# Patient Record
Sex: FEMALE | Race: WHITE | Employment: UNEMPLOYED | URBAN - METROPOLITAN AREA
[De-identification: names, ages, dates, MRNs, and addresses within clinical notes are randomized per-mention and may not be internally consistent; named-entity substitution may affect disease eponyms.]

---

## 2017-01-10 ENCOUNTER — HOSPITAL ENCOUNTER (EMERGENCY)
Age: 32
Discharge: HOME | End: 2017-01-10
Attending: EMERGENCY MEDICINE
Payer: MEDICAID

## 2017-01-10 VITALS
HEART RATE: 85 BPM | SYSTOLIC BLOOD PRESSURE: 139 MMHG | BODY MASS INDEX: 43.32 KG/M2 | RESPIRATION RATE: 16 BRPM | TEMPERATURE: 98.1 F | OXYGEN SATURATION: 95 % | WEIGHT: 260 LBS | HEIGHT: 65 IN | DIASTOLIC BLOOD PRESSURE: 80 MMHG

## 2017-01-10 DIAGNOSIS — G43.809 OTHER MIGRAINE WITHOUT STATUS MIGRAINOSUS, NOT INTRACTABLE: Primary | ICD-10-CM

## 2017-01-10 LAB — HCG UR QL: NEGATIVE

## 2017-01-10 PROCEDURE — 81025 URINE PREGNANCY TEST: CPT

## 2017-01-10 PROCEDURE — 96361 HYDRATE IV INFUSION ADD-ON: CPT

## 2017-01-10 PROCEDURE — 74011000258 HC RX REV CODE- 258: Performed by: PHYSICIAN ASSISTANT

## 2017-01-10 PROCEDURE — 99283 EMERGENCY DEPT VISIT LOW MDM: CPT

## 2017-01-10 PROCEDURE — 74011250636 HC RX REV CODE- 250/636: Performed by: PHYSICIAN ASSISTANT

## 2017-01-10 PROCEDURE — 96375 TX/PRO/DX INJ NEW DRUG ADDON: CPT

## 2017-01-10 PROCEDURE — 96374 THER/PROPH/DIAG INJ IV PUSH: CPT

## 2017-01-10 RX ORDER — LORAZEPAM 2 MG/ML
1 INJECTION INTRAMUSCULAR
Status: COMPLETED | OUTPATIENT
Start: 2017-01-10 | End: 2017-01-10

## 2017-01-10 RX ORDER — DEXAMETHASONE SODIUM PHOSPHATE 10 MG/ML
10 INJECTION INTRAMUSCULAR; INTRAVENOUS
Status: COMPLETED | OUTPATIENT
Start: 2017-01-10 | End: 2017-01-10

## 2017-01-10 RX ORDER — DIPHENHYDRAMINE HYDROCHLORIDE 50 MG/ML
50 INJECTION, SOLUTION INTRAMUSCULAR; INTRAVENOUS
Status: COMPLETED | OUTPATIENT
Start: 2017-01-10 | End: 2017-01-10

## 2017-01-10 RX ORDER — KETOROLAC TROMETHAMINE 30 MG/ML
30 INJECTION, SOLUTION INTRAMUSCULAR; INTRAVENOUS
Status: COMPLETED | OUTPATIENT
Start: 2017-01-10 | End: 2017-01-10

## 2017-01-10 RX ADMIN — LORAZEPAM 1 MG: 2 INJECTION INTRAMUSCULAR; INTRAVENOUS at 19:43

## 2017-01-10 RX ADMIN — SODIUM CHLORIDE 1000 ML: 900 INJECTION, SOLUTION INTRAVENOUS at 19:41

## 2017-01-10 RX ADMIN — PROMETHAZINE HYDROCHLORIDE 25 MG: 25 INJECTION INTRAMUSCULAR; INTRAVENOUS at 20:14

## 2017-01-10 RX ADMIN — KETOROLAC TROMETHAMINE 30 MG: 30 INJECTION, SOLUTION INTRAMUSCULAR at 19:43

## 2017-01-10 RX ADMIN — DIPHENHYDRAMINE HYDROCHLORIDE 50 MG: 50 INJECTION, SOLUTION INTRAMUSCULAR; INTRAVENOUS at 19:43

## 2017-01-10 RX ADMIN — DEXAMETHASONE SODIUM PHOSPHATE 10 MG: 10 INJECTION, SOLUTION INTRAMUSCULAR; INTRAVENOUS at 19:43

## 2017-01-10 NOTE — ED TRIAGE NOTES
Pt reports headache and vomiting starting this morning. Reports hx of migraines. Denies hitting her head. Reports taking propanolol and tylenol with no relief of headache.

## 2017-01-11 NOTE — DISCHARGE INSTRUCTIONS
Migraine Headache: Care Instructions  Your Care Instructions  Migraines are painful, throbbing headaches that often start on one side of the head. They may cause nausea and vomiting and make you sensitive to light, sound, or smell. Without treatment, migraines can last from 4 hours to a few days. Medicines can help prevent migraines or stop them after they have started. Your doctor can help you find which ones work best for you. Follow-up care is a key part of your treatment and safety. Be sure to make and go to all appointments, and call your doctor if you are having problems. It's also a good idea to know your test results and keep a list of the medicines you take. How can you care for yourself at home? · Do not drive if you have taken a prescription pain medicine. · Rest in a quiet, dark room until your headache is gone. Close your eyes, and try to relax or go to sleep. Don't watch TV or read. · Put a cold, moist cloth or cold pack on the painful area for 10 to 20 minutes at a time. Put a thin cloth between the cold pack and your skin. · Use a warm, moist towel or a heating pad set on low to relax tight shoulder and neck muscles. · Have someone gently massage your neck and shoulders. · Take your medicines exactly as prescribed. Call your doctor if you think you are having a problem with your medicine. You will get more details on the specific medicines your doctor prescribes. · Be careful not to take pain medicine more often than the instructions allow. You could get worse or more frequent headaches when the medicine wears off. To prevent migraines  · Keep a headache diary so you can figure out what triggers your headaches. Avoiding triggers may help you prevent headaches. Record when each headache began, how long it lasted, and what the pain was like.  (Was it throbbing, aching, stabbing, or dull?) Write down any other symptoms you had with the headache, such as nausea, flashing lights or dark spots, or sensitivity to bright light or loud noise. Note if the headache occurred near your period. List anything that might have triggered the headache. Triggers may include certain foods (chocolate, cheese, wine) or odors, smoke, bright light, stress, or lack of sleep. · If your doctor has prescribed medicine for your migraines, take it as directed. You may have medicine that you take only when you get a migraine and medicine that you take all the time to help prevent migraines. ¨ If your doctor has prescribed medicine for when you get a headache, take it at the first sign of a migraine, unless your doctor has given you other instructions. ¨ If your doctor has prescribed medicine to prevent migraines, take it exactly as prescribed. Call your doctor if you think you are having a problem with your medicine. · Find healthy ways to deal with stress. Migraines are most common during or right after stressful times. Take time to relax before and after you do something that has caused a migraine in the past.  · Try to keep your muscles relaxed by keeping good posture. Check your jaw, face, neck, and shoulder muscles for tension. Try to relax them. When you sit at a desk, change positions often. And make sure to stretch for 30 seconds each hour. · Get plenty of sleep and exercise. · Eat meals on a regular schedule. Avoid foods and drinks that often trigger migraines. These include chocolate, alcohol (especially red wine and port), aspartame, monosodium glutamate (MSG), and some additives found in foods (such as hot dogs, nichols, cold cuts, aged cheeses, and pickled foods). · Limit caffeine. Don't drink too much coffee, tea, or soda. But don't quit caffeine suddenly. That can also give you migraines. · Do not smoke or allow others to smoke around you. If you need help quitting, talk to your doctor about stop-smoking programs and medicines. These can increase your chances of quitting for good.   · If you are taking birth control pills or hormone therapy, talk to your doctor about whether they are triggering your migraines. When should you call for help? Call 911 anytime you think you may need emergency care. For example, call if:  · You have signs of a stroke. These may include:  ¨ Sudden numbness, paralysis, or weakness in your face, arm, or leg, especially on only one side of your body. ¨ Sudden vision changes. ¨ Sudden trouble speaking. ¨ Sudden confusion or trouble understanding simple statements. ¨ Sudden problems with walking or balance. ¨ A sudden, severe headache that is different from past headaches. Call your doctor now or seek immediate medical care if:  · You have new or worse nausea and vomiting. · You have a new or higher fever. · Your headache gets much worse. Watch closely for changes in your health, and be sure to contact your doctor if:  · You are not getting better after 2 days (48 hours). Where can you learn more? Go to http://jose-jacek.info/. Enter V938 in the search box to learn more about \"Migraine Headache: Care Instructions. \"  Current as of: February 19, 2016  Content Version: 11.1  © 1617-5690 [employer]. Care instructions adapted under license by [employer] (which disclaims liability or warranty for this information). If you have questions about a medical condition or this instruction, always ask your healthcare professional. [address] any warranty or liability for your use of this information. We hope that we have addressed all of your medical concerns. The examination and treatment you received in the Emergency Department were for an emergent problem and were not intended as complete care. It is important that you follow up with your healthcare provider(s) for ongoing care.  If your symptoms worsen or do not improve as expected, and you are unable to reach your usual health care provider(s), you should return to the Emergency Department. Today's healthcare is undergoing tremendous change, and patient satisfaction surveys are one of the many tools to assess the quality of medical care. You may receive a survey from the [facility] regarding your experience in the Emergency Department. I hope that your experience has been completely positive, particularly the medical care that I provided. As such, please participate in the survey; anything less than excellent does not meet my expectations or intentions. [facility] and [facility] participate in nationally recognized quality of care measures. If your blood pressure is greater than 120/80, as reported below, we urge that you seek medical care to address the potential of high blood pressure, commonly known as hypertension. Hypertension can be hereditary or can be caused by certain medical conditions, pain, stress, or \"white coat syndrome. \"       Please make an appointment with your health care provider(s) for follow up of your Emergency Department visit. VITALS:   Patient Vitals for the past 8 hrs:   Temp Pulse Resp BP SpO2   01/10/17 2123 - 85 16 139/80 -   01/10/17 1845 98.1 °F (36.7 °C) 80 16 (!) 155/99 95 %          Thank you for allowing us to provide you with medical care today. We realize that you have many choices for your emergency care needs. Please choose us in the future for any continued health care needs. Charisma Golden, [address] [place] [address].   Office: [phone]            Recent Results (from the past 24 hour(s))   HCG URINE, QL. - POC    Collection Time: 01/10/17  7:25 PM   Result Value Ref Range    Pregnancy test,urine (POC) NEGATIVE  NEG         No results found.

## 2017-01-11 NOTE — ED NOTES
TAVIA Patton has reviewed discharge instructions with patient and spouse including prescription(s) if applicable. Patient has received and verbalizes understanding of all instructions and has no further questions at this time. Patient exits ED via wheelchair accompanied by spouse and RN. Patient in no acute distress.

## 2017-01-11 NOTE — ED NOTES
Upon RN entering patient room to start IV and give medications, patient is on the phone and remains on the phone during IV start and initiation of medications.

## 2017-01-11 NOTE — ED PROVIDER NOTES
HPI Comments: Catalina present with complaints of headache and vomiting that started this morning. Patient reports history of migraines. Patient has been taking propanolol and tylenol with no relief. Patient denies any fever, chills or recent head injury. Patient reports this headache is similar to previous migraines. Patient is a 33 y.o. female presenting with headaches. The history is provided by the patient. Headache    This is a new problem. The current episode started 12 to 24 hours ago. The problem occurs constantly. The problem has not changed since onset. The headache is aggravated by activity. The pain is located in the frontal region. The quality of the pain is described as sharp. The pain is severe. Associated symptoms include nausea. She has tried NSAIDs and acetaminophen for the symptoms. The treatment provided no relief. Past Medical History:   Diagnosis Date    Bilateral ovarian cysts     GERD (gastroesophageal reflux disease)     Lupus (HCC)     Migraine     Neurological disorder      migraines     Other ill-defined conditions(799.89)      lupus       Past Surgical History:   Procedure Laterality Date    Hx other surgical       hemmorhoid, hernia-groin    Hx gi       LAPAROSCOPY    Hx hernia repair       BILATERAL INGUINAL HERNIA REPAIR    Hx heent  4/4/16     sinus surgery         Family History:   Problem Relation Age of Onset    Anesth Problems Neg Hx        Social History     Social History    Marital status: SINGLE     Spouse name: N/A    Number of children: N/A    Years of education: N/A     Occupational History    Not on file. Social History Main Topics    Smoking status: Former Smoker     Years: 1.00     Quit date: 3/31/2014    Smokeless tobacco: Not on file    Alcohol use No    Drug use: No    Sexual activity: Not on file     Other Topics Concern    Not on file     Social History Narrative         ALLERGIES: Reglan [metoclopramide];  Percocet [oxycodone-acetaminophen]; Phenergan [promethazine]; Zofran [ondansetron hcl (pf)]; and Compazine [prochlorperazine edisylate]    Review of Systems   Constitutional: Negative. Eyes: Negative. Respiratory: Negative. Cardiovascular: Negative. Gastrointestinal: Positive for nausea. Endocrine: Negative. Genitourinary: Negative. Musculoskeletal: Negative. Allergic/Immunologic: Negative. Neurological: Positive for headaches. Hematological: Negative. Psychiatric/Behavioral: Negative. All other systems reviewed and are negative. Vitals:    01/10/17 1845   BP: (!) 155/99   Pulse: 80   Resp: 16   Temp: 98.1 °F (36.7 °C)   SpO2: 95%   Weight: 117.9 kg (260 lb)   Height: 5' 5\" (1.651 m)            Physical Exam   Constitutional: She is oriented to person, place, and time. She appears well-developed and well-nourished. HENT:   Head: Normocephalic and atraumatic. Right Ear: External ear normal.   Left Ear: External ear normal.   Mouth/Throat: Oropharynx is clear and moist. No oropharyngeal exudate. Eyes: Conjunctivae and EOM are normal. Pupils are equal, round, and reactive to light. Right eye exhibits no discharge. Left eye exhibits no discharge. No scleral icterus. Right eye exhibits normal extraocular motion and no nystagmus. Left eye exhibits normal extraocular motion and no nystagmus. Right pupil is round and reactive. Left pupil is round and reactive. Pupils are equal.   Neck: Normal range of motion. No tracheal deviation present. No thyromegaly present. Cardiovascular: Normal rate, regular rhythm and normal heart sounds. No murmur heard. Pulmonary/Chest: Effort normal and breath sounds normal. No respiratory distress. She has no wheezes. She has no rales. She exhibits no tenderness. Abdominal: Soft. Bowel sounds are normal. She exhibits no distension. There is no tenderness. There is no rebound and no guarding. Musculoskeletal: Normal range of motion.  She exhibits no edema or tenderness. Lymphadenopathy:     She has no cervical adenopathy. Neurological: She is alert and oriented to person, place, and time. She has normal strength. No cranial nerve deficit or sensory deficit. Coordination normal. GCS eye subscore is 4. GCS verbal subscore is 5. GCS motor subscore is 6. Skin: Skin is warm. No erythema. Psychiatric: She has a normal mood and affect. Her behavior is normal. Judgment and thought content normal.   Nursing note and vitals reviewed. MDM  Number of Diagnoses or Management Options  Other migraine without status migrainosus, not intractable:   Diagnosis management comments: Assesment/Plan- Patient with history of migraines. Symptoms improved after medications in ED. Patient encouraged to follow up with PCP and patient is educated on reasons to return to the ED.          Amount and/or Complexity of Data Reviewed  Discuss the patient with other providers: yes (Attending- Dr. Bartolo Cornell)      ED Course       Procedures

## 2017-02-16 ENCOUNTER — HOSPITAL ENCOUNTER (EMERGENCY)
Age: 32
Discharge: HOME | End: 2017-02-16
Attending: EMERGENCY MEDICINE | Admitting: EMERGENCY MEDICINE
Payer: MEDICAID

## 2017-02-16 VITALS
HEIGHT: 65 IN | WEIGHT: 260 LBS | RESPIRATION RATE: 18 BRPM | TEMPERATURE: 98.5 F | HEART RATE: 94 BPM | OXYGEN SATURATION: 97 % | DIASTOLIC BLOOD PRESSURE: 86 MMHG | SYSTOLIC BLOOD PRESSURE: 141 MMHG | BODY MASS INDEX: 43.32 KG/M2

## 2017-02-16 DIAGNOSIS — R51.9 HEADACHE, UNSPECIFIED HEADACHE TYPE: Primary | ICD-10-CM

## 2017-02-16 DIAGNOSIS — Z86.69 HISTORY OF MIGRAINE: ICD-10-CM

## 2017-02-16 LAB
APPEARANCE UR: ABNORMAL
BACTERIA URNS QL MICRO: NEGATIVE /HPF
BILIRUB UR QL: NEGATIVE
COLOR UR: ABNORMAL
EPITH CASTS URNS QL MICRO: ABNORMAL /LPF
GLUCOSE UR STRIP.AUTO-MCNC: NEGATIVE MG/DL
HCG UR QL: NEGATIVE
HGB UR QL STRIP: ABNORMAL
HYALINE CASTS URNS QL MICRO: ABNORMAL /LPF (ref 0–5)
KETONES UR QL STRIP.AUTO: NEGATIVE MG/DL
LEUKOCYTE ESTERASE UR QL STRIP.AUTO: ABNORMAL
NITRITE UR QL STRIP.AUTO: NEGATIVE
PH UR STRIP: 6 [PH] (ref 5–8)
PROT UR STRIP-MCNC: NEGATIVE MG/DL
RBC #/AREA URNS HPF: ABNORMAL /HPF (ref 0–5)
SP GR UR REFRACTOMETRY: 1.02 (ref 1–1.03)
UROBILINOGEN UR QL STRIP.AUTO: 0.2 EU/DL (ref 0.2–1)
WBC URNS QL MICRO: ABNORMAL /HPF (ref 0–4)

## 2017-02-16 PROCEDURE — 81001 URINALYSIS AUTO W/SCOPE: CPT | Performed by: PHYSICIAN ASSISTANT

## 2017-02-16 PROCEDURE — 96365 THER/PROPH/DIAG IV INF INIT: CPT

## 2017-02-16 PROCEDURE — 99284 EMERGENCY DEPT VISIT MOD MDM: CPT

## 2017-02-16 PROCEDURE — 96375 TX/PRO/DX INJ NEW DRUG ADDON: CPT

## 2017-02-16 PROCEDURE — 74011250636 HC RX REV CODE- 250/636: Performed by: PHYSICIAN ASSISTANT

## 2017-02-16 PROCEDURE — 81025 URINE PREGNANCY TEST: CPT

## 2017-02-16 PROCEDURE — 96376 TX/PRO/DX INJ SAME DRUG ADON: CPT

## 2017-02-16 PROCEDURE — 74011000258 HC RX REV CODE- 258: Performed by: PHYSICIAN ASSISTANT

## 2017-02-16 RX ORDER — KETOROLAC TROMETHAMINE 30 MG/ML
15 INJECTION, SOLUTION INTRAMUSCULAR; INTRAVENOUS
Status: COMPLETED | OUTPATIENT
Start: 2017-02-16 | End: 2017-02-16

## 2017-02-16 RX ORDER — DIPHENHYDRAMINE HYDROCHLORIDE 50 MG/ML
25 INJECTION, SOLUTION INTRAMUSCULAR; INTRAVENOUS
Status: COMPLETED | OUTPATIENT
Start: 2017-02-16 | End: 2017-02-16

## 2017-02-16 RX ORDER — PREDNISONE 10 MG/1
TABLET ORAL
Qty: 21 TAB | Refills: 0 | Status: SHIPPED | OUTPATIENT
Start: 2017-02-16 | End: 2017-03-13

## 2017-02-16 RX ORDER — PROMETHAZINE HYDROCHLORIDE 25 MG/1
25 SUPPOSITORY RECTAL
Qty: 12 SUPPOSITORY | Refills: 0 | Status: ON HOLD | OUTPATIENT
Start: 2017-02-16 | End: 2017-03-14

## 2017-02-16 RX ORDER — CETIRIZINE HCL 10 MG
10 TABLET ORAL DAILY
Qty: 20 TAB | Refills: 0 | Status: SHIPPED | OUTPATIENT
Start: 2017-02-16

## 2017-02-16 RX ORDER — MAGNESIUM SULFATE HEPTAHYDRATE 40 MG/ML
2 INJECTION, SOLUTION INTRAVENOUS
Status: COMPLETED | OUTPATIENT
Start: 2017-02-16 | End: 2017-02-16

## 2017-02-16 RX ADMIN — DIPHENHYDRAMINE HYDROCHLORIDE 25 MG: 50 INJECTION, SOLUTION INTRAMUSCULAR; INTRAVENOUS at 11:50

## 2017-02-16 RX ADMIN — KETOROLAC TROMETHAMINE 15 MG: 30 INJECTION, SOLUTION INTRAMUSCULAR at 11:50

## 2017-02-16 RX ADMIN — SODIUM CHLORIDE 1000 ML: 900 INJECTION, SOLUTION INTRAVENOUS at 11:52

## 2017-02-16 RX ADMIN — PROMETHAZINE HYDROCHLORIDE 25 MG: 25 INJECTION INTRAMUSCULAR; INTRAVENOUS at 11:52

## 2017-02-16 RX ADMIN — DIPHENHYDRAMINE HYDROCHLORIDE 25 MG: 50 INJECTION, SOLUTION INTRAMUSCULAR; INTRAVENOUS at 13:01

## 2017-02-16 RX ADMIN — MAGNESIUM SULFATE HEPTAHYDRATE 2 G: 40 INJECTION, SOLUTION INTRAVENOUS at 12:16

## 2017-02-16 NOTE — DISCHARGE INSTRUCTIONS
Headache: Care Instructions  Your Care Instructions    Headaches have many possible causes. Most headaches aren't a sign of a more serious problem, and they will get better on their own. Home treatment may help you feel better faster. The doctor has checked you carefully, but problems can develop later. If you notice any problems or new symptoms, get medical treatment right away. Follow-up care is a key part of your treatment and safety. Be sure to make and go to all appointments, and call your doctor if you are having problems. It's also a good idea to know your test results and keep a list of the medicines you take. How can you care for yourself at home? · Do not drive if you have taken a prescription pain medicine. · Rest in a quiet, dark room until your headache is gone. Close your eyes and try to relax or go to sleep. Don't watch TV or read. · Put a cold, moist cloth or cold pack on the painful area for 10 to 20 minutes at a time. Put a thin cloth between the cold pack and your skin. · Use a warm, moist towel or a heating pad set on low to relax tight shoulder and neck muscles. · Have someone gently massage your neck and shoulders. · Take pain medicines exactly as directed. ¨ If the doctor gave you a prescription medicine for pain, take it as prescribed. ¨ If you are not taking a prescription pain medicine, ask your doctor if you can take an over-the-counter medicine. · Be careful not to take pain medicine more often than the instructions allow, because you may get worse or more frequent headaches when the medicine wears off. · Do not ignore new symptoms that occur with a headache, such as a fever, weakness or numbness, vision changes, or confusion. These may be signs of a more serious problem. To prevent headaches  · Keep a headache diary so you can figure out what triggers your headaches. Avoiding triggers may help you prevent headaches.  Record when each headache began, how long it lasted, and what the pain was like (throbbing, aching, stabbing, or dull). Write down any other symptoms you had with the headache, such as nausea, flashing lights or dark spots, or sensitivity to bright light or loud noise. Note if the headache occurred near your period. List anything that might have triggered the headache, such as certain foods (chocolate, cheese, wine) or odors, smoke, bright light, stress, or lack of sleep. · Find healthy ways to deal with stress. Headaches are most common during or right after stressful times. Take time to relax before and after you do something that has caused a headache in the past.  · Try to keep your muscles relaxed by keeping good posture. Check your jaw, face, neck, and shoulder muscles for tension, and try relaxing them. When sitting at a desk, change positions often, and stretch for 30 seconds each hour. · Get plenty of sleep and exercise. · Eat regularly and well. Long periods without food can trigger a headache. · Treat yourself to a massage. Some people find that regular massages are very helpful in relieving tension. · Limit caffeine by not drinking too much coffee, tea, or soda. But don't quit caffeine suddenly, because that can also give you headaches. · Reduce eyestrain from computers by blinking frequently and looking away from the computer screen every so often. Make sure you have proper eyewear and that your monitor is set up properly, about an arm's length away. · Seek help if you have depression or anxiety. Your headaches may be linked to these conditions. Treatment can both prevent headaches and help with symptoms of anxiety or depression. When should you call for help? Call 911 anytime you think you may need emergency care. For example, call if:  · You have signs of a stroke. These may include:  ¨ Sudden numbness, paralysis, or weakness in your face, arm, or leg, especially on only one side of your body. ¨ Sudden vision changes.   ¨ Sudden trouble speaking. ¨ Sudden confusion or trouble understanding simple statements. ¨ Sudden problems with walking or balance. ¨ A sudden, severe headache that is different from past headaches. Call your doctor now or seek immediate medical care if:  · You have a new or worse headache. · Your headache gets much worse. Where can you learn more? Go to http://jose-jacek.info/. Enter M271 in the search box to learn more about \"Headache: Care Instructions. \"  Current as of: February 19, 2016  Content Version: 11.1  © 4433-8999 [employer]. Care instructions adapted under license by [employer] (which disclaims liability or warranty for this information). If you have questions about a medical condition or this instruction, always ask your healthcare professional. [address] any warranty or liability for your use of this information. Allergies: Care Instructions  Your Care Instructions  Allergies occur when your body's defense system (immune system) overreacts to certain substances. The immune system treats a harmless substance as if it were a harmful germ or virus. Many things can cause this overreaction, including pollens, medicine, food, dust, animal dander, and mold. Allergies can be mild or severe. Mild allergies can be managed with home treatment. But medicine may be needed to prevent problems. Managing your allergies is an important part of staying healthy. Your doctor may suggest that you have allergy testing to help find out what is causing your allergies. When you know what things trigger your symptoms, you can avoid them. This can prevent allergy symptoms and other health problems. For severe allergies that cause reactions that affect your whole body (anaphylactic reactions), your doctor may prescribe a shot of epinephrine to carry with you in case you have a severe reaction.  Learn how to give yourself the shot and keep it with you at all times. Make sure it is not . Follow-up care is a key part of your treatment and safety. Be sure to make and go to all appointments, and call your doctor if you are having problems. It's also a good idea to know your test results and keep a list of the medicines you take. How can you care for yourself at home? · If you have been told by your doctor that dust or dust mites are causing your allergy, decrease the dust around your bed:  [facility] sheets, pillowcases, and other bedding in hot water every week. ¨ Use dust-proof covers for pillows, duvets, and mattresses. Avoid plastic covers because they tear easily and do not \"breathe. \" Wash as instructed on the label. ¨ Do not use any blankets and pillows that you do not need. ¨ Use blankets that you can wash in your washing machine. ¨ Consider removing drapes and carpets, which attract and hold dust, from your bedroom. · If you are allergic to house dust and mites, do not use home humidifiers. Your doctor can suggest ways you can control dust and mites. · Look for signs of cockroaches. Cockroaches cause allergic reactions. Use cockroach baits to get rid of them. Then, clean your home well. Cockroaches like areas where grocery bags, newspapers, empty bottles, or cardboard boxes are stored. Do not keep these inside your home, and keep trash and food containers sealed. Seal off any spots where cockroaches might enter your home. · If you are allergic to mold, get rid of furniture, rugs, and drapes that smell musty. Check for mold in the bathroom. · If you are allergic to outdoor pollen or mold spores, use air-conditioning. Change or clean all filters every month. Keep windows closed. · If you are allergic to pollen, stay inside when pollen counts are high. Use a vacuum  with a HEPA filter or a double-thickness filter at least two times each week. · Stay inside when air pollution is bad. Avoid paint fumes, perfumes, and other strong odors.   · Avoid conditions that make your allergies worse. Stay away from smoke. Do not smoke or let anyone else smoke in your house. Do not use fireplaces or wood-burning stoves. · If you are allergic to your pets, change the air filter in your furnace every month. Use high-efficiency filters. · If you are allergic to pet dander, keep pets outside or out of your bedroom. Old carpet and cloth furniture can hold a lot of animal dander. You may need to replace them. When should you call for help? Give an epinephrine shot if:  · You think you are having a severe allergic reaction. · You have symptoms in more than one body area, such as mild nausea and an itchy mouth. After giving an epinephrine shot call 911, even if you feel better. Call 911 if:  · You have symptoms of a severe allergic reaction. These may include:  ¨ Sudden raised, red areas (hives) all over your body. ¨ Swelling of the throat, mouth, lips, or tongue. ¨ Trouble breathing. ¨ Passing out (losing consciousness). Or you may feel very lightheaded or suddenly feel weak, confused, or restless. · You have been given an epinephrine shot, even if you feel better. Call your doctor now or seek immediate medical care if:  · You have symptoms of an allergic reaction, such as:  ¨ A rash or hives (raised, red areas on the skin). ¨ Itching. ¨ Swelling. ¨ Belly pain, nausea, or vomiting. Watch closely for changes in your health, and be sure to contact your doctor if:  · You do not get better as expected. Where can you learn more? Go to http://jose-jacek.info/. Enter H133 in the search box to learn more about \"Allergies: Care Instructions. \"  Current as of: February 12, 2016  Content Version: 11.1  © 3409-7619 [employer]. Care instructions adapted under license by [employer] (which disclaims liability or warranty for this information).  If you have questions about a medical condition or this instruction, always ask your healthcare professional. [place] [address] any warranty or liability for your use of this information. We hope that we have addressed all of your medical concerns. The examination and treatment you received in the Emergency Department were for an emergent problem and were not intended as complete care. It is important that you follow up with your healthcare provider(s) for ongoing care. If your symptoms worsen or do not improve as expected, and you are unable to reach your usual health care provider(s), you should return to the Emergency Department. Today's healthcare is undergoing tremendous change, and patient satisfaction surveys are one of the many tools to assess the quality of medical care. You may receive a survey from the [employer] regarding your experience in the Emergency Department. I hope that your experience has been completely positive, particularly the medical care that I provided. As such, please participate in the survey; anything less than excellent does not meet my expectations or intentions. [facility] and [address] [place] [address] participate in nationally recognized quality of care measures. If your blood pressure is greater than 120/80, as reported below, we urge that you seek medical care to address the potential of high blood pressure, commonly known as hypertension. Hypertension can be hereditary or can be caused by certain medical conditions, pain, stress, or \"white coat syndrome. \"       Please make an appointment with your health care provider(s) for follow up of your Emergency Department visit. VITALS:   Patient Vitals for the past 8 hrs:   Temp Pulse Resp BP SpO2   02/16/17 1230 - - - 131/72 98 %   02/16/17 1215 - - - 140/86 100 %   02/16/17 1105 98.5 °F (36.9 °C) 94 18 (!) 156/108 98 %          Thank you for allowing us to provide you with medical care today.   We realize that you have many choices for your emergency care needs. Please choose us in the future for any continued health care needs. Miguel Claudio, [address] [place], [place] [address]y [address].   Office: [phone]            Recent Results (from the past 24 hour(s))   HCG URINE, QL. - POC    Collection Time: 02/16/17 11:36 AM   Result Value Ref Range    Pregnancy test,urine (POC) NEGATIVE  NEG     URINALYSIS W/MICROSCOPIC    Collection Time: 02/16/17 12:18 PM   Result Value Ref Range    Color YELLOW/STRAW      Appearance CLOUDY (A) CLEAR      Specific gravity 1.019 1.003 - 1.030      pH (UA) 6.0 5.0 - 8.0      Protein NEGATIVE  NEG mg/dL    Glucose NEGATIVE  NEG mg/dL    Ketone NEGATIVE  NEG mg/dL    Bilirubin NEGATIVE  NEG      Blood LARGE (A) NEG      Urobilinogen 0.2 0.2 - 1.0 EU/dL    Nitrites NEGATIVE  NEG      Leukocyte Esterase TRACE (A) NEG      WBC 5-10 0 - 4 /hpf    RBC 20-50 0 - 5 /hpf    Epithelial cells MODERATE (A) FEW /lpf    Bacteria NEGATIVE  NEG /hpf    Hyaline cast 0-2 0 - 5 /lpf

## 2017-02-16 NOTE — ED TRIAGE NOTES
Reports migraine x4 days. Pt currently vomiting. Denies head injury. Reports migraine feels like her typical migraine. Denies blurred vision.

## 2017-02-16 NOTE — ED PROVIDER NOTES
HPI Comments: My Nieto is a 33 y.o. female who presents ambulatory with mother and son to the ED with a c/o migraine headache similar to other headaches x 4 days. Pt notes she has headaches every day and uses motrin, percocet and sumatriptan as an abortive and propanolol and botox as a preventative. She states she has vomited x 3 pta, which is not uncommon for pt. She notes she is seen in this ER regularly for similar sx. She denies visual changes or focal weakness. She notes she took percocet and sumatriptan yesterday. She took advil today without relief of her sx. She notes she is not usually pain free. She specifically denies any fevers, chills,  chest pain, shortness of breath, rash, diarrhea, sweating or weight loss. Pt notes she thinks her allergies are a component for her migraines but she does not take medication daily. She is requesting an rx for allergy medication. Pt states she can take phenergan, but she gets a dose of benadryl with it normally.          PCP: Bud Servin MD  PMHx significant for: Past Medical History:    Bilateral ovarian cysts                                       GERD (gastroesophageal reflux disease)                        Lupus ([facility] [address]                                                   Migraine                                                      Neurological disorder                                           Comment:migraines     Other ill-defined conditions(799.89)                            Comment:lupus  PSHx significant for: Past Surgical History:    HX OTHER SURGICAL                                                Comment:hemmorhoid, hernia-groin    HX GI                                                            Comment:LAPAROSCOPY    HX HERNIA REPAIR                                                 Comment:BILATERAL INGUINAL HERNIA REPAIR    HX HEENT                                         4/4/16          Comment:sinus surgery  Social Hx: Tobacco: denies current EtOH: denies Illicit drug use: denies    There are no further complaints or symptoms at this time. The history is provided by the patient. Past Medical History:   Diagnosis Date    Bilateral ovarian cysts     GERD (gastroesophageal reflux disease)     Lupus (HCC)     Migraine     Neurological disorder      migraines     Other ill-defined conditions(799.89)      lupus       Past Surgical History:   Procedure Laterality Date    Hx other surgical       hemmorhoid, hernia-groin    Hx gi       LAPAROSCOPY    Hx hernia repair       BILATERAL INGUINAL HERNIA REPAIR    Hx heent  4/4/16     sinus surgery         Family History:   Problem Relation Age of Onset    Anesth Problems Neg Hx        Social History     Social History    Marital status: SINGLE     Spouse name: N/A    Number of children: N/A    Years of education: N/A     Occupational History    Not on file. Social History Main Topics    Smoking status: Former Smoker     Years: 1.00     Quit date: 3/31/2014    Smokeless tobacco: Not on file    Alcohol use No    Drug use: No    Sexual activity: Not on file     Other Topics Concern    Not on file     Social History Narrative         ALLERGIES: Reglan [metoclopramide]; Percocet [oxycodone-acetaminophen]; Phenergan [promethazine]; Zofran [ondansetron hcl (pf)]; and Compazine [prochlorperazine edisylate]    Review of Systems   Constitutional: Negative for chills and fever. HENT: Positive for congestion. Negative for rhinorrhea, sneezing and sore throat. Eyes: Negative for redness and visual disturbance. Respiratory: Negative for shortness of breath. Cardiovascular: Negative for chest pain and leg swelling. Gastrointestinal: Positive for nausea and vomiting. Negative for abdominal pain, constipation and diarrhea. Genitourinary: Negative for difficulty urinating and frequency. Musculoskeletal: Negative for back pain, myalgias and neck stiffness. Skin: Negative for rash. Neurological: Positive for headaches. Negative for dizziness, syncope and weakness. Hematological: Negative for adenopathy. Patient Vitals for the past 12 hrs:   Temp Pulse Resp BP SpO2   02/16/17 1300 - - - 141/86 97 %   02/16/17 1230 - - - 131/72 98 %   02/16/17 1215 - - - 140/86 100 %   02/16/17 1105 98.5 °F (36.9 °C) 94 18 (!) 156/108 98 %              Physical Exam   Constitutional: She is oriented to person, place, and time. She appears well-developed and well-nourished. No distress. Above average bmi female   HENT:   Head: Normocephalic and atraumatic. Right Ear: External ear normal.   Left Ear: External ear normal.   Mouth/Throat: Oropharynx is clear and moist. No oropharyngeal exudate. Pale boggy nares +PND   Neck: Neck supple. No nuchal rigidity or meningismus   Cardiovascular: Normal rate, regular rhythm, normal heart sounds and intact distal pulses. Exam reveals no gallop and no friction rub. No murmur heard. Pulmonary/Chest: Effort normal and breath sounds normal. No stridor. No respiratory distress. She has no wheezes. She has no rales. She exhibits no tenderness. Abdominal: Soft. Bowel sounds are normal. She exhibits no distension and no mass. There is no tenderness. There is no rebound and no guarding. Musculoskeletal: Normal range of motion. She exhibits no edema, tenderness or deformity. Neurological: She is alert and oriented to person, place, and time. No cranial nerve deficit. Coordination normal.   Skin: No rash noted. No erythema. No pallor. Psychiatric: She has a normal mood and affect. Her behavior is normal.   Nursing note and vitals reviewed.        MDM  Number of Diagnoses or Management Options  Headache, unspecified headache type:   History of migraine:      Amount and/or Complexity of Data Reviewed  Clinical lab tests: ordered and reviewed  Tests in the medicine section of CPT®: reviewed and ordered  Obtain history from someone other than the patient: yes (Mother and son)  Review and summarize past medical records: yes  Independent visualization of images, tracings, or specimens: yes    Patient Progress  Patient progress: stable    ED Course       Procedures  11:20 AM  Discussed with patient at length the need for blood pressure re-evaluation and management with primary care. Discussed the long term sequelae for elevated blood pressure including blindness, CVA, MI, and renal failure/ dialysis. Pt agrees to follow up as directed. Dilcia Cast PA-C       12:42 PM  Pt awakened from sleep. Headache improved. Will discharge home  Lanie Claudio PA-C      12:55 PM  Pt vomited moderate amount. Will give additional benadryl, finish fluids and discharge again. Lanie Claudio PA-C      LABORATORY TESTS:  Recent Results (from the past 12 hour(s))   HCG URINE, QL. - POC    Collection Time: 02/16/17 11:36 AM   Result Value Ref Range    Pregnancy test,urine (POC) NEGATIVE  NEG     URINALYSIS W/MICROSCOPIC    Collection Time: 02/16/17 12:18 PM   Result Value Ref Range    Color YELLOW/STRAW      Appearance CLOUDY (A) CLEAR      Specific gravity 1.019 1.003 - 1.030      pH (UA) 6.0 5.0 - 8.0      Protein NEGATIVE  NEG mg/dL    Glucose NEGATIVE  NEG mg/dL    Ketone NEGATIVE  NEG mg/dL    Bilirubin NEGATIVE  NEG      Blood LARGE (A) NEG      Urobilinogen 0.2 0.2 - 1.0 EU/dL    Nitrites NEGATIVE  NEG      Leukocyte Esterase TRACE (A) NEG      WBC 5-10 0 - 4 /hpf    RBC 20-50 0 - 5 /hpf    Epithelial cells MODERATE (A) FEW /lpf    Bacteria NEGATIVE  NEG /hpf    Hyaline cast 0-2 0 - 5 /lpf       IMAGING RESULTS:  No orders to display       MEDICATIONS GIVEN:  Medications   promethazine (PHENERGAN) 25 mg in 0.9% sodium chloride 50 mL IVPB (0 mg IntraVENous IV Completed 2/16/17 1207)   diphenhydrAMINE (BENADRYL) injection 25 mg (25 mg IntraVENous Given 2/16/17 1150)   ketorolac (TORADOL) injection 15 mg (15 mg IntraVENous Given 2/16/17 1150)   magnesium sulfate 2 g/50 ml IVPB (premix or compounded) (0 g IntraVENous IV Completed 2/16/17 1316)   sodium chloride 0.9 % bolus infusion 1,000 mL (0 mL IntraVENous IV Completed 2/16/17 1252)   diphenhydrAMINE (BENADRYL) injection 25 mg (25 mg IntraVENous Given 2/16/17 1301)       IMPRESSION:  1. Headache, unspecified headache type    2. History of migraine        PLAN:  1. Discharge Medication List as of 2/16/2017 12:45 PM      START taking these medications    Details   cetirizine (ZYRTEC) 10 mg tablet Take 1 Tab by mouth daily. , Print, Disp-20 Tab, R-0      predniSONE (STERAPRED DS) 10 mg dose pack Take as directed on taper package, Print, Disp-21 Tab, R-0         CONTINUE these medications which have NOT CHANGED    Details   propranolol (INDERAL) 20 mg tablet Take 20 mg by mouth three (3) times daily. , Historical Med      albuterol sulfate 90 mcg/actuation aepb Take 2 Puffs by inhalation four (4) times daily as needed for Cough (SOB, wheezing). , Print, Disp-1 Inhaler, R-0      SUMAtriptan (IMITREX) 25 mg tablet Take 25 mg by mouth once as needed for Migraine., Historical Med           2. Follow-up Information     Follow up With Details Comments Contact Info    Jesse Corrigan MD Schedule an appointment as soon as possible for a visit 2-4 days for recheck [address]  [address]  [facility]  [phone]          Return to ED if worse     12:43 PM  Pt has been reexamined. Pt has no new complaints, changes or physical findings. Care plan outlined and precautions discussed. All available results were reviewed with pt. All medications were reviewed with pt. All of pt's questions and concerns were addressed. Pt agrees to F/U as instructed and agrees to return to ED upon further deterioration. Pt is ready to go home.   Monroe PresidentONEIL

## 2017-03-14 ENCOUNTER — ANESTHESIA EVENT (OUTPATIENT)
Dept: ENDOSCOPY | Age: 32
End: 2017-03-14
Payer: MEDICAID

## 2017-03-14 ENCOUNTER — ANESTHESIA (OUTPATIENT)
Dept: ENDOSCOPY | Age: 32
End: 2017-03-14
Payer: MEDICAID

## 2017-03-14 ENCOUNTER — HOSPITAL ENCOUNTER (OUTPATIENT)
Age: 32
Setting detail: OUTPATIENT SURGERY
Discharge: HOME | End: 2017-03-14
Attending: INTERNAL MEDICINE | Admitting: INTERNAL MEDICINE
Payer: MEDICAID

## 2017-03-14 VITALS
WEIGHT: 258.13 LBS | HEART RATE: 71 BPM | RESPIRATION RATE: 15 BRPM | BODY MASS INDEX: 43.01 KG/M2 | SYSTOLIC BLOOD PRESSURE: 114 MMHG | OXYGEN SATURATION: 99 % | DIASTOLIC BLOOD PRESSURE: 76 MMHG | HEIGHT: 65 IN | TEMPERATURE: 98.7 F

## 2017-03-14 LAB — HCG UR QL: NEGATIVE

## 2017-03-14 PROCEDURE — 76040000019: Performed by: INTERNAL MEDICINE

## 2017-03-14 PROCEDURE — 74011000250 HC RX REV CODE- 250

## 2017-03-14 PROCEDURE — 81025 URINE PREGNANCY TEST: CPT

## 2017-03-14 PROCEDURE — 74011250636 HC RX REV CODE- 250/636: Performed by: INTERNAL MEDICINE

## 2017-03-14 PROCEDURE — 74011250636 HC RX REV CODE- 250/636

## 2017-03-14 PROCEDURE — 76060000031 HC ANESTHESIA FIRST 0.5 HR: Performed by: INTERNAL MEDICINE

## 2017-03-14 RX ORDER — EPINEPHRINE 0.1 MG/ML
1 INJECTION INTRACARDIAC; INTRAVENOUS
Status: DISCONTINUED | OUTPATIENT
Start: 2017-03-14 | End: 2017-03-14 | Stop reason: HOSPADM

## 2017-03-14 RX ORDER — NALOXONE HYDROCHLORIDE 0.4 MG/ML
0.4 INJECTION, SOLUTION INTRAMUSCULAR; INTRAVENOUS; SUBCUTANEOUS
Status: DISCONTINUED | OUTPATIENT
Start: 2017-03-14 | End: 2017-03-14 | Stop reason: HOSPADM

## 2017-03-14 RX ORDER — MIDAZOLAM HYDROCHLORIDE 1 MG/ML
.25-5 INJECTION, SOLUTION INTRAMUSCULAR; INTRAVENOUS
Status: DISCONTINUED | OUTPATIENT
Start: 2017-03-14 | End: 2017-03-14 | Stop reason: HOSPADM

## 2017-03-14 RX ORDER — SODIUM CHLORIDE 0.9 % (FLUSH) 0.9 %
5-10 SYRINGE (ML) INJECTION AS NEEDED
Status: DISCONTINUED | OUTPATIENT
Start: 2017-03-14 | End: 2017-03-14 | Stop reason: HOSPADM

## 2017-03-14 RX ORDER — PROPOFOL 10 MG/ML
INJECTION, EMULSION INTRAVENOUS AS NEEDED
Status: DISCONTINUED | OUTPATIENT
Start: 2017-03-14 | End: 2017-03-14 | Stop reason: HOSPADM

## 2017-03-14 RX ORDER — SODIUM CHLORIDE 9 MG/ML
75 INJECTION, SOLUTION INTRAVENOUS CONTINUOUS
Status: DISCONTINUED | OUTPATIENT
Start: 2017-03-14 | End: 2017-03-14 | Stop reason: HOSPADM

## 2017-03-14 RX ORDER — FENTANYL CITRATE 50 UG/ML
25 INJECTION, SOLUTION INTRAMUSCULAR; INTRAVENOUS
Status: DISCONTINUED | OUTPATIENT
Start: 2017-03-14 | End: 2017-03-14 | Stop reason: HOSPADM

## 2017-03-14 RX ORDER — SODIUM CHLORIDE 0.9 % (FLUSH) 0.9 %
5-10 SYRINGE (ML) INJECTION EVERY 8 HOURS
Status: DISCONTINUED | OUTPATIENT
Start: 2017-03-14 | End: 2017-03-14 | Stop reason: HOSPADM

## 2017-03-14 RX ORDER — ATROPINE SULFATE 0.1 MG/ML
0.5 INJECTION INTRAVENOUS
Status: DISCONTINUED | OUTPATIENT
Start: 2017-03-14 | End: 2017-03-14 | Stop reason: HOSPADM

## 2017-03-14 RX ORDER — LIDOCAINE HYDROCHLORIDE 20 MG/ML
INJECTION, SOLUTION EPIDURAL; INFILTRATION; INTRACAUDAL; PERINEURAL AS NEEDED
Status: DISCONTINUED | OUTPATIENT
Start: 2017-03-14 | End: 2017-03-14 | Stop reason: HOSPADM

## 2017-03-14 RX ORDER — DEXTROMETHORPHAN/PSEUDOEPHED 2.5-7.5/.8
1.2 DROPS ORAL
Status: DISCONTINUED | OUTPATIENT
Start: 2017-03-14 | End: 2017-03-14 | Stop reason: HOSPADM

## 2017-03-14 RX ORDER — LORAZEPAM 0.5 MG/1
0.5 TABLET ORAL
COMMUNITY
End: 2019-02-10

## 2017-03-14 RX ORDER — FLUMAZENIL 0.1 MG/ML
0.2 INJECTION INTRAVENOUS
Status: DISCONTINUED | OUTPATIENT
Start: 2017-03-14 | End: 2017-03-14 | Stop reason: HOSPADM

## 2017-03-14 RX ADMIN — SODIUM CHLORIDE 75 ML/HR: 900 INJECTION, SOLUTION INTRAVENOUS at 08:20

## 2017-03-14 RX ADMIN — PROPOFOL 300 MG: 10 INJECTION, EMULSION INTRAVENOUS at 08:46

## 2017-03-14 RX ADMIN — LIDOCAINE HYDROCHLORIDE 40 MG: 20 INJECTION, SOLUTION EPIDURAL; INFILTRATION; INTRACAUDAL; PERINEURAL at 08:30

## 2017-03-14 NOTE — ANESTHESIA PREPROCEDURE EVALUATION
Anesthetic History   No history of anesthetic complications            Review of Systems / Medical History  Patient summary reviewed, nursing notes reviewed and pertinent labs reviewed    Pulmonary              Pertinent negatives: No recent URI  Comments: Former Smoker   Neuro/Psych         Headaches (migraines) and psychiatric history     Cardiovascular  Within defined limits                Exercise tolerance: >4 METS  Comments: Denies hrt probs, chest pain    Takes inderal for headaches not BP   GI/Hepatic/Renal     GERD           Endo/Other  Within defined limits           Other Findings   Comments: Hx Bilateral ovarian cysts  Lupus-  On no meds  Chronic sinus infection         Physical Exam    Airway  Mallampati: II  TM Distance: 4 - 6 cm  Neck ROM: normal range of motion   Mouth opening: Normal     Cardiovascular  Regular rate and rhythm,  S1 and S2 normal,  no murmur, click, rub, or gallop  Rhythm: regular  Rate: normal         Dental  No notable dental hx       Pulmonary  Breath sounds clear to auscultation               Abdominal  GI exam deferred       Other Findings            Anesthetic Plan    ASA: 3  Anesthesia type: total IV anesthesia          Induction: Intravenous  Anesthetic plan and risks discussed with: Patient

## 2017-03-14 NOTE — ANESTHESIA POSTPROCEDURE EVALUATION
Post-Anesthesia Evaluation and Assessment    Patient: Colin Rico MRN: [id number]  SSN: [id number]    YOB: 1985  Age: 33 y.o. Sex: female       Cardiovascular Function/Vital Signs  Visit Vitals    /65    Pulse 70    Temp 37.1 °C (98.7 °F)    Resp 16    Ht 5' 5\" (1.651 m)    Wt 117.1 kg (258 lb 2 oz)    SpO2 100%    BMI 42.95 kg/m2       Patient is status post total IV anesthesia anesthesia for Procedure(s):  COLONOSCOPY. Nausea/Vomiting: None    Postoperative hydration reviewed and adequate. Pain:  Pain Scale 1: Visual (03/14/17 0849)  Pain Intensity 1: 0 (03/14/17 0849)   Managed    Neurological Status: At baseline    Mental Status and Level of Consciousness: Arousable    Pulmonary Status:   O2 Device: Room air (03/14/17 0849)   Adequate oxygenation and airway patent    Complications related to anesthesia: None    Post-anesthesia assessment completed.  No concerns    Signed By: Shawna Vaughn MD     March 14, 2017

## 2017-03-14 NOTE — PROCEDURES
NAME:  Rusty Aviles   :   1985   MRN:   [id number]     Date/Time:  3/14/2017 8:50 AM    Colonoscopy Operative Report    Procedure Type:   Colonoscopy --diagnostic     Indications:     lower abdominal pain  Pre-operative Diagnosis: see indication above  Post-operative Diagnosis:  See findings below  :  Lanette Adame MD  Referring Provider: --Lanette Adame MD    Exam:  Airway: clear, no airway problems anticipated  Heart: RRR, without gallops or rubs  Lungs: clear bilaterally without wheezes, crackles, or rhonchi  Abdomen: soft, nontender, nondistended, bowel sounds present  Mental Status: awake, alert and oriented to person, place and time    Sedation:  MAC anesthesia Propofol 300mg IV  Procedure Details:  After informed consent was obtained with all risks and benefits of procedure explained and preoperative exam completed, the patient was taken to the endoscopy suite and placed in the left lateral decubitus position. Upon sequential sedation as per above, a digital rectal exam was performed demonstrating internal hemorrhoids. The Olympus videocolonoscope  was inserted in the rectum and carefully advanced to the cecum, which was identified by the ileocecal valve and appendiceal orifice. The distal terminal ileum was evaluated. The quality of preparation was adequate. The colonoscope was slowly withdrawn with careful evaluation between folds. Retroflexion in the rectum was completed demonstrating internal hemorrhoids. Findings:     -Normal colonic and ileal mucosa without mass, polyp, or inflammation  -Small internal hemorrhoids    Specimen Removed:  None  Complications: None. EBL:  None. Impression:    -Normal colonic and ileal mucosa without mass, polyp, or inflammation  -Small internal hemorrhoids    Recommendations: --For colon cancer screening in this average-risk patient, colonoscopy may be repeated in 10 years. High fiber diet. Resume normal medication(s).          Discharge Disposition:  Home in the company of a  when able to ambulate.     Ronni Troncoso MD

## 2017-03-14 NOTE — IP AVS SNAPSHOT
[address] [facility] 
[phone] Patient: James Dill MRN: [id number] :1985 You are allergic to the following Allergen Reactions Reglan (Metoclopramide) Other (comments)  
 anxious Percocet (Oxycodone-Acetaminophen) Nausea and Vomiting Phenergan (Promethazine) Other (comments) Makes legs jump & needs benadryl with it Zofran (Ondansetron Hcl (Pf)) Other (comments) Per patient Lilian Mcneal me edgy\" Compazine (Prochlorperazine Edisylate) Other (comments) Patient felt very anxious and apprehensive after IV dose. Recent Documentation Height Weight BMI OB Status Smoking Status 1.651 m 117.1 kg 42.95 kg/m2 Having regular periods Former Smoker Emergency Contacts Name Discharge Info Relation Home Work Mobile [facility] DISCHARGE CAREGIVER [3] Mother [14] [phone] About your hospitalization You were admitted on:  2017 You last received care in the:  [facility] ENDOSCOPY You were discharged on:  2017 Unit phone number:  [phone] Why you were hospitalized Your primary diagnosis was:  Not on File Providers Seen During Your Hospitalizations Provider Role Specialty Primary office phone Girish Abdullahi MD Attending Provider Gastroenterology [phone] Your Primary Care Physician (PCP) Primary Care Physician Office Phone Office Fax [address] [place] [address] [phone] Follow-up Information None Current Discharge Medication List  
  
CONTINUE these medications which have NOT CHANGED Dose & Instructions Dispensing Information Comments Morning Noon Evening Bedtime  
 albuterol sulfate 90 mcg/actuation Aepb Your last dose was: Your next dose is: Other:  _________ Dose:  2 Puff Take 2 Puffs by inhalation four (4) times daily as needed for Cough (SOB, wheezing). Quantity:  1 Inhaler Refills:  0  
     
   
   
   
  
 ATIVAN 0.5 mg tablet Generic drug:  LORazepam  
   
Your last dose was: Your next dose is: Other:  _________ Dose:  0.5 mg Take 0.5 mg by mouth nightly. Refills:  0  
     
   
   
   
  
 cetirizine 10 mg tablet Commonly known as:  ZyrTEC Your last dose was: Your next dose is: Other:  _________ Dose:  10 mg Take 1 Tab by mouth daily. Quantity:  20 Tab Refills:  0  
     
   
   
   
  
 propranolol 20 mg tablet Commonly known as:  INDERAL Your last dose was: Your next dose is: Other:  _________ Dose:  20 mg Take 20 mg by mouth two (2) times a day. Refills:  0 SUMAtriptan 25 mg tablet Commonly known as:  IMITREX Your last dose was: Your next dose is: Other:  _________ Dose:  25 mg Take 25 mg by mouth once as needed for Migraine. Refills:  0 Discharge Instructions [employer] [phone] 
1985 COLON DISCHARGE INSTRUCTIONS Discomfort: 
Redness at IV site- apply warm compress to area; if redness or soreness persist- contact your physician There may be a slight amount of blood passed from the rectum Gaseous discomfort- walking, belching will help relieve any discomfort You may not operate a vehicle for 12 hours You may not engage in an occupation involving machinery or appliances for rest of today You may not drink alcoholic beverages for at least 12 hours Avoid making any critical decisions for at least 24 hour DIET: 
 High fiber diet.  however -  remember your colon is empty and a heavy meal will produce gas. Avoid these foods:  vegetables, fried / greasy foods, carbonated drinks for today MEDICATION: 
 
 
  
ACTIVITY: 
 You may not resume your normal daily activities until tomorrow AM; it is recommended that you spend the remainder of the day resting -  avoid any strenuous activity. CALL M.D. ANY SIGN OF: Increasing pain, nausea, vomiting Abdominal distension (swelling) New increased bleeding (oral or rectal) Fever (chills) IMPRESSION: 
-Normal colonic and ileal mucosa without mass, polyp, or inflammation 
-Small internal hemorrhoids Follow-up Instructions: 
 Call Dr. Obed Ramon if questions arise regarding your procedure Telephone # [phone] Repeat colonoscopy in 10 years Edmundo Fairbanks MD 
 
MyChart Activation Thank you for requesting access to Snapflow. Please follow the instructions below to securely access and download your online medical record. Snapflow allows you to send messages to your doctor, view your test results, renew your prescriptions, schedule appointments, and more. How Do I Sign Up? 1. In your internet browser, go to [email or web address] 
2. Click on the First Time User? Click Here link in the Sign In box. You will be redirect to the New Member Sign Up page. 3. Enter your Snapflow Access Code exactly as it appears below. You will not need to use this code after youve completed the sign-up process. If you do not sign up before the expiration date, you must request a new code. Snapflow Access Code: [email or web address] Expires: 3/31/2017  1:03 AM (This is the date your Snapflow access code will ) 4. Enter the last four digits of your Social Security Number (xxxx) and Date of Birth (mm/dd/yyyy) as indicated and click Submit. You will be taken to the next sign-up page. 5. Create a Snapflow ID. This will be your Snapflow login ID and cannot be changed, so think of one that is secure and easy to remember. 6. Create a Snapflow password. You can change your password at any time. 7. Enter your Password Reset Question and Answer. This can be used at a later time if you forget your password. 8. Enter your e-mail address. You will receive e-mail notification when new information is available in [address] 9. Click Sign Up. You can now view and download portions of your medical record. 10. Click the Download Summary menu link to download a portable copy of your medical information. Additional Information If you have questions, please visit the Frequently Asked Questions section of the [employer] website at [email or web address] Remember, [employer] is NOT to be used for urgent needs. For medical emergencies, dial 911. Discharge Orders None Introducing [facility] SERVICES! Nilda Brock introduces [employer] patient portal. Now you can access parts of your medical record, email your doctor's office, and request medication refills online. 1. In your internet browser, go to [email or web address] 2. Click on the First Time User? Click Here link in the Sign In box. You will see the New Member Sign Up page. 3. Enter your [employer] Access Code exactly as it appears below. You will not need to use this code after youve completed the sign-up process. If you do not sign up before the expiration date, you must request a new code. · [employer] Access Code: [email or web address] Expires: 3/31/2017  1:03 AM 
 
4. Enter the last four digits of your Social Security Number (xxxx) and Date of Birth (mm/dd/yyyy) as indicated and click Submit. You will be taken to the next sign-up page. 5. Create a [employer] ID. This will be your [employer] login ID and cannot be changed, so think of one that is secure and easy to remember. 6. Create a [employer] password. You can change your password at any time. 7. Enter your Password Reset Question and Answer. This can be used at a later time if you forget your password. 8. Enter your e-mail address. You will receive e-mail notification when new information is available in [address] 9. Click Sign Up. You can now view and download portions of your medical record. 10. Click the Download Summary menu link to download a portable copy of your medical information. If you have questions, please visit the Frequently Asked Questions section of the [employer] website. Remember, [employer] is NOT to be used for urgent needs. For medical emergencies, dial 911. Now available from your iPhone and Android! General Information Please provide this summary of care documentation to your next provider. Patient Signature:  ____________________________________________________________ Date:  ____________________________________________________________  
  
Ketan Grant Provider Signature:  ____________________________________________________________ Date:  ____________________________________________________________

## 2017-03-14 NOTE — DISCHARGE INSTRUCTIONS
[facility]  [id number]  1985    COLON DISCHARGE INSTRUCTIONS  Discomfort:  Redness at IV site- apply warm compress to area; if redness or soreness persist- contact your physician  There may be a slight amount of blood passed from the rectum  Gaseous discomfort- walking, belching will help relieve any discomfort  You may not operate a vehicle for 12 hours  You may not engage in an occupation involving machinery or appliances for rest of today  You may not drink alcoholic beverages for at least 12 hours  Avoid making any critical decisions for at least 24 hour  DIET:   High fiber diet. - however -  remember your colon is empty and a heavy meal will produce gas. Avoid these foods:  vegetables, fried / greasy foods, carbonated drinks for today  MEDICATION:         ACTIVITY:  You may not resume your normal daily activities until tomorrow AM; it is recommended that you spend the remainder of the day resting -  avoid any strenuous activity. CALL M.D. ANY SIGN OF:   Increasing pain, nausea, vomiting  Abdominal distension (swelling)  New increased bleeding (oral or rectal)  Fever (chills)    IMPRESSION:  -Normal colonic and ileal mucosa without mass, polyp, or inflammation  -Small internal hemorrhoids    Follow-up Instructions:   Call Dr. Maria D Jackson if questions arise regarding your procedure  Telephone # [phone]  Repeat colonoscopy in 10 years    Jayce Hillman MD    [address] [address]    Thank you for requesting access to [employer]. Please follow the instructions below to securely access and download your online medical record. [employer] allows you to send messages to your doctor, view your test results, renew your prescriptions, schedule appointments, and more. How Do I Sign Up? 1. In your internet browser, go to [email or web address]  2. Click on the First Time User? Click Here link in the Sign In box. You will be redirect to the New Member Sign Up page.   3. Enter your [employer] Access Code exactly as it appears below. You will not need to use this code after youve completed the sign-up process. If you do not sign up before the expiration date, you must request a new code. [employer] Access Code: [email or web address]  Expires: 3/31/2017  1:03 AM (This is the date your [employer] access code will )    4. Enter the last four digits of your Social Security Number (xxxx) and Date of Birth (mm/dd/yyyy) as indicated and click Submit. You will be taken to the next sign-up page. 5. Create a [employer] ID. This will be your [employer] login ID and cannot be changed, so think of one that is secure and easy to remember. 6. Create a [employer] password. You can change your password at any time. 7. Enter your Password Reset Question and Answer. This can be used at a later time if you forget your password. 8. Enter your e-mail address. You will receive e-mail notification when new information is available in [address] 9. Click Sign Up. You can now view and download portions of your medical record. 10. Click the Download Summary menu link to download a portable copy of your medical information. Additional Information    If you have questions, please visit the Frequently Asked Questions section of the [employer] website at [email or web address] Remember, [employer] is NOT to be used for urgent needs. For medical emergencies, dial 911.

## 2017-03-14 NOTE — H&P
Gastroenterology Outpatient History and Physical    Patient: Willow Webb    Physician: Arthur Mejia MD    Chief Complaint: lower abd pain  History of Present Illness: 28yo F with chronic constipation. Denies abd pain. Reports hx of colon polyp. History:  Past Medical History:   Diagnosis Date    Bilateral ovarian cysts     GERD (gastroesophageal reflux disease)     Lupus (HCC)     Migraine     Neurological disorder     migraines     Other ill-defined conditions(799.89)     lupus    Psychiatric disorder       Past Surgical History:   Procedure Laterality Date    HX GI      LAPAROSCOPY    HX HEENT  4/4/16    sinus surgery    HX HERNIA REPAIR      BILATERAL INGUINAL HERNIA REPAIR    HX OTHER SURGICAL      hemmorhoid, hernia-groin      Social History     Social History    Marital status: SINGLE     Spouse name: N/A    Number of children: N/A    Years of education: N/A     Social History Main Topics    Smoking status: Former Smoker     Years: 1.00     Quit date: 3/31/2014    Smokeless tobacco: Never Used    Alcohol use No    Drug use: No    Sexual activity: Not Asked     Other Topics Concern    None     Social History Narrative      Family History   Problem Relation Age of Onset    Anesth Problems Neg Hx       Patient Active Problem List   Diagnosis Code    Chronic sinus infection J32.9       Allergies: Allergies   Allergen Reactions    Reglan [Metoclopramide] Other (comments)     anxious    Percocet [Oxycodone-Acetaminophen] Nausea and Vomiting    Phenergan [Promethazine] Other (comments)     Makes legs jump & needs benadryl with it    Zofran [Ondansetron Hcl (Pf)] Other (comments)     Per patient \"makes me edgy\"    Compazine [Prochlorperazine Edisylate] Other (comments)     Patient felt very anxious and apprehensive after IV dose. Medications:   Prior to Admission medications    Medication Sig Start Date End Date Taking?  Authorizing Provider   cetirizine (ZYRTEC) 10 mg tablet Take 1 Tab by mouth daily. 2/16/17  Yes Margarita Claudio PA-C   promethazine (PHENERGAN) 25 mg suppository Insert 1 Suppository into rectum every six (6) hours as needed for Nausea. 2/16/17  Yes Margarita Claudio PA-C   propranolol (INDERAL) 20 mg tablet Take 20 mg by mouth two (2) times a day. Yes Severiano Arias MD   SUMAtriptan (IMITREX) 25 mg tablet Take 25 mg by mouth once as needed for Migraine. Yes Severiano Arias MD   albuterol sulfate 90 mcg/actuation aepb Take 2 Puffs by inhalation four (4) times daily as needed for Cough (SOB, wheezing). 9/3/16   Bunny Gosselin, NP     Physical Exam:   Vital Signs: Height 5' 5\" (1.651 m), weight 117.1 kg (258 lb 2 oz).   General: well developed, well nourished   HEENT: unremarkable   Heart: regular rhythm no mumur    Lungs: clear   Abdominal:  benign   Neurological: unremarkable   Extremities: no edema     Findings/Diagnosis: lower abd pain  Plan of Care/Planned Procedure: Colonoscopy with conscious/deep sedation    Signed:  Jalen Santana MD 3/14/2017

## 2017-03-14 NOTE — PERIOP NOTES
Anesthesia reports 300 mg Propofol, 40 mg Lidocaine and 700 mL NS given during procedure. Received report from anesthesia staff on vital signs and status of patient.

## 2017-03-14 NOTE — PERIOP NOTES
[facility]  1985  [id number]    Situation:  Verbal report received from: JUDITH Mendoza rn  Procedure: Procedure(s):  COLONOSCOPY    Background:    Preoperative diagnosis: LOWER ABDOMINAL PAIN  Postoperative diagnosis: internal hemorrhoids    :  Dr. Loy Cifuentes  Assistant(s): Endoscopy Technician-1: Bobbi Abebe  Endoscopy RN-1: Jenny Lin    Specimens: * No specimens in log *  H. Pylori  no    Assessment:  Intra-procedure medications   Anesthesia gave intra-procedure sedation and medications, see anesthesia flow sheet yes    Intravenous fluids: NS@ KVO     Vital signs stable     Abdominal assessment: round and soft     Recommendation:  Discharge patient per MD order.     Family or Friend   Permission to share finding with family or friend yes

## 2017-03-14 NOTE — PERIOP NOTES
Patient wheeled to waiting car for d/c home with mother. States having all belongings.   States still has headache but will take medication when home and rest.

## 2018-02-04 ENCOUNTER — APPOINTMENT (OUTPATIENT)
Dept: GENERAL RADIOLOGY | Age: 33
End: 2018-02-04
Attending: EMERGENCY MEDICINE
Payer: MEDICAID

## 2018-02-04 ENCOUNTER — HOSPITAL ENCOUNTER (OUTPATIENT)
Age: 33
Setting detail: OBSERVATION
Discharge: HOME | End: 2018-02-06
Attending: EMERGENCY MEDICINE | Admitting: INTERNAL MEDICINE
Payer: MEDICAID

## 2018-02-04 DIAGNOSIS — R11.2 INTRACTABLE VOMITING WITH NAUSEA, UNSPECIFIED VOMITING TYPE: Primary | ICD-10-CM

## 2018-02-04 PROBLEM — K52.9 GASTROENTERITIS: Status: ACTIVE | Noted: 2018-02-04

## 2018-02-04 PROBLEM — G43.909 MIGRAINE: Status: ACTIVE | Noted: 2018-02-04

## 2018-02-04 LAB
ALBUMIN SERPL-MCNC: 3.9 G/DL (ref 3.5–5)
ALBUMIN/GLOB SERPL: 1 {RATIO} (ref 1.1–2.2)
ALP SERPL-CCNC: 79 U/L (ref 45–117)
ALT SERPL-CCNC: 26 U/L (ref 12–78)
AMPHET UR QL SCN: POSITIVE
ANION GAP SERPL CALC-SCNC: 12 MMOL/L (ref 5–15)
APPEARANCE UR: ABNORMAL
AST SERPL-CCNC: 29 U/L (ref 15–37)
BACTERIA URNS QL MICRO: NEGATIVE /HPF
BARBITURATES UR QL SCN: NEGATIVE
BASOPHILS # BLD: 0 K/UL (ref 0–0.1)
BASOPHILS NFR BLD: 0 % (ref 0–1)
BENZODIAZ UR QL: NEGATIVE
BILIRUB SERPL-MCNC: 0.5 MG/DL (ref 0.2–1)
BILIRUB UR QL: NEGATIVE
BUN SERPL-MCNC: 15 MG/DL (ref 6–20)
BUN/CREAT SERPL: 16 (ref 12–20)
CALCIUM SERPL-MCNC: 9.3 MG/DL (ref 8.5–10.1)
CANNABINOIDS UR QL SCN: NEGATIVE
CHLORIDE SERPL-SCNC: 105 MMOL/L (ref 97–108)
CO2 SERPL-SCNC: 24 MMOL/L (ref 21–32)
COCAINE UR QL SCN: NEGATIVE
COLOR UR: ABNORMAL
CREAT SERPL-MCNC: 0.96 MG/DL (ref 0.55–1.02)
DIFFERENTIAL METHOD BLD: ABNORMAL
DRUG SCRN COMMENT,DRGCM: ABNORMAL
EOSINOPHIL # BLD: 0.1 K/UL (ref 0–0.4)
EOSINOPHIL NFR BLD: 0 % (ref 0–7)
EPITH CASTS URNS QL MICRO: ABNORMAL /LPF
ERYTHROCYTE [DISTWIDTH] IN BLOOD BY AUTOMATED COUNT: 13.2 % (ref 11.5–14.5)
GLOBULIN SER CALC-MCNC: 4.1 G/DL (ref 2–4)
GLUCOSE SERPL-MCNC: 122 MG/DL (ref 65–100)
GLUCOSE UR STRIP.AUTO-MCNC: NEGATIVE MG/DL
HCG UR QL: NEGATIVE
HCT VFR BLD AUTO: 43.5 % (ref 35–47)
HGB BLD-MCNC: 14.3 G/DL (ref 11.5–16)
HGB UR QL STRIP: ABNORMAL
HYALINE CASTS URNS QL MICRO: ABNORMAL /LPF (ref 0–5)
IMM GRANULOCYTES # BLD: 0 K/UL (ref 0–0.04)
IMM GRANULOCYTES NFR BLD AUTO: 0 % (ref 0–0.5)
KETONES UR QL STRIP.AUTO: NEGATIVE MG/DL
LEUKOCYTE ESTERASE UR QL STRIP.AUTO: ABNORMAL
LIPASE SERPL-CCNC: 141 U/L (ref 73–393)
LYMPHOCYTES # BLD: 1.6 K/UL (ref 0.8–3.5)
LYMPHOCYTES NFR BLD: 14 % (ref 12–49)
MCH RBC QN AUTO: 27.9 PG (ref 26–34)
MCHC RBC AUTO-ENTMCNC: 32.9 G/DL (ref 30–36.5)
MCV RBC AUTO: 84.8 FL (ref 80–99)
METHADONE UR QL: NEGATIVE
MONOCYTES # BLD: 0.7 K/UL (ref 0–1)
MONOCYTES NFR BLD: 6 % (ref 5–13)
NEUTS SEG # BLD: 9.1 K/UL (ref 1.8–8)
NEUTS SEG NFR BLD: 80 % (ref 32–75)
NITRITE UR QL STRIP.AUTO: NEGATIVE
NRBC # BLD: 0 K/UL (ref 0–0.01)
NRBC BLD-RTO: 0 PER 100 WBC
OPIATES UR QL: NEGATIVE
PCP UR QL: NEGATIVE
PH UR STRIP: 5 [PH] (ref 5–8)
PLATELET # BLD AUTO: 255 K/UL (ref 150–400)
PMV BLD AUTO: 10.5 FL (ref 8.9–12.9)
POTASSIUM SERPL-SCNC: 4.2 MMOL/L (ref 3.5–5.1)
PROT SERPL-MCNC: 8 G/DL (ref 6.4–8.2)
PROT UR STRIP-MCNC: NEGATIVE MG/DL
RBC # BLD AUTO: 5.13 M/UL (ref 3.8–5.2)
RBC #/AREA URNS HPF: ABNORMAL /HPF (ref 0–5)
SODIUM SERPL-SCNC: 141 MMOL/L (ref 136–145)
SP GR UR REFRACTOMETRY: 1.02 (ref 1–1.03)
UR CULT HOLD, URHOLD: NORMAL
UROBILINOGEN UR QL STRIP.AUTO: 0.2 EU/DL (ref 0.2–1)
WBC # BLD AUTO: 11.5 K/UL (ref 3.6–11)
WBC URNS QL MICRO: ABNORMAL /HPF (ref 0–4)

## 2018-02-04 PROCEDURE — 96361 HYDRATE IV INFUSION ADD-ON: CPT

## 2018-02-04 PROCEDURE — 36415 COLL VENOUS BLD VENIPUNCTURE: CPT | Performed by: EMERGENCY MEDICINE

## 2018-02-04 PROCEDURE — 74011250637 HC RX REV CODE- 250/637: Performed by: INTERNAL MEDICINE

## 2018-02-04 PROCEDURE — 81025 URINE PREGNANCY TEST: CPT

## 2018-02-04 PROCEDURE — 96376 TX/PRO/DX INJ SAME DRUG ADON: CPT

## 2018-02-04 PROCEDURE — 85025 COMPLETE CBC W/AUTO DIFF WBC: CPT | Performed by: EMERGENCY MEDICINE

## 2018-02-04 PROCEDURE — 83690 ASSAY OF LIPASE: CPT | Performed by: EMERGENCY MEDICINE

## 2018-02-04 PROCEDURE — 99218 HC RM OBSERVATION: CPT

## 2018-02-04 PROCEDURE — 96375 TX/PRO/DX INJ NEW DRUG ADDON: CPT

## 2018-02-04 PROCEDURE — 74011000258 HC RX REV CODE- 258: Performed by: EMERGENCY MEDICINE

## 2018-02-04 PROCEDURE — 87086 URINE CULTURE/COLONY COUNT: CPT | Performed by: EMERGENCY MEDICINE

## 2018-02-04 PROCEDURE — 74011250636 HC RX REV CODE- 250/636: Performed by: EMERGENCY MEDICINE

## 2018-02-04 PROCEDURE — 96365 THER/PROPH/DIAG IV INF INIT: CPT

## 2018-02-04 PROCEDURE — 74011000258 HC RX REV CODE- 258: Performed by: INTERNAL MEDICINE

## 2018-02-04 PROCEDURE — 80307 DRUG TEST PRSMV CHEM ANLYZR: CPT | Performed by: INTERNAL MEDICINE

## 2018-02-04 PROCEDURE — 74011250636 HC RX REV CODE- 250/636: Performed by: INTERNAL MEDICINE

## 2018-02-04 PROCEDURE — 80053 COMPREHEN METABOLIC PANEL: CPT | Performed by: EMERGENCY MEDICINE

## 2018-02-04 PROCEDURE — 74022 RADEX COMPL AQT ABD SERIES: CPT

## 2018-02-04 PROCEDURE — 81001 URINALYSIS AUTO W/SCOPE: CPT | Performed by: EMERGENCY MEDICINE

## 2018-02-04 PROCEDURE — 99283 EMERGENCY DEPT VISIT LOW MDM: CPT

## 2018-02-04 RX ORDER — SODIUM CHLORIDE 0.9 % (FLUSH) 0.9 %
5-10 SYRINGE (ML) INJECTION EVERY 8 HOURS
Status: DISCONTINUED | OUTPATIENT
Start: 2018-02-04 | End: 2018-02-06 | Stop reason: HOSPADM

## 2018-02-04 RX ORDER — BUPROPION HYDROCHLORIDE 150 MG/1
150 TABLET, EXTENDED RELEASE ORAL DAILY
COMMUNITY
End: 2020-11-23 | Stop reason: CLARIF

## 2018-02-04 RX ORDER — SODIUM CHLORIDE 9 MG/ML
75 INJECTION, SOLUTION INTRAVENOUS CONTINUOUS
Status: DISCONTINUED | OUTPATIENT
Start: 2018-02-04 | End: 2018-02-05

## 2018-02-04 RX ORDER — ENOXAPARIN SODIUM 100 MG/ML
40 INJECTION SUBCUTANEOUS DAILY
Status: DISCONTINUED | OUTPATIENT
Start: 2018-02-04 | End: 2018-02-06 | Stop reason: HOSPADM

## 2018-02-04 RX ORDER — SODIUM CHLORIDE 0.9 % (FLUSH) 0.9 %
5-10 SYRINGE (ML) INJECTION AS NEEDED
Status: DISCONTINUED | OUTPATIENT
Start: 2018-02-04 | End: 2018-02-06 | Stop reason: HOSPADM

## 2018-02-04 RX ORDER — VITAMIN E 268 MG
400 CAPSULE ORAL DAILY
COMMUNITY

## 2018-02-04 RX ORDER — DIPHENHYDRAMINE HYDROCHLORIDE 50 MG/ML
50 INJECTION, SOLUTION INTRAMUSCULAR; INTRAVENOUS
Status: COMPLETED | OUTPATIENT
Start: 2018-02-04 | End: 2018-02-04

## 2018-02-04 RX ORDER — ACETAMINOPHEN 325 MG/1
650 TABLET ORAL
Status: DISCONTINUED | OUTPATIENT
Start: 2018-02-04 | End: 2018-02-06 | Stop reason: HOSPADM

## 2018-02-04 RX ORDER — PHENTERMINE HYDROCHLORIDE 37.5 MG/1
37.5 TABLET ORAL
COMMUNITY

## 2018-02-04 RX ADMIN — SODIUM CHLORIDE 75 ML/HR: 9 INJECTION, SOLUTION INTRAVENOUS at 14:17

## 2018-02-04 RX ADMIN — Medication 10 ML: at 14:18

## 2018-02-04 RX ADMIN — PROMETHAZINE HYDROCHLORIDE 12.5 MG: 25 INJECTION INTRAMUSCULAR; INTRAVENOUS at 15:04

## 2018-02-04 RX ADMIN — ACETAMINOPHEN 650 MG: 325 TABLET ORAL at 15:02

## 2018-02-04 RX ADMIN — SODIUM CHLORIDE 1000 ML: 900 INJECTION, SOLUTION INTRAVENOUS at 07:35

## 2018-02-04 RX ADMIN — ACETAMINOPHEN 650 MG: 325 TABLET ORAL at 21:05

## 2018-02-04 RX ADMIN — PROMETHAZINE HYDROCHLORIDE 25 MG: 25 INJECTION INTRAMUSCULAR; INTRAVENOUS at 08:59

## 2018-02-04 RX ADMIN — Medication 10 ML: at 20:04

## 2018-02-04 RX ADMIN — PROMETHAZINE HYDROCHLORIDE 25 MG: 25 INJECTION INTRAMUSCULAR; INTRAVENOUS at 20:03

## 2018-02-04 RX ADMIN — SODIUM CHLORIDE 1000 ML: 900 INJECTION, SOLUTION INTRAVENOUS at 11:24

## 2018-02-04 RX ADMIN — DIPHENHYDRAMINE HYDROCHLORIDE 50 MG: 50 INJECTION, SOLUTION INTRAMUSCULAR; INTRAVENOUS at 08:58

## 2018-02-04 NOTE — ED TRIAGE NOTES
Pt c/o vomiting since the middle of the night and generalized body aches and stomach cramps. Pt has not taken anything for symptoms PTA.

## 2018-02-04 NOTE — H&P
[address]  [address] [facility], [facility] 19  [phone]    Admission History and Physical      NAME:  nEa Alba   :   1985   MRN:  [id number]     PCP:  José Luis Nichols MD     Date/Time:  2018         Subjective:     CHIEF COMPLAINT: nausea and vomiting     HISTORY OF PRESENT ILLNESS:     Ms. Renae Damon is a 27 y. o. with h/o migraine who presents with nausea and vomiting. Symptoms started at 1am. No associated diarrhea. No suspect food for possible food poisoning. Has mild abdominal pain in bandlike sensation around belly. Denies blood in stools. Her son was sick with similar symptoms 2 days ago.       Past Medical History:   Diagnosis Date    Bilateral ovarian cysts     GERD (gastroesophageal reflux disease)     Lupus (HCC)     Migraine     Neurological disorder     migraines     Other ill-defined conditions(799.89)     lupus    Psychiatric disorder         Past Surgical History:   Procedure Laterality Date    COLONOSCOPY N/A 3/14/2017    COLONOSCOPY performed by Murphy Reyez MD at [facility] ENDOSCOPY    COLONOSCOPY,DIAGNOSTIC  3/14/2017         HX GI      LAPAROSCOPY    HX HEENT  16    sinus surgery    HX HERNIA REPAIR      BILATERAL INGUINAL HERNIA REPAIR    HX OTHER SURGICAL      hemmorhoid, hernia-groin       Social History   Substance Use Topics    Smoking status: Former Smoker     Years: 1.00     Quit date: 3/31/2014    Smokeless tobacco: Never Used    Alcohol use No        Family History   Problem Relation Age of Onset    Anesth Problems Neg Hx         Allergies   Allergen Reactions    Reglan [Metoclopramide] Other (comments)     anxious    Percocet [Oxycodone-Acetaminophen] Nausea and Vomiting    Phenergan [Promethazine] Other (comments)     Makes legs jump & needs benadryl with it    Zofran [Ondansetron Hcl (Pf)] Other (comments)     Per patient \"makes me edgy\"    Compazine [Prochlorperazine Edisylate] Other (comments)     Patient felt very anxious and apprehensive after IV dose. Prior to Admission medications    Medication Sig Start Date End Date Taking? Authorizing Provider   LORazepam (ATIVAN) 0.5 mg tablet Take 0.5 mg by mouth nightly. Historical Provider   cetirizine (ZYRTEC) 10 mg tablet Take 1 Tab by mouth daily. 2/16/17   TAVIA Dean   propranolol (INDERAL) 20 mg tablet Take 20 mg by mouth two (2) times a day. Severiano Arias MD   albuterol sulfate 90 mcg/actuation aepb Take 2 Puffs by inhalation four (4) times daily as needed for Cough (SOB, wheezing). 9/3/16   Femi Garcia NP   SUMAtriptan (IMITREX) 25 mg tablet Take 25 mg by mouth once as needed for Migraine.     Severiano Arias MD         Review of Systems:    Gen:  Eyes:  ENT:  CVS:  Pulm:  GI:  nausea, emesis  :    MS:  Skin:  Psych:  Endo:    Hem:  Renal:    Neuro:            Objective:      VITALS:    Vital signs reviewed; most recent are:    Visit Vitals    /83    Pulse 93    Temp 97.5 °F (36.4 °C)    Resp 18    Ht 5' 5\" (1.651 m)    Wt 95.3 kg (210 lb)    SpO2 100%    BMI 34.95 kg/m2     SpO2 Readings from Last 6 Encounters:   02/04/18 100%   03/14/17 99%   02/16/17 97%   01/10/17 95%   12/31/16 95%   09/03/16 97%        No intake or output data in the 24 hours ending 02/04/18 1328         Exam:     Physical Exam:    Gen:  Well-developed, well-nourished, in no acute distress  HEENT:  Pink conjunctivae, PERRL, hearing intact to voice, moist mucous membranes  Neck:  Supple, without masses, thyroid non-tender  Resp:  No accessory muscle use, clear breath sounds without wheezes rales or rhonchi  Card:  No murmurs, normal S1, S2 without thrills, bruits or peripheral edema  Abd:  Soft, non-tender, non-distended, normoactive bowel sounds are present, no palpable organomegaly  Lymph:  No cervical adenopathy  Musc:  No cyanosis or clubbing  Skin:  No rashes or ulcers, skin turgor is good  Neuro:  Cranial nerves 3-12 are grossly intact,  strength is 5/5 bilaterally, dorsi / plantarflexion strength is 5/5 bilaterally, follows commands appropriately  Psych:  Alert with good insight. Oriented to person, place, and time       Labs:    Recent Labs      02/04/18   0737   WBC  11.5*   HGB  14.3   HCT  43.5   PLT  255     Recent Labs      02/04/18   0737   NA  141   K  4.2   CL  105   CO2  24   GLU  122*   BUN  15   CREA  0.96   CA  9.3   ALB  3.9   SGOT  29   ALT  26     No components found for: GLPOC  No results for input(s): PH, PCO2, PO2, HCO3, FIO2 in the last 72 hours. No results for input(s): INR in the last 72 hours. No lab exists for component: INREXT         Assessment/Plan:       Principal Problem:    Nausea & vomiting: suspect viral gastroenteritis. Send UDS. Start supportive care with IVF, anti-emetics    Active Problems:    Migraine: not contributing to current issues. Gastroenteritis: likely viral. Abdominal exam benign, no indication for imaging at this time.  Start supportive care as abive       Total time spent with patient: [address] discussed with: Patient and Family    Discussed:  Care Plan    Prophylaxis:  Lovenox    Probable Disposition:  Home w/Family           ___________________________________________________    Attending Physician: Олег Fritz MD

## 2018-02-04 NOTE — PROGRESS NOTES
[facility]: MED RECONCILIATION    Comments/Recommendations:    Patient in discomfort during interview. Medication(s) ADDED to PTA list:  1. Bupropion  mg daily ([id number] mg BID)- prescribing MD aware of frequency change  2. Phentermine 37.5 mg daily  3. Vit E 400 units daily    Medication(s) REMOVED from PTA list:  1. none    Medication(s) ADJUSTED on PTA list:  1. Lorazepam updated to \"BID prn\" as written (from [facility])  2. Propranolol changed to \"daily\" (prescribed BID)- prescribing MD aware of frequency change      Information obtained from: Rx Query/ Patient    Allergies: Reglan [metoclopramide]; Percocet [oxycodone-acetaminophen]; Phenergan [promethazine]; Zofran [ondansetron hcl (pf)]; and Compazine [prochlorperazine edisylate]    Prior to Admission Medications:     Prior to Admission Medications   Prescriptions Last Dose Informant Patient Reported? Taking? LORazepam (ATIVAN) 0.5 mg tablet   Yes Yes   Sig: Take 0.5 mg by mouth two (2) times daily as needed. SUMAtriptan (IMITREX) 25 mg tablet   Yes Yes   Sig: Take 25 mg by mouth once as needed for Migraine. albuterol sulfate 90 mcg/actuation aepb   No Yes   Sig: Take 2 Puffs by inhalation four (4) times daily as needed for Cough (SOB, wheezing). buPROPion SR (WELLBUTRIN SR) 150 mg SR tablet 2/3/2018 at Unknown time  Yes Yes   Sig: Take 150 mg by mouth daily. cetirizine (ZYRTEC) 10 mg tablet 2/3/2018 at Unknown time  No Yes   Sig: Take 1 Tab by mouth daily. phentermine (ADIPEX-P) 37.5 mg tablet 2/3/2018 at Unknown time  Yes Yes   Sig: Take 37.5 mg by mouth every morning. propranolol (INDERAL) 20 mg tablet 2/3/2018 at Unknown time  Yes Yes   Sig: Take 20 mg by mouth daily. vitamin E (AQUA GEMS) 400 unit capsule 2/3/2018 at Unknown time  Yes Yes   Sig: Take 400 Units by mouth daily.       Facility-Administered Medications: None            Josafat Anand PHARMD   Contact: [phone]

## 2018-02-04 NOTE — ED PROVIDER NOTES
HPI Comments: 67-year-old female with a history of polycystic ovarian syndrome presents with vomiting that started at 1 AM approximately 6 hours ago. Woke her up out of sleep. She also describes diffuse crampy abdominal pain. States that she was just in the hospital with her son who had a petechial rash and was determined to have a GI illness and was discharged from [facility]. She's had subjective fevers and chills. She denies any changes in her bowel movements. She denies any changes in her urination       Past Medical History:   Diagnosis Date    Bilateral ovarian cysts     GERD (gastroesophageal reflux disease)     Lupus (HCC)     Migraine     Neurological disorder     migraines     Other ill-defined conditions(799.89)     lupus    Psychiatric disorder        Past Surgical History:   Procedure Laterality Date    COLONOSCOPY N/A 3/14/2017    COLON[facility]COPY performed by Henry Michel MD at [facility] ENDOSCOPY    COLONOSCOPY,DIAGNOSTIC  3/14/2017         HX GI      LAPAROSCOPY    HX HEENT  4/4/16    sinus surgery    HX HERNIA REPAIR      BILATERAL INGUINAL HERNIA REPAIR    HX OTHER SURGICAL      hemmorhoid, hernia-groin         Family History:   Problem Relation Age of Onset    Anesth Problems Neg Hx        Social History     Social History    Marital status: SINGLE     Spouse name: N/A    Number of children: N/A    Years of education: N/A     Occupational History    Not on file. Social History Main Topics    Smoking status: Former Smoker     Years: 1.00     Quit date: 3/31/2014    Smokeless tobacco: Never Used    Alcohol use No    Drug use: No    Sexual activity: Not on file     Other Topics Concern    Not on file     Social History Narrative         ALLERGIES: Reglan [metoclopramide]; Percocet [oxycodone-acetaminophen]; Phenergan [promethazine]; Zofran [ondansetron hcl (pf)]; and Compazine [prochlorperazine edisylate]    Review of Systems   Constitutional: Negative for chills and fever.    HENT: Negative for ear pain and sore throat. Eyes: Negative for pain. Respiratory: Negative for chest tightness and shortness of breath. Cardiovascular: Negative for chest pain and leg swelling. Gastrointestinal: Negative for abdominal pain, nausea and vomiting. Genitourinary: Negative for dysuria and flank pain. Musculoskeletal: Negative for back pain. Skin: Negative for rash. Neurological: Negative for headaches. All other systems reviewed and are negative. Vitals:    02/04/18 0722 02/04/18 0945   BP: 136/87 108/65   Pulse: (!) 105    Resp: 16    Temp: 97.5 °F (36.4 °C)    SpO2: 99% 99%   Weight: 95.3 kg (210 lb)    Height: 5' 5\" (1.651 m)             Physical Exam   Constitutional: She appears well-developed and well-nourished. HENT:   Head: Normocephalic and atraumatic. Mouth/Throat: Oropharynx is clear and moist.   Eyes: Conjunctivae and EOM are normal. Pupils are equal, round, and reactive to light. No scleral icterus. Neck: Neck supple. No tracheal deviation present. Cardiovascular: Normal rate, regular rhythm, normal heart sounds and intact distal pulses. Pulmonary/Chest: Effort normal and breath sounds normal. No respiratory distress. Abdominal: Soft. She exhibits no distension. There is no tenderness. There is no rebound and no guarding. Genitourinary:   Genitourinary Comments: deferred   Musculoskeletal: She exhibits no edema. Neurological: She is alert. Skin: Skin is warm and dry. Psychiatric: She has a normal mood and affect. Nursing note and vitals reviewed. MDM  Number of Diagnoses or Management Options  Intractable vomiting with nausea, unspecified vomiting type:   Diagnosis management comments: 78-year-old female with vomiting and differential diagnosis of LISSETH, pancreatitis, dehydration        ED Course   10:42 AM  Patient still nauseous. Vomited at 1000. Additional phenergan and IVF ordered.       Procedures            LABORATORY TESTS:  Labs Reviewed URINALYSIS W/MICROSCOPIC - Abnormal; Notable for the following:        Result Value    Appearance CLOUDY (*)     Blood TRACE (*)     Leukocyte Esterase SMALL (*)     Epithelial cells MANY (*)     All other components within normal limits   METABOLIC PANEL, COMPREHENSIVE - Abnormal; Notable for the following:     Glucose 122 (*)     Globulin 4.1 (*)     A-G Ratio 1.0 (*)     All other components within normal limits   CBC WITH AUTOMATED DIFF - Abnormal; Notable for the following:     WBC 11.5 (*)     NEUTROPHILS 80 (*)     ABS. NEUTROPHILS 9.1 (*)     All other components within normal limits   URINE CULTURE HOLD SAMPLE   CULTURE, URINE   LIPASE   SAMPLES BEING HELD   HCG URINE, QL. - POC   POC URINE PREGNANCY TEST       IMAGING RESULTS:  Xr Abd Acute W 1 V Chest    Result Date: 2/4/2018  EXAM:  TEMPORARY INDICATION:  Vomiting since last night. Generalized body aches and stomach cramping. COMPARISON: Chest x-ray 9/3/2016, abdominal radiographs 7/17/2016. FINDINGS: The upright chest radiograph demonstrates clear lungs and normal cardiac and mediastinal contours. There is no pleural effusion or free air under the diaphragm. Cardiac and mediastinal contours remain normal. Supine and upright views of the abdomen demonstrate a nonobstructive bowel gas pattern. There is no free intraperitoneal air. 2 right pelvic phleboliths are again incidentally noted without other demonstration of pathologic abdominal pelvic calcification. The bones are within normal limits. IMPRESSION: No acute findings.        MEDICATIONS GIVEN:  Medications   promethazine (PHENERGAN) 12.5 mg in 0.9% sodium chloride 50 mL IVPB (not administered)   sodium chloride 0.9 % bolus infusion 1,000 mL (0 mL IntraVENous IV Completed 2/4/18 1033)   promethazine (PHENERGAN) 25 mg in 0.9% sodium chloride 50 mL IVPB (0 mg IntraVENous IV Completed 2/4/18 1023)   diphenhydrAMINE (BENADRYL) injection 50 mg (50 mg IntraVENous Given 2/4/18 0183)   sodium chloride 0.9 % bolus infusion 1,000 mL (0 mL IntraVENous IV Completed 2/4/18 1240)       IMPRESSION:  1.  Intractable vomiting with nausea, unspecified vomiting type        PLAN:  -    -   Follow-up Information     None          Disposition:  admitted to Hospitalist    Condition:  stable    Total critical care time spent exclusive of procedures:  0 minutes    Shamar Ramirez MD

## 2018-02-04 NOTE — ROUTINE PROCESS
TRANSFER - OUT REPORT:    Verbal report given to LAURA Wahsington (name) on [employer]  being transferred to 5th floor (unit) for routine progression of care       Report consisted of patients Situation, Background, Assessment and   Recommendations(SBAR). Information from the following report(s) SBAR, ED Summary, [facility] and Recent Results was reviewed with the receiving nurse. Lines:   Peripheral IV 02/04/18 Left Antecubital (Active)   Site Assessment Clean, dry, & intact 2/4/2018  7:34 AM   Phlebitis Assessment 0 2/4/2018  7:34 AM   Infiltration Assessment 0 2/4/2018  7:34 AM   Dressing Status Clean, dry, & intact 2/4/2018  7:34 AM   Hub Color/Line Status Pink 2/4/2018  7:34 AM   Action Taken Catheter retaped 2/4/2018  7:34 AM        Opportunity for questions and clarification was provided.       Patient transported with:   [employer]

## 2018-02-05 LAB
ANION GAP SERPL CALC-SCNC: 10 MMOL/L (ref 5–15)
BUN SERPL-MCNC: 10 MG/DL (ref 6–20)
BUN/CREAT SERPL: 15 (ref 12–20)
CALCIUM SERPL-MCNC: 8.1 MG/DL (ref 8.5–10.1)
CHLORIDE SERPL-SCNC: 107 MMOL/L (ref 97–108)
CO2 SERPL-SCNC: 24 MMOL/L (ref 21–32)
CREAT SERPL-MCNC: 0.67 MG/DL (ref 0.55–1.02)
ERYTHROCYTE [DISTWIDTH] IN BLOOD BY AUTOMATED COUNT: 13.4 % (ref 11.5–14.5)
GLUCOSE SERPL-MCNC: 97 MG/DL (ref 65–100)
HCT VFR BLD AUTO: 36.3 % (ref 35–47)
HGB BLD-MCNC: 11.7 G/DL (ref 11.5–16)
MCH RBC QN AUTO: 27.3 PG (ref 26–34)
MCHC RBC AUTO-ENTMCNC: 32.2 G/DL (ref 30–36.5)
MCV RBC AUTO: 84.6 FL (ref 80–99)
NRBC # BLD: 0 K/UL (ref 0–0.01)
NRBC BLD-RTO: 0 PER 100 WBC
PLATELET # BLD AUTO: 182 K/UL (ref 150–400)
PMV BLD AUTO: 10.3 FL (ref 8.9–12.9)
POTASSIUM SERPL-SCNC: 3.6 MMOL/L (ref 3.5–5.1)
RBC # BLD AUTO: 4.29 M/UL (ref 3.8–5.2)
SODIUM SERPL-SCNC: 141 MMOL/L (ref 136–145)
WBC # BLD AUTO: 4.7 K/UL (ref 3.6–11)

## 2018-02-05 PROCEDURE — 96375 TX/PRO/DX INJ NEW DRUG ADDON: CPT

## 2018-02-05 PROCEDURE — 80048 BASIC METABOLIC PNL TOTAL CA: CPT | Performed by: INTERNAL MEDICINE

## 2018-02-05 PROCEDURE — 74011250636 HC RX REV CODE- 250/636: Performed by: INTERNAL MEDICINE

## 2018-02-05 PROCEDURE — 96372 THER/PROPH/DIAG INJ SC/IM: CPT

## 2018-02-05 PROCEDURE — 74011250637 HC RX REV CODE- 250/637: Performed by: INTERNAL MEDICINE

## 2018-02-05 PROCEDURE — 99218 HC RM OBSERVATION: CPT

## 2018-02-05 PROCEDURE — 36415 COLL VENOUS BLD VENIPUNCTURE: CPT | Performed by: INTERNAL MEDICINE

## 2018-02-05 PROCEDURE — 96376 TX/PRO/DX INJ SAME DRUG ADON: CPT

## 2018-02-05 PROCEDURE — 96361 HYDRATE IV INFUSION ADD-ON: CPT

## 2018-02-05 PROCEDURE — 74011000258 HC RX REV CODE- 258: Performed by: INTERNAL MEDICINE

## 2018-02-05 PROCEDURE — 85027 COMPLETE CBC AUTOMATED: CPT | Performed by: INTERNAL MEDICINE

## 2018-02-05 RX ORDER — DEXAMETHASONE SODIUM PHOSPHATE 10 MG/ML
10 INJECTION INTRAMUSCULAR; INTRAVENOUS ONCE
Status: COMPLETED | OUTPATIENT
Start: 2018-02-05 | End: 2018-02-05

## 2018-02-05 RX ORDER — BUPROPION HYDROCHLORIDE 150 MG/1
150 TABLET, EXTENDED RELEASE ORAL DAILY
Status: DISCONTINUED | OUTPATIENT
Start: 2018-02-05 | End: 2018-02-06 | Stop reason: HOSPADM

## 2018-02-05 RX ORDER — LORAZEPAM 0.5 MG/1
0.5 TABLET ORAL
Status: DISCONTINUED | OUTPATIENT
Start: 2018-02-05 | End: 2018-02-06 | Stop reason: HOSPADM

## 2018-02-05 RX ORDER — PROMETHAZINE HYDROCHLORIDE 25 MG/1
25 TABLET ORAL
Status: DISCONTINUED | OUTPATIENT
Start: 2018-02-05 | End: 2018-02-06 | Stop reason: HOSPADM

## 2018-02-05 RX ORDER — CETIRIZINE HCL 10 MG
10 TABLET ORAL DAILY
Status: DISCONTINUED | OUTPATIENT
Start: 2018-02-05 | End: 2018-02-06 | Stop reason: HOSPADM

## 2018-02-05 RX ORDER — SUMATRIPTAN 25 MG/1
25 TABLET, FILM COATED ORAL
Status: COMPLETED | OUTPATIENT
Start: 2018-02-05 | End: 2018-02-05

## 2018-02-05 RX ORDER — IBUPROFEN 400 MG/1
800 TABLET ORAL
Status: DISCONTINUED | OUTPATIENT
Start: 2018-02-05 | End: 2018-02-05

## 2018-02-05 RX ORDER — DIPHENHYDRAMINE HCL 25 MG
50 CAPSULE ORAL
Status: DISCONTINUED | OUTPATIENT
Start: 2018-02-05 | End: 2018-02-06 | Stop reason: HOSPADM

## 2018-02-05 RX ORDER — BUTALBITAL, ACETAMINOPHEN AND CAFFEINE 50; 325; 40 MG/1; MG/1; MG/1
1 TABLET ORAL
Status: DISCONTINUED | OUTPATIENT
Start: 2018-02-05 | End: 2018-02-06 | Stop reason: HOSPADM

## 2018-02-05 RX ORDER — IBUPROFEN 400 MG/1
800 TABLET ORAL
Status: DISCONTINUED | OUTPATIENT
Start: 2018-02-05 | End: 2018-02-06

## 2018-02-05 RX ORDER — IBUPROFEN 400 MG/1
400 TABLET ORAL
Status: COMPLETED | OUTPATIENT
Start: 2018-02-05 | End: 2018-02-05

## 2018-02-05 RX ORDER — PROPRANOLOL HYDROCHLORIDE 10 MG/1
20 TABLET ORAL DAILY
Status: DISCONTINUED | OUTPATIENT
Start: 2018-02-06 | End: 2018-02-06 | Stop reason: HOSPADM

## 2018-02-05 RX ADMIN — DIPHENHYDRAMINE HYDROCHLORIDE 50 MG: 25 CAPSULE ORAL at 11:28

## 2018-02-05 RX ADMIN — SUMATRIPTAN SUCCINATE 25 MG: 25 TABLET ORAL at 04:44

## 2018-02-05 RX ADMIN — ENOXAPARIN SODIUM 40 MG: 40 INJECTION SUBCUTANEOUS at 10:01

## 2018-02-05 RX ADMIN — SUMATRIPTAN SUCCINATE 25 MG: 25 TABLET ORAL at 02:29

## 2018-02-05 RX ADMIN — DEXAMETHASONE SODIUM PHOSPHATE 10 MG: 10 INJECTION, SOLUTION INTRAMUSCULAR; INTRAVENOUS at 11:27

## 2018-02-05 RX ADMIN — IBUPROFEN 800 MG: 400 TABLET, FILM COATED ORAL at 20:19

## 2018-02-05 RX ADMIN — CETIRIZINE HYDROCHLORIDE 10 MG: 10 TABLET, FILM COATED ORAL at 10:01

## 2018-02-05 RX ADMIN — IBUPROFEN 600 MG: 400 TABLET, FILM COATED ORAL at 10:01

## 2018-02-05 RX ADMIN — DIPHENHYDRAMINE HYDROCHLORIDE 50 MG: 25 CAPSULE ORAL at 22:44

## 2018-02-05 RX ADMIN — SODIUM CHLORIDE 75 ML/HR: 9 INJECTION, SOLUTION INTRAVENOUS at 04:45

## 2018-02-05 RX ADMIN — PROMETHAZINE HYDROCHLORIDE 25 MG: 25 INJECTION INTRAMUSCULAR; INTRAVENOUS at 04:44

## 2018-02-05 RX ADMIN — ACETAMINOPHEN 650 MG: 325 TABLET ORAL at 22:44

## 2018-02-05 RX ADMIN — LORAZEPAM 0.5 MG: 0.5 TABLET ORAL at 22:44

## 2018-02-05 RX ADMIN — Medication 10 ML: at 20:20

## 2018-02-05 RX ADMIN — ACETAMINOPHEN 650 MG: 325 TABLET ORAL at 11:27

## 2018-02-05 RX ADMIN — IBUPROFEN 400 MG: 400 TABLET, FILM COATED ORAL at 02:29

## 2018-02-05 RX ADMIN — Medication 10 ML: at 14:00

## 2018-02-05 RX ADMIN — Medication 10 ML: at 04:46

## 2018-02-05 RX ADMIN — PROMETHAZINE HYDROCHLORIDE 25 MG: 25 TABLET ORAL at 11:28

## 2018-02-05 RX ADMIN — PROMETHAZINE HYDROCHLORIDE 25 MG: 25 TABLET ORAL at 19:41

## 2018-02-05 NOTE — PROGRESS NOTES
Pt requested 25 mg phernegan because \"12.5 doesn't help at all. \" Spoke with Dr. Lillian Sauecda, who updated order to 25 mg phenergan q6 prn.

## 2018-02-05 NOTE — PROGRESS NOTES
Pt requested another dose of Imitrex for headache.  MD (Dr. Jim Hare) notified, ok to give another dose of Imitrex 25 mg PO

## 2018-02-05 NOTE — PROGRESS NOTES
[facility]  [address] [facility], [address] [place] [address]  [phone]      Medical Progress Note      NAME: Yunior Hernandez   :  1985  MRM:  [id number]    Date/Time: 2018  8:59 AM       Assessment and Plan:     Principal Problem:  Nausea & vomiting: suspect viral gastroenteritis. UDS appropriately positive for Amphetamine with her Apidex med. Slowly ADAT, continue anti-emetics and wean if able. Continue IVF and supportive care.     Active Problems:  Migraine: s/p imitrex, reports prior response to ibuprofen, will add PRN. Can try dexamethasone as dual anti-emetic/headache     Gastroenteritis: likely viral. Abdominal exam benign, no indication for imaging at this time. Obesity. On apidex so she is actively working on weight loss. Encouraged her to continue this. Subjective:     Chief Complaint:  Patient seen and examined. Chart reviewed. Nausea and vomiting improving but now with migraine, she would like to try advancing her diet. ROS:  (bold if positive, if negative)      Tolerating PT  Tolerating Diet        Objective:     Last 24hrs VS reviewed since prior progress note.  Most recent are:    Visit Vitals    /64 (BP 1 Location: Right arm, BP Patient Position: At rest)    Pulse 77    Temp 98.3 °F (36.8 °C)    Resp 16    Ht 5' 5\" (1.651 m)    Wt 95.3 kg (210 lb)    SpO2 97%    BMI 34.95 kg/m2     SpO2 Readings from Last 6 Encounters:   18 97%   17 99%   17 97%   01/10/17 95%   16 95%   16 97%          Intake/Output Summary (Last 24 hours) at 18 0859  Last data filed at 18 1959   Gross per 24 hour   Intake                0 ml   Output                1 ml   Net               -1 ml        Physical Exam:    Gen:  Well-developed, well-nourished, in no acute distress  HEENT:  Pink conjunctivae, PERRL, hearing intact to voice, moist mucous membranes  Neck:  Supple, without masses, thyroid non-tender  Resp:  No accessory muscle use, clear breath sounds without wheezes rales or rhonchi  Card:  No murmurs, normal S1, S2 without thrills, bruits or peripheral edema  Abd:  Soft, mildly ttp, non-distended, normoactive bowel sounds are present, no palpable organomegaly and no detectable hernias  Lymph:  No cervical or inguinal adenopathy  Musc:  No cyanosis or clubbing  Skin:  No rashes or ulcers, skin turgor is good  Neuro:  Cranial nerves are grossly intact, no focal motor weakness, follows commands appropriately  Psych:  Good insight, oriented to person, place and time, alert    __________________________________________________________________  Medications Reviewed: (see below)  Medications:     Current Facility-Administered Medications   Medication Dose Route Frequency    ibuprofen (MOTRIN) tablet 600 mg  600 mg Oral Q6H PRN    cetirizine (ZYRTEC) tablet 10 mg  10 mg Oral DAILY    sodium chloride (NS) flush 5-10 mL  5-10 mL IntraVENous Q8H    sodium chloride (NS) flush 5-10 mL  5-10 mL IntraVENous PRN    acetaminophen (TYLENOL) tablet 650 mg  650 mg Oral Q4H PRN    enoxaparin (LOVENOX) injection 40 mg  40 mg SubCUTAneous DAILY    0.9% sodium chloride infusion  75 mL/hr IntraVENous CONTINUOUS    influenza vaccine 2017-18 (3 yrs+)(PF) (FLUZONE QUAD/FLUARIX QUAD) injection 0.5 mL  0.5 mL IntraMUSCular PRIOR TO DISCHARGE    promethazine (PHENERGAN) 25 mg in 0.9% sodium chloride 50 mL IVPB  25 mg IntraVENous Q6H PRN        Lab Data Reviewed: (see below)  Lab Review:     Recent Labs      02/05/18   0422  02/04/18   0737   WBC  4.7  11.5*   HGB  11.7  14.3   HCT  36.3  43.5   PLT  182  255     Recent Labs      02/05/18   0422  02/04/18   0737   NA  141  141   K  3.6  4.2   CL  107  105   CO2  24  24   GLU  97  122*   BUN  10  15   CREA  0.67  0.96   CA  8.1*  9.3   ALB   --   3.9   TBILI   --   0.5   SGOT   --   29   ALT   --   26     Lab Results   Component Value Date/Time    Glucose (POC) 115 12/24/2015 10:00 AM Glucose (POC) 102 08/25/2015 10:32 PM     No results for input(s): PH, PCO2, PO2, HCO3, FIO2 in the last 72 hours. No results for input(s): INR in the last 72 hours. No lab exists for component: INREXT  All Micro Results     Procedure Component Value Units Date/Time    CULTURE, URINE [[id number]] Collected:  02/04/18 8995    Order Status:  Completed Specimen:  Urine from Clean catch Updated:  02/04/18 1853    CULTURE, URINE [[id number]]     Order Status:  Canceled Specimen:  Urine from [address] [place] [address] [[id number]] Collected:  02/04/18 6841    Order Status:  Completed Specimen:  Urine from Serum Updated:  02/04/18 0817     Urine culture hold         URINE ON HOLD IN MICROBIOLOGY DEPT FOR 3 DAYS. IF UNPRESERVED URINE IS SUBMITTED, IT CANNOT BE USED FOR ADDITIONAL TESTING AFTER 24 HRS, RECOLLECTION WILL BE REQUIRED. I have reviewed notes of prior 24hr.     Other pertinent lab: None    Total time spent with patient: 35 min                  Care Plan discussed with: Patient, Family, Care Manager and Nursing Staff    Discussed:  Care Plan and D/C Planning    Prophylaxis:  Lovenox    Disposition:  Home w/Family           ___________________________________________________    Attending Physician: Cynthia Tijerina,

## 2018-02-05 NOTE — PROGRESS NOTES
2-5-2018 CASE MANAGEMENT NOTE:  I met with the pt to determine potential discharge needs. She lives with her mother, Shadia [place] ([phone]), and 9year old disabled son in a one level house with an entry ramp. She is independent with her ADL's, uses no assistive devices and drives. Her former PCP, Dr. Guerita Zarco, left [facility] so she sees the other MD's in the practice. She has prescription drug coverage and gets her medications from [facility] on [address]. I explained Observation status to her, she signed the [place] letter, was given a copy and the original was placed on her chart. She is not anticipating any discharge problems and her boyfriend will transport her home. Care Management Interventions  PCP Verified by CM:  Yes ([facility])  Discharge Durable Medical Equipment: No  Physical Therapy Consult: No  Occupational Therapy Consult: No  Speech Therapy Consult: No  Current Support Network:  (Lives with mother and 9year old son)  Confirm Follow Up Transport: Friends  Plan discussed with Pt/Family/Caregiver: Yes  Discharge Location  Discharge Placement:  (Home)    DEANA Cornell, CM

## 2018-02-05 NOTE — PROGRESS NOTES
Bedside and Verbal shift change report given to [address] Jagjit RN (oncoming nurse) by Nba Thayer RN (offgoing nurse). Report included the following information SBAR, Kardex, MAR, Accordion and Recent Results.

## 2018-02-05 NOTE — PROGRESS NOTES
Bedside and Verbal shift change report given to Ei Ei-RN (oncoming nurse) by Yosi Perkins (offgoing nurse). Report included the following information SBAR, Kardex, ED Summary, Intake/Output, MAR and Recent Results. Nathaniel Ruiz

## 2018-02-05 NOTE — PROGRESS NOTES
Pt c/o repeated bouts of vomiting, 13-14 times since 3pm. Witnessed several occasions personally. Called Dr. Milton Heredia, who stated to order a one-time dose of 12.5 mg IV phernegan now (7pm), and change current prn order to 25 mg phernegan q6 prn.

## 2018-02-05 NOTE — PROGRESS NOTES
Pt c/o headache. Requested Motrin and Imitrex got headache. MD (Angeles Gamble) notified. New order received. Pt also requested to change her diet to regular. States \" I need solid foods. I'm getting headache because I'm not having anything to eat. \" Dr. Mari Echeverria doesn't want her to eat heavy meals/foods at this time. He is ok with her eating some crackers to take medicine. Alert and oriented to person, place and time

## 2018-02-05 NOTE — PROGRESS NOTES
Primary Nurse Richard Hayes, LAURA and Estee Franco RN performed a dual skin assessment on this patient No impairment noted  Jarod score is 23

## 2018-02-06 ENCOUNTER — HOSPITAL ENCOUNTER (EMERGENCY)
Age: 33
Discharge: HOME | End: 2018-02-06
Attending: EMERGENCY MEDICINE
Payer: MEDICAID

## 2018-02-06 VITALS
HEART RATE: 79 BPM | TEMPERATURE: 98.7 F | HEIGHT: 65 IN | RESPIRATION RATE: 16 BRPM | BODY MASS INDEX: 34.99 KG/M2 | DIASTOLIC BLOOD PRESSURE: 69 MMHG | OXYGEN SATURATION: 95 % | SYSTOLIC BLOOD PRESSURE: 108 MMHG | WEIGHT: 210 LBS

## 2018-02-06 VITALS
OXYGEN SATURATION: 98 % | TEMPERATURE: 98.3 F | DIASTOLIC BLOOD PRESSURE: 77 MMHG | WEIGHT: 215.5 LBS | HEIGHT: 65 IN | SYSTOLIC BLOOD PRESSURE: 133 MMHG | BODY MASS INDEX: 35.9 KG/M2 | RESPIRATION RATE: 16 BRPM | HEART RATE: 79 BPM

## 2018-02-06 DIAGNOSIS — G43.809 OTHER MIGRAINE WITHOUT STATUS MIGRAINOSUS, NOT INTRACTABLE: Primary | ICD-10-CM

## 2018-02-06 LAB
BACTERIA SPEC CULT: NORMAL
CC UR VC: NORMAL
SERVICE CMNT-IMP: NORMAL

## 2018-02-06 PROCEDURE — 74011250636 HC RX REV CODE- 250/636: Performed by: EMERGENCY MEDICINE

## 2018-02-06 PROCEDURE — 96375 TX/PRO/DX INJ NEW DRUG ADDON: CPT

## 2018-02-06 PROCEDURE — 96365 THER/PROPH/DIAG IV INF INIT: CPT

## 2018-02-06 PROCEDURE — 99282 EMERGENCY DEPT VISIT SF MDM: CPT

## 2018-02-06 PROCEDURE — 99218 HC RM OBSERVATION: CPT

## 2018-02-06 RX ORDER — DIPHENHYDRAMINE HYDROCHLORIDE 50 MG/ML
50 INJECTION, SOLUTION INTRAMUSCULAR; INTRAVENOUS
Status: COMPLETED | OUTPATIENT
Start: 2018-02-06 | End: 2018-02-06

## 2018-02-06 RX ORDER — KETOROLAC TROMETHAMINE 30 MG/ML
30 INJECTION, SOLUTION INTRAMUSCULAR; INTRAVENOUS
Status: DISCONTINUED | OUTPATIENT
Start: 2018-02-06 | End: 2018-02-06 | Stop reason: HOSPADM

## 2018-02-06 RX ORDER — KETOROLAC TROMETHAMINE 30 MG/ML
30 INJECTION, SOLUTION INTRAMUSCULAR; INTRAVENOUS
Status: COMPLETED | OUTPATIENT
Start: 2018-02-06 | End: 2018-02-06

## 2018-02-06 RX ADMIN — KETOROLAC TROMETHAMINE 30 MG: 30 INJECTION, SOLUTION INTRAMUSCULAR at 03:49

## 2018-02-06 RX ADMIN — PROMETHAZINE HYDROCHLORIDE 12.5 MG: 25 INJECTION INTRAMUSCULAR; INTRAVENOUS at 03:47

## 2018-02-06 RX ADMIN — SODIUM CHLORIDE 1000 ML: 900 INJECTION, SOLUTION INTRAVENOUS at 03:47

## 2018-02-06 RX ADMIN — DIPHENHYDRAMINE HYDROCHLORIDE 50 MG: 50 INJECTION, SOLUTION INTRAMUSCULAR; INTRAVENOUS at 03:47

## 2018-02-06 NOTE — PROGRESS NOTES
Patient and family requesting charge nurse or nursing supervisor to come in to speak to them. When entering the room patient was on the phone with her mother and requested I speak to her mother. Her mother explained her daughter's history with migraines and the medications she has tried. Patient and family expressed their frustration with the medications ordered and I stated I would contact the MD on call. I spoke with Dr. Louise Dunham explaining how the patient would like to speak to him for a better pain management plan. Dr. Louise Dunham stated he was busy in the ED with more critical patients and could not leave them at the moment and asked for us to administered her PRNs (tylenol, benadryl and ativan). I went back into the patient's room to explain to her that the doctor would like for her to try her medications she already had ordered and to go from there. Patient agreed and medications were administered.

## 2018-02-06 NOTE — PROGRESS NOTES
Bedside and Verbal shift change report given to Luz Marina Drummond RN (oncoming nurse) by Renae Rothman RN (offgoing nurse). Report included the following information SBAR, Kardex, Intake/Output and MAR.     2355- Obtained report on patient and was notified from my charge nurse that patient was requesting pain medication for a headache. After reviewing the chart, this RN saw that Dr. Jennifer Castanon placed a new order for prn Fiorecet. This RN entered patient room to find her talking on her cell phone. This RN introduced herself and began initial assessment of pain. Patient reported HA 11/10 pain, throbbing anteriorly. This RN notified the patient of the medication that was available to her and the patient stated \"that doesn't work, I want something for pain, like Toradol. I want Toradol\". Patient asked if patient would try the ordered Fiorecet that the on-call MD ordered but patient refused. Patient then asked for this RN to speak to her mother on the phone. This RN picked up the phone and introduced self. Patient's mom began talking in a raised voice, demanding that I call the doctor now and threatening that she would leave AMA if I did not. Patient's mom then stated, \"No actually, my daughter is going to leave and go to another hospital and you will print out all medications that she has taken right now! \". This RN notified patient and patient's mother that I am not legally allowed to print out that list but I can give her the form to have the information released to her. Patient's mother began to speak in a threatening tone and this RN asked that she please calm down and I did not feel comfortable talking to her. This RN left the room and called security, Nursing Supervisor, and Dr. Jennifer Castanon. Dr. Jennifer Castanon agreed to order IV Toradol for this patient and when this RN entered room to administer it, the patient refused and said she was leaving.  With security and nursing supervisor bedside this RN explained the process of leaving AMA and patient refused to \"sign anything\". #20 IV removed from Left AC. Patient left unit with belongings at [facility]. Dr. Ko Bledsoe notified of above.

## 2018-02-06 NOTE — ED PROVIDER NOTES
Patient is a 27 y.o. female presenting with migraines. Migraine    Pertinent negatives include no fever, no shortness of breath, no nausea and no vomiting. 27year old female with gerd, lupus, migraines, presents from [facility] after leaving AMA. SHe was admitted for acute gastroenteritis- son had same. Didn't go to ED with headache. Developed her typical migraine while in house. States they weren't listening to her and giving her the right medications. Usually in the ED she gets toradol/benadryl/phenergan and steroids. She states the stomach symptoms- n/v/d has resolved. Nothing different about this migraine. No fevers. Past Medical History:   Diagnosis Date    Bilateral ovarian cysts     GERD (gastroesophageal reflux disease)     Lupus     Migraine     Neurological disorder     migraines     Other ill-defined conditions(799.89)     lupus    Psychiatric disorder        Past Surgical History:   Procedure Laterality Date    COLONOSCOPY N/A 3/14/2017    COLONOSCOPY performed by Anna Shelton [facility] at [facility] ENDOSCOPY    COLONOSCOPY,DIAGNOSTIC  3/14/2017         HX GI      LAPAROSCOPY    HX HEENT  4/4/16    sinus surgery    HX HERNIA REPAIR      BILATERAL INGUINAL HERNIA REPAIR    HX OTHER SURGICAL      hemmorhoid, hernia-groin         Family History:   Problem Relation Age of Onset    Anesth Problems Neg Hx        Social History     Social History    Marital status: SINGLE     Spouse name: N/A    Number of children: N/A    Years of education: N/A     Occupational History    Not on file. Social History Main Topics    Smoking status: Former Smoker     Years: 1.00     Quit date: 3/31/2014    Smokeless tobacco: Never Used    Alcohol use No    Drug use: No    Sexual activity: Not on file     Other Topics Concern    Not on file     Social History Narrative         ALLERGIES: Reglan [metoclopramide]; Percocet [oxycodone-acetaminophen]; Phenergan [promethazine];  Zofran [ondansetron hcl (pf)]; and Compazine [prochlorperazine edisylate]    Review of Systems   Constitutional: Negative for fever. HENT: Negative for congestion. Eyes: Negative for visual disturbance. Respiratory: Negative for cough and shortness of breath. Cardiovascular: Negative for chest pain. Gastrointestinal: Negative for abdominal pain, nausea and vomiting. Endocrine: Negative for polyuria. Genitourinary: Negative for dysuria. Musculoskeletal: Negative for gait problem. Neurological: Positive for headaches. Psychiatric/Behavioral: Negative for dysphoric mood. Vitals:    02/06/18 0133   BP: (!) 138/91   Pulse: 80   Resp: 16   Temp: 98.5 °F (36.9 °C)   SpO2: 97%   Weight: 97.8 kg (215 lb 8 oz)   Height: 5' 5\" (1.651 m)            Physical Exam   Constitutional: She is oriented to person, place, and time. She appears well-developed and well-nourished. No distress. HENT:   Head: Normocephalic and atraumatic. Mouth/Throat: Oropharynx is clear and moist. No oropharyngeal exudate. Eyes: Conjunctivae and EOM are normal. Pupils are equal, round, and reactive to light. Right eye exhibits no discharge. Left eye exhibits no discharge. No scleral icterus. Neck: Normal range of motion. Neck supple. No JVD present. Cardiovascular: Normal rate, regular rhythm, normal heart sounds and intact distal pulses. Exam reveals no gallop and no friction rub. No murmur heard. Pulmonary/Chest: Effort normal and breath sounds normal. No stridor. No respiratory distress. She has no wheezes. She has no rales. She exhibits no tenderness. Abdominal: Soft. Bowel sounds are normal. She exhibits no distension and no mass. There is no tenderness. There is no rebound and no guarding. Musculoskeletal: Normal range of motion. She exhibits no edema or tenderness. Neurological: She is alert and oriented to person, place, and time. She has normal reflexes. No cranial nerve deficit. She exhibits normal muscle tone.  Coordination normal. Skin: Skin is warm and dry. No rash noted. No erythema. Psychiatric: She has a normal mood and affect. Her behavior is normal. Judgment and thought content normal.        MDM      ED Course       Procedures         Migraine cocktail- toradol/phenergan/benadryl/solumedrol/fluids and reassess. Headache is down to 4/10 and she feels she can go home. Will follow up with her neurologist. Will return if symptoms worsen.     Iv taken out by me at 5:00am

## 2018-02-06 NOTE — PROGRESS NOTES
Patient called out to nurse's station requesting her nurse. Her nurse was receiving report on another patient at the time so I entered the room to see if there was anything I could help with. The patient stated \"my head feels like its pounding out of my head. \" I stated I would let her nurse know and see if there was anything else we could give her. Dr. Gerber Monge had placed a new order for Fioricet in and her new nurse for the remainder of the shift stated she would administer it.

## 2018-02-06 NOTE — DISCHARGE INSTRUCTIONS
Recurring Migraine Headache: Care Instructions  Your Care Instructions  Migraines are painful, throbbing headaches. They often start on one side of the head. They may cause nausea and vomiting and make you sensitive to light, sound, or smell. Some people may have only a few migraines throughout life. Others have them as often as several times a month. The goal of treatment is to reduce the number of migraines you have and relieve your symptoms. Even with treatment, you may continue to have migraines. You play an important role in dealing with your headaches. Work on avoiding things that seem to trigger your migraines. When you feel a headache coming on, act quickly to stop it before it gets worse. Follow-up care is a key part of your treatment and safety. Be sure to make and go to all appointments, and call your doctor if you are having problems. It's also a good idea to know your test results and keep a list of the medicines you take. How can you care for yourself at home? · Do not drive if you have taken a prescription pain medicine. · Rest in a quiet, dark room until your headache is gone. Close your eyes and try to relax or go to sleep. Do not watch TV or read. · Put a cold, moist cloth or cold pack on the painful area for 10 to 20 minutes at a time. Put a thin cloth between the cold pack and your skin. · Have someone gently massage your neck and shoulders. · Take your medicines exactly as prescribed. Call your doctor if you think you are having a problem with your medicine. You will get more details on the specific medicines your doctor prescribes. To prevent migraines  · Keep a headache diary so you can figure out what triggers your headaches. Avoiding triggers may help you prevent headaches. Record when each headache began, how long it lasted, and what the pain was like. Use words like throbbing, aching, stabbing, or dull. Write down any other symptoms you had with the headache.  These may include nausea, flashing lights or dark spots, or sensitivity to bright light or loud noise. Note if the headache occurred near your period. List anything that might have triggered the headache. Triggers may include certain foods (chocolate, cheese, wine) or odors, smoke, bright light, stress, or lack of sleep. · If your doctor has prescribed medicine for your migraines, take it as directed. You may have medicine that you take only when you get a migraine and medicine that you take all the time to help prevent migraines. ¨ If your doctor has prescribed medicine for when you get a headache, take it at the first sign of a migraine, unless your doctor has given you other instructions. ¨ If your doctor has prescribed medicine to prevent migraines, take it exactly as prescribed. Call your doctor if you think you are having a problem with your medicine. · Find healthy ways to deal with stress. Migraines are most common during or right after stressful times. Take time to relax before and after you do something that has caused a migraine in the past.  · Try to keep your muscles relaxed by keeping good posture. Check your jaw, face, neck, and shoulder muscles for tension. Try to relax them. When sitting at a desk, change positions often. Stretch for 30 seconds each hour. · Get regular sleep and exercise. · Eat regular meals, and avoid foods and drinks that often trigger migraines. These include chocolate and alcohol, especially red wine and port. Chemicals used in food, such as aspartame and monosodium glutamate (MSG), also can trigger migraines. So can some food additives, such as those found in hot dogs, nichols, cold cuts, aged cheeses, and pickled foods. · Limit caffeine by not drinking too much coffee, tea, or soda. Do not quit caffeine suddenly, because that can also give you migraines. · Do not smoke or allow others to smoke around you.  If you need help quitting, talk to your doctor about stop-smoking programs and medicines. These can increase your chances of quitting for good. · If you are taking birth control pills or hormone therapy, talk to your doctor about whether they are triggering your migraines. When should you call for help? Call 911 anytime you think you may need emergency care. For example, call if:  ? · You have symptoms of a stroke. These may include:  ¨ Sudden numbness, tingling, weakness, or loss of movement in your face, arm, or leg, especially on only one side of your body. ¨ Sudden vision changes. ¨ Sudden trouble speaking. ¨ Sudden confusion or trouble understanding simple statements. ¨ Sudden problems with walking or balance. ¨ A sudden, severe headache that is different from past headaches. ?Call your doctor now or seek immediate medical care if:  ? · You develop a fever and a stiff neck. ? · You have new nausea and vomiting, or you cannot keep down food or liquids. ? Watch closely for changes in your health, and be sure to contact your doctor if:  ? · You have a headache that does not get better within 1 or 2 days. ? · Your headaches get worse or happen more often. Where can you learn more? Go to http://jose-jacek.info/. Enter V975 in the search box to learn more about \"Recurring Migraine Headache: Care Instructions. \"  Current as of: October 14, 2016  Content Version: 11.4  © 4760-3069 [employer]. Care instructions adapted under license by [employer] (which disclaims liability or warranty for this information). If you have questions about a medical condition or this instruction, always ask your healthcare professional. [place] [address] any warranty or liability for your use of this information.

## 2018-02-06 NOTE — PROGRESS NOTES
Paged by nursing re: dose of Motrin, as pt uses 800mg PRN at home. Advised that no real benefit increasing 600mg to 800mg, but does increase risk of peptic ulcer. Pt insists. Changed order to 800mg Q8H PRN. Asked to speak to on-call physician. Unfortunately I am unable to speak to Ms. Driscoll about her chronic migraines at the moment as I have been asked to see multiple patients from the ED (DKA, sepsis, sepsis, GI bleed, septic emboli, TIA/possible CVA), all of whom need more urgent attention.    --------------  Patient now asking for Toradol. Will stop Motrin.    --------------  Paged again by nursing as pt is now requesting opioids. Asked by day-time attending to not give narcotics on sign-out. Pt threatening to leave AMA. Nursing to explain potential risks of leaving and complete AMA paper work.     Curry Russell MD

## 2018-02-06 NOTE — ED TRIAGE NOTES
I was admitted to [facility] for vomiting. I was just discharged from there about an hour ago. I had a migraine and the medication they gave me wasn't right. I started with the migraine at 1200.

## 2018-02-06 NOTE — PROGRESS NOTES
1955: Pt requested Phenergan and Motrin during shift change bedside report. Took medication to the room and told her what I'm giving it to her. Pt insisted of calling MD and demending to increase Motrin to 800 mg. She stated that 600 mg will not do anything. Dr. Louise Dunham explained about risk of of taking peptic ulcer and he increased to 800 mg PO Q 8 hrs. 2019: Motrin given per MD order and explained regarding the risk of taking high dose Motrin. 2119: The headache did not relieved. Requested ice pack to help relieved headache. Ice pack given per pt request.     4136: Pt c/o unrelieved headache. Motrin did not help. Pt requested more medication. Asked what other medications available to take. This nurse told her that in the PRN list she can get Tylenol, Ativan and Benadryl. She requested to give her all 3 medications. 2240: This nurse checked with the pt and see she normally take all the medications ( Benadryl, Tylenol, and Ativan) at the same time at home? She asked why? This nurse explained to her that \" the body react differently to people so I just want to make sure your body can handle all these medications at the same time. \" Pt asked this nurse \" Are you telling me that I shouldn't be taking these medications? \" This nurse replied that \" I don't know how your body will react, that's why I'm double checking with you to make sure. \" She stated that \" Oh, so you don't even know what's going to my body if I take these medications. You are not a competent nurse. You don't even know what is going to my body. I need to talk to your manager and I want you to call the doctor right now. I need to see the doctor. \" Charged nurse notified. Charged nurse spoke with Dr. Louise Dunham.

## 2018-06-22 LAB
HBSAG, EXTERNAL: NEGATIVE
HIV, EXTERNAL: NON REACTIVE
RPR, EXTERNAL: NON REACTIVE
RUBELLA, EXTERNAL: NORMAL
TYPE, ABO & RH, EXTERNAL: NORMAL

## 2018-08-21 ENCOUNTER — HOSPITAL ENCOUNTER (EMERGENCY)
Age: 33
Discharge: HOME | End: 2018-08-21
Attending: EMERGENCY MEDICINE | Admitting: EMERGENCY MEDICINE
Payer: MEDICAID

## 2018-08-21 ENCOUNTER — APPOINTMENT (OUTPATIENT)
Dept: ULTRASOUND IMAGING | Age: 33
End: 2018-08-21
Attending: EMERGENCY MEDICINE
Payer: MEDICAID

## 2018-08-21 VITALS
HEIGHT: 65 IN | HEART RATE: 92 BPM | RESPIRATION RATE: 16 BRPM | TEMPERATURE: 98.7 F | WEIGHT: 217.59 LBS | BODY MASS INDEX: 36.25 KG/M2 | DIASTOLIC BLOOD PRESSURE: 83 MMHG | OXYGEN SATURATION: 98 % | SYSTOLIC BLOOD PRESSURE: 118 MMHG

## 2018-08-21 DIAGNOSIS — Z71.1 CONCERN ABOUT CURRENT PREGNANCY WITHOUT DIAGNOSIS: Primary | ICD-10-CM

## 2018-08-21 PROCEDURE — 99283 EMERGENCY DEPT VISIT LOW MDM: CPT

## 2018-08-21 PROCEDURE — 76815 OB US LIMITED FETUS(S): CPT

## 2018-08-21 NOTE — DISCHARGE INSTRUCTIONS
Learning About Pregnancy  Your Care Instructions    Your health in the early weeks of your pregnancy is particularly important for your baby's health. Take good care of yourself. Anything you do that harms your body can also harm your baby. Make sure to go to all of your doctor appointments. Regular checkups will help keep you and your baby healthy. How can you care for yourself at home? Diet    · Eat a balanced diet. Make sure your diet includes plenty of beans, peas, and leafy green vegetables.     · Do not skip meals or go for many hours without eating. If you are nauseated, try to eat a small, healthy snack every 2 to 3 hours.     · Do not eat fish that has a high level of mercury, such as shark, swordfish, or mackerel. Do not eat more than one can of tuna each week.     · Drink plenty of fluids, enough so that your urine is light yellow or clear like water. If you have kidney, heart, or liver disease and have to limit fluids, talk with your doctor before you increase the amount of fluids you drink.     · Cut down on caffeine, such as coffee, tea, and cola.     · Do not drink alcohol, such as beer, wine, or hard liquor.     · Take a multivitamin that contains at least 400 micrograms (mcg) of folic acid to help prevent birth defects. Fortified cereal and whole wheat bread are good additional sources of folic acid.     · Increase the calcium in your diet. Try to drink a quart of skim milk each day. You may also take calcium supplements and choose foods such as cheese and yogurt.    Lifestyle    · Make sure you go to your follow-up appointments.     · Get plenty of rest. You may be unusually tired while you are pregnant.     · Get at least 30 minutes of exercise on most days of the week. Walking is a good choice. If you have not exercised in the past, start out slowly. Take several short walks each day.     · Do not smoke. If you need help quitting, talk to your doctor about stop-smoking programs.  These can increase your chances of quitting for good.     · Do not touch cat feces or litter boxes. Also, wash your hands after you handle raw meat, and fully cook all meat before you eat it. Wear gloves when you work in the yard or garden, and wash your hands well when you are done. Cat feces, raw or undercooked meat, and contaminated dirt can cause an infection that may harm your baby or lead to a miscarriage.     · Do not use saunas or hot tubs. Raising your body temperature may harm your baby.     · Avoid chemical fumes, paint fumes, or poisons.     · Do not use illegal drugs or alcohol. Medicines    · Review all of your medicines with your doctor. Some of your routine medicines may need to be changed to protect your baby.     · Use acetaminophen (Tylenol) to relieve minor problems, such as a mild headache or backache or a mild fever with cold symptoms. Do not use nonsteroidal anti-inflammatory drugs (NSAIDs), such as ibuprofen (Advil, Motrin) or naproxen (Aleve), unless your doctor says it is okay.     · Do not take two or more pain medicines at the same time unless the doctor told you to. Many pain medicines have acetaminophen, which is Tylenol. Too much acetaminophen (Tylenol) can be harmful.     · Take your medicines exactly as prescribed. Call your doctor if you think you are having a problem with your medicine.    To manage morning sickness    · If you feel sick when you first wake up, try eating a small snack (such as crackers) before you get out of bed. Allow some time to digest the snack, and then get out of bed slowly.     · Do not skip meals or go for long periods without eating. An empty stomach can make nausea worse.     · Eat small, frequent meals instead of three large meals each day.     · Drink plenty of fluids.  Sports drinks, such as Gatorade or Powerade, are good choices.     · Eat foods that are high in protein but low in fat.     · If you are taking iron supplements, ask your doctor if they are necessary. Iron can make nausea worse.     · Avoid any smells, such as coffee, that make you feel sick.     · Get lots of rest. Morning sickness may be worse when you are tired. Follow-up care is a key part of your treatment and safety. Be sure to make and go to all appointments, and call your doctor if you are having problems. It's also a good idea to know your test results and keep a list of the medicines you take. Where can you learn more? Go to http://jose-jacek.info/. Enter Z393 in the search box to learn more about \"Learning About Pregnancy. \"  Current as of: November 21, 2017  Content Version: 11.7  © 3408-1131 [employer]. Care instructions adapted under license by [employer] (which disclaims liability or warranty for this information). If you have questions about a medical condition or this instruction, always ask your healthcare professional. [address] any warranty or liability for your use of this information.

## 2018-08-21 NOTE — ED TRIAGE NOTES
Pt is 14 weeks pregnant. Reports feeling a sudden onset of the baby kicking \"like it was having a seizure,\" yesterday. Unsure whether or not she has felt the baby move since the episode. Denies vaginal bleeding, abdominal pain or cramping.  Sarai Cons

## 2018-08-21 NOTE — ED NOTES
PA reviewed discharge instructions and options with patient and patient verbalized understanding. RN reviewed discharge instructions using teachback method. Pt ambulated to exit without difficulty and in no signs of acute distress escorted by mother who will drive home. No complaints or needs expressed at this time. VSS at time of discharge. Pt to call PCP in the morning for follow up.

## 2018-08-21 NOTE — ED PROVIDER NOTES
HPI Comments: 29-year-old female presents emergency room for evaluation of her pregnancy. Patient states 2 days ago she felt a period of extreme motion of her pain. Reports yesterday she has felt decreased movement. She denies fever or chills. No nausea or vomiting. No chest pain, shortness of breath difficulty breathing. No abdominal cramping or vaginal bleeding. No dysuria frequency or urgency. No headache. Patient states she did not feel this much movement initially with her first pregnancy. The decreased fetal activity after the extra movement concerned her so she came to the emergency room. She did not contact her OB/GYN. Patient is . Patient is currently 14 weeks pregnant. Social hx  Hx of smoking  No alcohol. Patient is a 27 y.o. female presenting with pregnancy problem. The history is provided by the patient. Pregnancy Problem    Pertinent negatives include no fever, no diarrhea, no nausea, no vomiting, no dysuria, no frequency, no headaches, no myalgias, no chest pain and no back pain. Past Medical History:   Diagnosis Date    Bilateral ovarian cysts     GERD (gastroesophageal reflux disease)     Lupus     Migraine     Neurological disorder     migraines     Other ill-defined conditions(799.89)     lupus    Psychiatric disorder        Past Surgical History:   Procedure Laterality Date    COLONOSCOPY N/A 3/14/2017    COLON[facility]COPY performed by Jenifer Valencia [facility] at [facility] ENDOSCOPY    COLONOSCOPY,DIAGNOSTIC  3/14/2017         HX GI      LAPAROSCOPY    HX HEENT  16    sinus surgery    HX HERNIA REPAIR      BILATERAL INGUINAL HERNIA REPAIR    HX OTHER SURGICAL      hemmorhoid, hernia-groin         Family History:   Problem Relation Age of Onset    Anesth Problems Neg Hx        Social History     Social History    Marital status: SINGLE     Spouse name: N/A    Number of children: N/A    Years of education: N/A     Occupational History    Not on file.      Social History Main Topics    Smoking status: Former Smoker     Years: 1.00     Quit date: 3/31/2014    Smokeless tobacco: Never Used    Alcohol use No    Drug use: No    Sexual activity: Not on file     Other Topics Concern    Not on file     Social History Narrative         ALLERGIES: Reglan [metoclopramide]; Percocet [oxycodone-acetaminophen]; Phenergan [promethazine]; Zofran [ondansetron hcl (pf)]; and Compazine [prochlorperazine edisylate]    Review of Systems   Constitutional: Negative for chills and fever. Respiratory: Negative for cough and shortness of breath. Cardiovascular: Negative for chest pain and palpitations. Gastrointestinal: Negative for abdominal pain, diarrhea, nausea and vomiting. Genitourinary: Negative for dysuria, flank pain, frequency and urgency. Musculoskeletal: Negative for back pain, myalgias, neck pain and neck stiffness. Skin: Negative for rash and wound. Neurological: Negative for dizziness, numbness and headaches. All other systems reviewed and are negative. Vitals:    08/21/18 1405   BP: (!) 132/91   Pulse: 83   Resp: 16   Temp: 97.9 °F (36.6 °C)   SpO2: 99%   Weight: 98.7 kg (217 lb 9.5 oz)   Height: 5' 5\" (1.651 m)            Physical Exam   Constitutional: She is oriented to person, place, and time. She appears well-developed and well-nourished. No distress. HENT:   Head: Normocephalic and atraumatic. Right Ear: External ear normal.   Left Ear: External ear normal.   Eyes: Conjunctivae and EOM are normal. Pupils are equal, round, and reactive to light. Neck: Normal range of motion. Neck supple. Cardiovascular: Normal rate and regular rhythm. Pulmonary/Chest: Effort normal and breath sounds normal. No respiratory distress. She has no wheezes. Abdominal: Soft. Normal appearance and bowel sounds are normal. She exhibits no shifting dullness, no distension, no pulsatile liver, no abdominal bruit, no pulsatile midline mass and no mass.  There is no hepatosplenomegaly, splenomegaly or hepatomegaly. There is no tenderness. There is no rigidity, no rebound, no guarding, no CVA tenderness, no tenderness at McBurney's point and negative Venegas's sign. Abdomen exposed for exam.  Soft, gravid, nontender   Musculoskeletal: Normal range of motion. She exhibits no edema or tenderness. Neurological: She is alert and oriented to person, place, and time. She has normal reflexes. No cranial nerve deficit. Coordination normal.   Skin: Skin is warm and dry. No rash noted. No erythema. Psychiatric: She has a normal mood and affect. Her behavior is normal. Judgment and thought content normal.   Nursing note and vitals reviewed. MDM  Number of Diagnoses or Management Options  Concern about current pregnancy without diagnosis:   Diagnosis management comments: 26-year-old female presenting for evaluation of her pregnancy. Her abdomen is gravid soft and nontender. She is nontoxic-appearing. Lungs clear bilaterally. No pedal or extremity edema. Plan: Ultrasound     4:24 PM  Ultrasound shows a fetal heart rate of 165. No obvious abnormalities reported. No other acute problems noted on ultrasound. Patient is well hydrated, well appearing, and in no respiratory distress. Physical exam is reassuring, and without signs of serious illness. Will discharge patient home with supportive care, and follow-up with PCP within the next few days. Patient's results have been reviewed with them. Patient and/or family have verbally conveyed their understanding and agreement of the patient's signs, symptoms, diagnosis, treatment and prognosis and additionally agree to follow up as recommended or return to the Emergency Room should their condition change prior to follow-up.   Discharge instructions have also been provided to the patient with some educational information regarding their diagnosis as well a list of reasons why they would want to return to the ER prior to their follow-up appointment should their condition change. Amount and/or Complexity of Data Reviewed  Discuss the patient with other providers: yes (ER Attending-Jaiden)          ED Course       Procedures           Pt case including HPI, PE, and all available lab and radiology results has been discussed with attending physician. Opportunity to evaluate patient has been provided to ER attending. Discharge and prescription plan has been agreed upon.

## 2018-11-20 ENCOUNTER — HOSPITAL ENCOUNTER (EMERGENCY)
Age: 33
Discharge: HOME | End: 2018-11-20
Attending: EMERGENCY MEDICINE
Payer: MEDICAID

## 2018-11-20 ENCOUNTER — APPOINTMENT (OUTPATIENT)
Dept: ULTRASOUND IMAGING | Age: 33
End: 2018-11-20
Attending: PHYSICIAN ASSISTANT
Payer: MEDICAID

## 2018-11-20 VITALS
HEIGHT: 65 IN | OXYGEN SATURATION: 97 % | TEMPERATURE: 98.1 F | SYSTOLIC BLOOD PRESSURE: 135 MMHG | RESPIRATION RATE: 14 BRPM | WEIGHT: 237.88 LBS | BODY MASS INDEX: 39.63 KG/M2 | HEART RATE: 87 BPM | DIASTOLIC BLOOD PRESSURE: 74 MMHG

## 2018-11-20 DIAGNOSIS — M79.605 LEFT LEG PAIN: Primary | ICD-10-CM

## 2018-11-20 PROCEDURE — 99282 EMERGENCY DEPT VISIT SF MDM: CPT

## 2018-11-20 PROCEDURE — 93971 EXTREMITY STUDY: CPT

## 2018-11-20 NOTE — ED PROVIDER NOTES
EMERGENCY DEPARTMENT HISTORY AND PHYSICAL EXAM 
 
 
Date: 11/20/2018 Patient Name: Misha Mcdonald History of Presenting Illness Chief Complaint Patient presents with  Leg Pain Left lower calf pain since yesterday. Sent to ED from high risk clinic for risk of DVT. History Provided By: Patient and Patient's Mother HPI: Misha Mcdonald, 26 y.o. female with PMHx significant for ovarian cysts, lupus, migraines, GERD, presents via wheelchair to the ED with cc of new onset left lower calf pain that began yesterday. Pt's mother reports the pt was seen by her OB in clinic today and was referred to the ED with concern for blood clot due to high risk pregnancy. The pt relays that she is high risk pregnancy due to her age and her medical history. The pt reports that she has a cramping calf pain along with numbness and tingling. She additionally relays a tingling sensation in her fingers. The pt notes SOB x 2-3 weeks. She states that she was SOB during her previous pregnancy. She denies a history of asthma and smoking. The pt specifically denies any fevers, chills, cough, and CP. Chief Complaint: Leg Pain Duration: 1 Days Timing:  Acute Location: LLE Quality: Cramping Severity: Moderate Modifying Factors: None There are no other complaints, changes, or physical findings at this time. PCP: Pasha Skaggs MD 
 
Current Outpatient Medications Medication Sig Dispense Refill  buPROPion SR (WELLBUTRIN SR) 150 mg SR tablet Take 150 mg by mouth daily.  phentermine (ADIPEX-P) 37.5 mg tablet Take 37.5 mg by mouth every morning.  vitamin E (AQUA GEMS) 400 unit capsule Take 400 Units by mouth daily.  LORazepam (ATIVAN) 0.5 mg tablet Take 0.5 mg by mouth two (2) times daily as needed.  cetirizine (ZYRTEC) 10 mg tablet Take 1 Tab by mouth daily. 20 Tab 0  propranolol (INDERAL) 20 mg tablet Take 20 mg by mouth daily.  albuterol sulfate 90 mcg/actuation aepb Take 2 Puffs by inhalation four (4) times daily as needed for Cough (SOB, wheezing). 1 Inhaler 0  
 SUMAtriptan (IMITREX) 25 mg tablet Take 25 mg by mouth once as needed for Migraine. Past History Past Medical History: 
Past Medical History:  
Diagnosis Date  Bilateral ovarian cysts  GERD (gastroesophageal reflux disease)  Lupus  Migraine  Neurological disorder   
 migraines  Other ill-defined conditions(799.89)   
 lupus  Psychiatric disorder Past Surgical History: 
Past Surgical History:  
Procedure Laterality Date  COLONOSCOPY,DIAGNOSTIC  3/14/2017  HX GI    
 LAPAROSCOPY  
 HX HEENT  16  
 sinus surgery  HX HERNIA REPAIR    
 BILATERAL INGUINAL HERNIA REPAIR  
 HX OTHER SURGICAL    
 hemmorhoid, hernia-groin Family History: 
Family History Problem Relation Age of Onset  Anesth Problems Neg Hx Social History: 
Social History Tobacco Use  Smoking status: Former Smoker Years: 1.00 Last attempt to quit: 3/31/2014 Years since quittin.6  Smokeless tobacco: Never Used Substance Use Topics  Alcohol use: No  
 Drug use: No  
 
 
Allergies: Allergies Allergen Reactions  Reglan [Metoclopramide] Other (comments)  
  anxious  Percocet [Oxycodone-Acetaminophen] Nausea and Vomiting  Phenergan [Promethazine] Other (comments) Makes legs jump & needs benadryl with it  Zofran [Ondansetron Hcl (Pf)] Other (comments) Per patient Darryl Sanchez me edgy\"  Compazine [Prochlorperazine Edisylate] Other (comments) Patient felt very anxious and apprehensive after IV dose. Review of Systems Review of Systems Constitutional: Negative for chills, fatigue and fever. HENT: Negative for ear pain and sore throat. Eyes: Negative for pain, redness and visual disturbance. Respiratory: Positive for shortness of breath. Negative for cough. Cardiovascular: Negative for chest pain and palpitations. Gastrointestinal: Negative for abdominal pain, nausea and vomiting. Genitourinary: Negative for dysuria, frequency and urgency. Musculoskeletal: Positive for myalgias (LLE). Negative for back pain, gait problem, neck pain and neck stiffness. Skin: Negative for rash and wound. Neurological: Positive for numbness (LLE). Negative for dizziness, weakness, light-headedness and headaches. Physical Exam  
Physical Exam  
Constitutional: She is oriented to person, place, and time. She appears well-developed and well-nourished. Non-toxic appearance. No distress. Big smile. Pleasant. HENT:  
Head: Normocephalic and atraumatic. Right Ear: External ear normal.  
Left Ear: External ear normal.  
Nose: Nose normal.  
Mouth/Throat: Uvula is midline. No trismus in the jaw. Eyes: Conjunctivae and EOM are normal. Pupils are equal, round, and reactive to light. No scleral icterus. Neck: Normal range of motion and full passive range of motion without pain. Cardiovascular: Normal rate and regular rhythm. Heart rate 89 on exam.  
Pulmonary/Chest: Effort normal. No accessory muscle usage. No tachypnea. No respiratory distress. She has no decreased breath sounds. She has no wheezes. Breathing unlabored. Lungs clear. Abdominal: Soft. There is no tenderness. Musculoskeletal: Normal range of motion. Good symmetry. No bruising, redness or swelling. Mild posterior calf tenderness. Neurological: She is alert and oriented to person, place, and time. She is not disoriented. No cranial nerve deficit. GCS eye subscore is 4. GCS verbal subscore is 5. GCS motor subscore is 6. Skin: Skin is intact. No rash noted. Psychiatric: She has a normal mood and affect. Her speech is normal.  
Nursing note and vitals reviewed. Diagnostic Study Results Labs - No results found for this or any previous visit (from the past 12 hour(s)). Radiologic Studies -  
DUPLEX LOWER EXT VENOUS LEFT Final Result CT Results  (Last 48 hours) None CXR Results  (Last 48 hours) None Medical Decision Making I am the first provider for this patient. I reviewed the vital signs, available nursing notes, past medical history, past surgical history, family history and social history. Vital Signs-Reviewed the patient's vital signs. Patient Vitals for the past 12 hrs: 
 Temp Pulse Resp BP SpO2  
11/20/18 1125 98.1 °F (36.7 °C) 87 14 135/74 97 % Pulse Oximetry Analysis - 97% on RA Cardiac Monitor:  
Rate: 87 bpm 
Rhythm: Normal Sinus Rhythm Records Reviewed: Nursing Notes, Old Medical Records, Previous Radiology Studies and Previous Laboratory Studies Provider Notes (Medical Decision Making): DDx: high risk pregnancy, DVT, Baker's cyst, strain ED Course:  
Initial assessment performed. The patients presenting problems have been discussed, and they are in agreement with the care plan formulated and outlined with them. I have encouraged them to ask questions as they arise throughout their visit. Disposition: 
Discharge PLAN: 
1. Discharge Medication List as of 11/20/2018  2:11 PM  
  
 
2. Follow-up Information Follow up With Specialties Details Why Contact Info Ion Wilson MD Family Practice Schedule an appointment as soon as possible for a visit As needed [address] [facility]. 
[phone] Return to ED if worse Diagnosis Clinical Impression: 1. Left leg pain

## 2018-11-20 NOTE — ED NOTES
All discharge paperwork reviewed with patient chapito NAVARRO and she denies any need for further explanation regarding these instructions.   deneis need for wheelchair and ambulates out of ED

## 2018-11-26 ENCOUNTER — APPOINTMENT (OUTPATIENT)
Dept: CT IMAGING | Age: 33
End: 2018-11-26
Attending: EMERGENCY MEDICINE
Payer: MEDICAID

## 2018-11-26 ENCOUNTER — HOSPITAL ENCOUNTER (EMERGENCY)
Age: 33
Discharge: HOME | End: 2018-11-26
Attending: EMERGENCY MEDICINE
Payer: MEDICAID

## 2018-11-26 VITALS
SYSTOLIC BLOOD PRESSURE: 108 MMHG | TEMPERATURE: 98.2 F | WEIGHT: 235 LBS | RESPIRATION RATE: 18 BRPM | BODY MASS INDEX: 39.15 KG/M2 | HEART RATE: 90 BPM | OXYGEN SATURATION: 95 % | HEIGHT: 65 IN | DIASTOLIC BLOOD PRESSURE: 66 MMHG

## 2018-11-26 DIAGNOSIS — R06.02 SOB (SHORTNESS OF BREATH): Primary | ICD-10-CM

## 2018-11-26 LAB
ALBUMIN SERPL-MCNC: 2.5 G/DL (ref 3.5–5)
ALBUMIN/GLOB SERPL: 0.6 {RATIO} (ref 1.1–2.2)
ALP SERPL-CCNC: 68 U/L (ref 45–117)
ALT SERPL-CCNC: 18 U/L (ref 12–78)
ANION GAP SERPL CALC-SCNC: 9 MMOL/L (ref 5–15)
APPEARANCE UR: ABNORMAL
AST SERPL-CCNC: 14 U/L (ref 15–37)
ATRIAL RATE: 82 BPM
BACTERIA URNS QL MICRO: NEGATIVE /HPF
BASOPHILS # BLD: 0.1 K/UL (ref 0–0.1)
BASOPHILS NFR BLD: 1 % (ref 0–1)
BILIRUB SERPL-MCNC: 0.1 MG/DL (ref 0.2–1)
BILIRUB UR QL: NEGATIVE
BUN SERPL-MCNC: 8 MG/DL (ref 6–20)
BUN/CREAT SERPL: 10 (ref 12–20)
CALCIUM SERPL-MCNC: 8.5 MG/DL (ref 8.5–10.1)
CALCULATED P AXIS, ECG09: 37 DEGREES
CALCULATED R AXIS, ECG10: 32 DEGREES
CALCULATED T AXIS, ECG11: 24 DEGREES
CHLORIDE SERPL-SCNC: 108 MMOL/L (ref 97–108)
CO2 SERPL-SCNC: 20 MMOL/L (ref 21–32)
COLOR UR: ABNORMAL
COMMENT, HOLDF: NORMAL
CREAT SERPL-MCNC: 0.8 MG/DL (ref 0.55–1.02)
DIAGNOSIS, 93000: NORMAL
DIFFERENTIAL METHOD BLD: ABNORMAL
EOSINOPHIL # BLD: 0.1 K/UL (ref 0–0.4)
EOSINOPHIL NFR BLD: 1 % (ref 0–7)
EPITH CASTS URNS QL MICRO: ABNORMAL /LPF
ERYTHROCYTE [DISTWIDTH] IN BLOOD BY AUTOMATED COUNT: 14.2 % (ref 11.5–14.5)
GLOBULIN SER CALC-MCNC: 4.4 G/DL (ref 2–4)
GLUCOSE SERPL-MCNC: 85 MG/DL (ref 65–100)
GLUCOSE UR STRIP.AUTO-MCNC: NEGATIVE MG/DL
HCT VFR BLD AUTO: 34.9 % (ref 35–47)
HGB BLD-MCNC: 11.4 G/DL (ref 11.5–16)
HGB UR QL STRIP: NEGATIVE
HYALINE CASTS URNS QL MICRO: ABNORMAL /LPF (ref 0–5)
IMM GRANULOCYTES # BLD: 0.2 K/UL (ref 0–0.04)
IMM GRANULOCYTES NFR BLD AUTO: 1 % (ref 0–0.5)
KETONES UR QL STRIP.AUTO: NEGATIVE MG/DL
LEUKOCYTE ESTERASE UR QL STRIP.AUTO: NEGATIVE
LYMPHOCYTES # BLD: 2.2 K/UL (ref 0.8–3.5)
LYMPHOCYTES NFR BLD: 17 % (ref 12–49)
MCH RBC QN AUTO: 28.9 PG (ref 26–34)
MCHC RBC AUTO-ENTMCNC: 32.7 G/DL (ref 30–36.5)
MCV RBC AUTO: 88.6 FL (ref 80–99)
MONOCYTES # BLD: 1 K/UL (ref 0–1)
MONOCYTES NFR BLD: 8 % (ref 5–13)
NEUTS SEG # BLD: 9.2 K/UL (ref 1.8–8)
NEUTS SEG NFR BLD: 73 % (ref 32–75)
NITRITE UR QL STRIP.AUTO: NEGATIVE
NRBC # BLD: 0 K/UL (ref 0–0.01)
NRBC BLD-RTO: 0 PER 100 WBC
P-R INTERVAL, ECG05: 156 MS
PH UR STRIP: 6.5 [PH] (ref 5–8)
PLATELET # BLD AUTO: 210 K/UL (ref 150–400)
PMV BLD AUTO: 10.3 FL (ref 8.9–12.9)
POTASSIUM SERPL-SCNC: 4 MMOL/L (ref 3.5–5.1)
PROT SERPL-MCNC: 6.9 G/DL (ref 6.4–8.2)
PROT UR STRIP-MCNC: NEGATIVE MG/DL
Q-T INTERVAL, ECG07: 398 MS
QRS DURATION, ECG06: 84 MS
QTC CALCULATION (BEZET), ECG08: 464 MS
RBC # BLD AUTO: 3.94 M/UL (ref 3.8–5.2)
RBC #/AREA URNS HPF: ABNORMAL /HPF (ref 0–5)
SAMPLES BEING HELD,HOLD: NORMAL
SODIUM SERPL-SCNC: 137 MMOL/L (ref 136–145)
SP GR UR REFRACTOMETRY: 1.02 (ref 1–1.03)
UR CULT HOLD, URHOLD: NORMAL
UROBILINOGEN UR QL STRIP.AUTO: 0.2 EU/DL (ref 0.2–1)
VENTRICULAR RATE, ECG03: 82 BPM
WBC # BLD AUTO: 12.6 K/UL (ref 3.6–11)
WBC URNS QL MICRO: ABNORMAL /HPF (ref 0–4)

## 2018-11-26 PROCEDURE — 81001 URINALYSIS AUTO W/SCOPE: CPT

## 2018-11-26 PROCEDURE — 85025 COMPLETE CBC W/AUTO DIFF WBC: CPT

## 2018-11-26 PROCEDURE — 71275 CT ANGIOGRAPHY CHEST: CPT

## 2018-11-26 PROCEDURE — 80053 COMPREHEN METABOLIC PANEL: CPT

## 2018-11-26 PROCEDURE — 74011000258 HC RX REV CODE- 258: Performed by: RADIOLOGY

## 2018-11-26 PROCEDURE — 36415 COLL VENOUS BLD VENIPUNCTURE: CPT

## 2018-11-26 PROCEDURE — 99284 EMERGENCY DEPT VISIT MOD MDM: CPT

## 2018-11-26 PROCEDURE — 93005 ELECTROCARDIOGRAM TRACING: CPT

## 2018-11-26 PROCEDURE — 74011636320 HC RX REV CODE- 636/320: Performed by: RADIOLOGY

## 2018-11-26 RX ORDER — SODIUM CHLORIDE 0.9 % (FLUSH) 0.9 %
5-10 SYRINGE (ML) INJECTION AS NEEDED
Status: DISCONTINUED | OUTPATIENT
Start: 2018-11-26 | End: 2018-11-26 | Stop reason: HOSPADM

## 2018-11-26 RX ORDER — SODIUM CHLORIDE 0.9 % (FLUSH) 0.9 %
10 SYRINGE (ML) INJECTION
Status: COMPLETED | OUTPATIENT
Start: 2018-11-26 | End: 2018-11-26

## 2018-11-26 RX ADMIN — IOPAMIDOL 100 ML: 755 INJECTION, SOLUTION INTRAVENOUS at 15:09

## 2018-11-26 RX ADMIN — Medication 10 ML: at 15:09

## 2018-11-26 RX ADMIN — SODIUM CHLORIDE 100 ML: 900 INJECTION, SOLUTION INTRAVENOUS at 15:09

## 2018-11-26 NOTE — ED NOTES
Patient has received discharge instructions from ER MD, verbalizes understanding. Ambulatory upon discharge with family

## 2018-11-26 NOTE — ED TRIAGE NOTES
Patient arrives c/o SOB x2 weeks with no cough, states may be at the beginning of a sinus infection. Patient is 29 weeks pregnant. Denies fever, does c/o chills.

## 2018-11-26 NOTE — ED PROVIDER NOTES
26 y.o.  female with past medical history significant for Migraines, Lupus, Ovarian cysts, GERD and Psychiatric disorder who presents from home via private vehicle with chief complaint of SOB. Pt reports sudden onset earlier today while cleaning dishes of worsening SOB accompanied by generalized weakness, fatigue, and palpitations. Pt reports being SOB for \"a couple of weeks\" and states she falls short of breath \"after walking short distances\". Pt denies history of similar symptoms. Pt reports she is \"29 weeks pregnant\" and denies any complications, but states she was seen in ED last week for possible blood clot in leg and had doppler performed which resulted negative. Pt also reports she feels as though she may have a \"sinus infection\". Pt denies history of Asthma, COPD, or heart problems. Pt denies any cough or fever. There are no other acute medical concerns at this time. Old Chart Review: Pt was last seen in ED on 18 for L leg swelling. Pt had normal duplex venous doppler examination, was discharged home, and recommended to follow up with PCP as needed. Social hx: Former smoker (Quit: 3/31/2014); No EtOH use PCP: Ion Wilson MD 
OBGYN: Christen Pan MD/ Jearline Fleischer, MD/ Mallory Griffith MD 
 
 
Note written by Darin Haro, as dictated by Gonzalez Ba MD 12:52PM 
 
 
 
The history is provided by the patient and medical records. No  was used. Past Medical History:  
Diagnosis Date  Bilateral ovarian cysts  GERD (gastroesophageal reflux disease)  Lupus  Migraine  Neurological disorder   
 migraines  Other ill-defined conditions(799.89)   
 lupus  Psychiatric disorder Past Surgical History:  
Procedure Laterality Date  COLONOSCOPY,DIAGNOSTIC  3/14/2017  HX GI    
 LAPAROSCOPY  
 HX HEENT  16  
 sinus surgery  HX HERNIA REPAIR    
 BILATERAL INGUINAL HERNIA REPAIR  
 HX OTHER SURGICAL hemmorhoid, hernia-groin Family History:  
Problem Relation Age of Onset  Anesth Problems Neg Hx Social History Socioeconomic History  Marital status: SINGLE Spouse name: Not on file  Number of children: Not on file  Years of education: Not on file  Highest education level: Not on file Social Needs  Financial resource strain: Not on file  Food insecurity - worry: Not on file  Food insecurity - inability: Not on file  Transportation needs - medical: Not on file  Transportation needs - non-medical: Not on file Occupational History  Not on file Tobacco Use  Smoking status: Former Smoker Years: 1.00 Last attempt to quit: 3/31/2014 Years since quittin.6  Smokeless tobacco: Never Used Substance and Sexual Activity  Alcohol use: No  
 Drug use: No  
 Sexual activity: Not on file Other Topics Concern  Not on file Social History Narrative  Not on file ALLERGIES: Reglan [metoclopramide]; Percocet [oxycodone-acetaminophen]; Phenergan [promethazine]; Zofran [ondansetron hcl (pf)]; and Compazine [prochlorperazine edisylate] Review of Systems Constitutional: Positive for fatigue. Negative for appetite change, chills and fever. HENT: Negative for rhinorrhea, sore throat and trouble swallowing. Eyes: Negative for photophobia. Respiratory: Positive for shortness of breath. Negative for cough. Cardiovascular: Positive for palpitations. Negative for chest pain. Gastrointestinal: Negative for abdominal pain, nausea and vomiting. Genitourinary: Negative for dysuria, frequency and hematuria. Musculoskeletal: Negative for arthralgias. Neurological: Positive for weakness (Generalized. ). Negative for dizziness and syncope. Psychiatric/Behavioral: Negative for behavioral problems. The patient is not nervous/anxious. All other systems reviewed and are negative. Vitals:  
 18 1230 18 1247 BP:  132/87 Pulse: (!) 113 (!) 105 Resp:  22 SpO2: 98% 98% Weight:  106.6 kg (235 lb) Height:  5' 5\" (1.651 m) Physical Exam  
Constitutional: She appears well-developed and well-nourished. HENT:  
Head: Normocephalic and atraumatic. Mouth/Throat: Oropharynx is clear and moist.  
Eyes: EOM are normal. Pupils are equal, round, and reactive to light. Neck: Normal range of motion. Neck supple. Cardiovascular: Normal rate, regular rhythm, normal heart sounds and intact distal pulses. Exam reveals no gallop and no friction rub. No murmur heard. Pulmonary/Chest: Effort normal. No respiratory distress. She has no wheezes. She has no rales. Abdominal: Soft. There is no tenderness. There is no rebound. Uterine fundus palpable over umbilicus. Musculoskeletal: Normal range of motion. She exhibits no tenderness. Neurological: She is alert. No cranial nerve deficit. Motor; symmetric Skin: No erythema. Psychiatric: She has a normal mood and affect. Her behavior is normal.  
Nursing note and vitals reviewed. Note written by Darin Silva, as dictated by Vega Barriga MD 12:52PM 
 
MDM Procedures

## 2018-11-26 NOTE — DISCHARGE INSTRUCTIONS
Shortness of Breath: Care Instructions  Your Care Instructions  Shortness of breath has many causes. Sometimes conditions such as anxiety can lead to shortness of breath. Some people get mild shortness of breath when they exercise. Trouble breathing also can be a symptom of a serious problem, such as asthma, lung disease, emphysema, heart problems, and pneumonia. If your shortness of breath continues, you may need tests and treatment. Watch for any changes in your breathing and other symptoms. Follow-up care is a key part of your treatment and safety. Be sure to make and go to all appointments, and call your doctor if you are having problems. It's also a good idea to know your test results and keep a list of the medicines you take. How can you care for yourself at home? · Do not smoke or allow others to smoke around you. If you need help quitting, talk to your doctor about stop-smoking programs and medicines. These can increase your chances of quitting for good. · Get plenty of rest and sleep. · Take your medicines exactly as prescribed. Call your doctor if you think you are having a problem with your medicine. · Find healthy ways to deal with stress. ? Exercise daily. ? Get plenty of sleep. ? Eat regularly and well. When should you call for help? Call 911 anytime you think you may need emergency care. For example, call if:    · You have severe shortness of breath.     · You have symptoms of a heart attack. These may include:  ? Chest pain or pressure, or a strange feeling in the chest.  ? Sweating. ? Shortness of breath. ? Nausea or vomiting. ? Pain, pressure, or a strange feeling in the back, neck, jaw, or upper belly or in one or both shoulders or arms. ? Lightheadedness or sudden weakness. ? A fast or irregular heartbeat. After you call 911, the  may tell you to chew 1 adult-strength or 2 to 4 low-dose aspirin. Wait for an ambulance.  Do not try to drive yourself.    Call your doctor now or seek immediate medical care if:    · Your shortness of breath gets worse or you start to wheeze. Wheezing is a high-pitched sound when you breathe.     · You wake up at night out of breath or have to prop your head up on several pillows to breathe.     · You are short of breath after only light activity or while at rest.    Watch closely for changes in your health, and be sure to contact your doctor if:    · You do not get better over the next 1 to 2 days. Where can you learn more? Go to http://jose-jacek.info/. Enter S780 in the search box to learn more about \"Shortness of Breath: Care Instructions. \"  Current as of: December 6, 2017  Content Version: 11.8  © 8091-5477 [employer]. Care instructions adapted under license by [employer] (which disclaims liability or warranty for this information). If you have questions about a medical condition or this instruction, always ask your healthcare professional. [address] any warranty or liability for your use of this information.

## 2019-01-10 ENCOUNTER — HOSPITAL ENCOUNTER (EMERGENCY)
Age: 34
Discharge: HOME | End: 2019-01-10
Attending: EMERGENCY MEDICINE
Payer: MEDICAID

## 2019-01-10 VITALS
HEART RATE: 92 BPM | TEMPERATURE: 98.1 F | DIASTOLIC BLOOD PRESSURE: 88 MMHG | RESPIRATION RATE: 16 BRPM | OXYGEN SATURATION: 98 % | SYSTOLIC BLOOD PRESSURE: 137 MMHG

## 2019-01-10 DIAGNOSIS — L29.9 PRURITUS: Primary | ICD-10-CM

## 2019-01-10 LAB
ALBUMIN SERPL-MCNC: 2.5 G/DL (ref 3.5–5)
ALBUMIN/GLOB SERPL: 0.5 {RATIO} (ref 1.1–2.2)
ALP SERPL-CCNC: 93 U/L (ref 45–117)
ALT SERPL-CCNC: 16 U/L (ref 12–78)
ANION GAP SERPL CALC-SCNC: 11 MMOL/L (ref 5–15)
APPEARANCE UR: ABNORMAL
AST SERPL-CCNC: 16 U/L (ref 15–37)
BACTERIA URNS QL MICRO: NEGATIVE /HPF
BASOPHILS # BLD: 0.1 K/UL (ref 0–0.1)
BASOPHILS NFR BLD: 0 % (ref 0–1)
BILIRUB SERPL-MCNC: 0.1 MG/DL (ref 0.2–1)
BILIRUB UR QL: NEGATIVE
BUN SERPL-MCNC: 9 MG/DL (ref 6–20)
BUN/CREAT SERPL: 13 (ref 12–20)
CALCIUM SERPL-MCNC: 8.9 MG/DL (ref 8.5–10.1)
CHLORIDE SERPL-SCNC: 103 MMOL/L (ref 97–108)
CO2 SERPL-SCNC: 24 MMOL/L (ref 21–32)
COLOR UR: ABNORMAL
COMMENT, HOLDF: NORMAL
CREAT SERPL-MCNC: 0.67 MG/DL (ref 0.55–1.02)
DIFFERENTIAL METHOD BLD: ABNORMAL
EOSINOPHIL # BLD: 0.2 K/UL (ref 0–0.4)
EOSINOPHIL NFR BLD: 1 % (ref 0–7)
EPITH CASTS URNS QL MICRO: ABNORMAL /LPF
ERYTHROCYTE [DISTWIDTH] IN BLOOD BY AUTOMATED COUNT: 14.1 % (ref 11.5–14.5)
GLOBULIN SER CALC-MCNC: 4.8 G/DL (ref 2–4)
GLUCOSE SERPL-MCNC: 101 MG/DL (ref 65–100)
GLUCOSE UR STRIP.AUTO-MCNC: NEGATIVE MG/DL
HCT VFR BLD AUTO: 37.1 % (ref 35–47)
HGB BLD-MCNC: 11.9 G/DL (ref 11.5–16)
HGB UR QL STRIP: NEGATIVE
IMM GRANULOCYTES # BLD AUTO: 0.2 K/UL (ref 0–0.04)
IMM GRANULOCYTES NFR BLD AUTO: 2 % (ref 0–0.5)
INR PPP: 0.9 (ref 0.9–1.1)
KETONES UR QL STRIP.AUTO: NEGATIVE MG/DL
LEUKOCYTE ESTERASE UR QL STRIP.AUTO: NEGATIVE
LIPASE SERPL-CCNC: 348 U/L (ref 73–393)
LYMPHOCYTES # BLD: 2.7 K/UL (ref 0.8–3.5)
LYMPHOCYTES NFR BLD: 21 % (ref 12–49)
MCH RBC QN AUTO: 28.5 PG (ref 26–34)
MCHC RBC AUTO-ENTMCNC: 32.1 G/DL (ref 30–36.5)
MCV RBC AUTO: 89 FL (ref 80–99)
MONOCYTES # BLD: 1 K/UL (ref 0–1)
MONOCYTES NFR BLD: 8 % (ref 5–13)
NEUTS SEG # BLD: 8.7 K/UL (ref 1.8–8)
NEUTS SEG NFR BLD: 68 % (ref 32–75)
NITRITE UR QL STRIP.AUTO: NEGATIVE
NRBC # BLD: 0 K/UL (ref 0–0.01)
NRBC BLD-RTO: 0 PER 100 WBC
PH UR STRIP: 6.5 [PH] (ref 5–8)
PLATELET # BLD AUTO: 210 K/UL (ref 150–400)
PMV BLD AUTO: 10 FL (ref 8.9–12.9)
POTASSIUM SERPL-SCNC: 3.6 MMOL/L (ref 3.5–5.1)
PROT SERPL-MCNC: 7.3 G/DL (ref 6.4–8.2)
PROT UR STRIP-MCNC: NEGATIVE MG/DL
PROTHROMBIN TIME: 9.4 SEC (ref 9–11.1)
RBC # BLD AUTO: 4.17 M/UL (ref 3.8–5.2)
RBC #/AREA URNS HPF: ABNORMAL /HPF (ref 0–5)
SAMPLES BEING HELD,HOLD: NORMAL
SODIUM SERPL-SCNC: 138 MMOL/L (ref 136–145)
SP GR UR REFRACTOMETRY: 1.02 (ref 1–1.03)
UR CULT HOLD, URHOLD: NORMAL
UROBILINOGEN UR QL STRIP.AUTO: 0.2 EU/DL (ref 0.2–1)
WBC # BLD AUTO: 12.9 K/UL (ref 3.6–11)
WBC URNS QL MICRO: ABNORMAL /HPF (ref 0–4)
YEAST URNS QL MICRO: PRESENT

## 2019-01-10 PROCEDURE — 85025 COMPLETE CBC W/AUTO DIFF WBC: CPT

## 2019-01-10 PROCEDURE — 74011250636 HC RX REV CODE- 250/636: Performed by: PHYSICIAN ASSISTANT

## 2019-01-10 PROCEDURE — 36415 COLL VENOUS BLD VENIPUNCTURE: CPT

## 2019-01-10 PROCEDURE — 81001 URINALYSIS AUTO W/SCOPE: CPT

## 2019-01-10 PROCEDURE — 83690 ASSAY OF LIPASE: CPT

## 2019-01-10 PROCEDURE — 99283 EMERGENCY DEPT VISIT LOW MDM: CPT

## 2019-01-10 PROCEDURE — 96374 THER/PROPH/DIAG INJ IV PUSH: CPT

## 2019-01-10 PROCEDURE — 80053 COMPREHEN METABOLIC PANEL: CPT

## 2019-01-10 PROCEDURE — 96361 HYDRATE IV INFUSION ADD-ON: CPT

## 2019-01-10 PROCEDURE — 85610 PROTHROMBIN TIME: CPT

## 2019-01-10 RX ORDER — DIPHENHYDRAMINE HYDROCHLORIDE 50 MG/ML
50 INJECTION, SOLUTION INTRAMUSCULAR; INTRAVENOUS
Status: COMPLETED | OUTPATIENT
Start: 2019-01-10 | End: 2019-01-10

## 2019-01-10 RX ORDER — DIPHENHYDRAMINE HCL 50 MG
50 CAPSULE ORAL
Status: DISCONTINUED | OUTPATIENT
Start: 2019-01-10 | End: 2019-01-10

## 2019-01-10 RX ADMIN — DIPHENHYDRAMINE HYDROCHLORIDE 50 MG: 50 INJECTION, SOLUTION INTRAMUSCULAR; INTRAVENOUS at 01:53

## 2019-01-10 RX ADMIN — SODIUM CHLORIDE 1000 ML: 900 INJECTION, SOLUTION INTRAVENOUS at 01:51

## 2019-01-10 NOTE — ED PROVIDER NOTES
27 yo 39 week pregnant female here for evaluation of itching. States symptoms over the past 2 days; worse in the past 2 hours. No new soaps, detergents, etc.  
No medications taken. NO abd pain, bleeding, cramping. GYN Dr Lola Nance. The history is provided by the patient. Skin Problem This is a new problem. The current episode started 2 days ago. The problem has been gradually worsening. The problem is associated with nothing. There has been no fever. The pain is at a severity of 0/10. The patient is experiencing no pain. Associated symptoms include itching. Pertinent negatives include no pain. She has tried nothing for the symptoms. Pregnancy Problem Pertinent negatives include no fever and no vomiting. Past Medical History:  
Diagnosis Date  Bilateral ovarian cysts  GERD (gastroesophageal reflux disease)  Lupus  Migraine  Neurological disorder   
 migraines  Other ill-defined conditions(799.89)   
 lupus  Psychiatric disorder Past Surgical History:  
Procedure Laterality Date  COLONOSCOPY N/A 3/14/2017 COLONOSCOPY performed by Everardo Pryor MD at \[facility]\"" ENDOSCOPY  COLONOSCOPY,DIAGNOSTIC  3/14/2017  HX GI    
 LAPAROSCOPY  
 HX HEENT  4/4/16  
 sinus surgery  HX HERNIA REPAIR    
 BILATERAL INGUINAL HERNIA REPAIR  
 HX OTHER SURGICAL    
 hemmorhoid, hernia-groin Family History:  
Problem Relation Age of Onset  Anesth Problems Neg Hx Social History Socioeconomic History  Marital status: SINGLE Spouse name: Not on file  Number of children: Not on file  Years of education: Not on file  Highest education level: Not on file Social Needs  Financial resource strain: Not on file  Food insecurity - worry: Not on file  Food insecurity - inability: Not on file  Transportation needs - medical: Not on file  Transportation needs - non-medical: Not on file Occupational History  Not on file Tobacco Use  Smoking status: Former Smoker Years: 1.00 Last attempt to quit: 3/31/2014 Years since quittin.7  Smokeless tobacco: Never Used Substance and Sexual Activity  Alcohol use: No  
 Drug use: No  
 Sexual activity: Not on file Other Topics Concern  Not on file Social History Narrative  Not on file ALLERGIES: Compazine [prochlorperazine edisylate]; Percocet [oxycodone-acetaminophen]; Phenergan [promethazine]; Reglan [metoclopramide]; and Zofran [ondansetron hcl (pf)] Review of Systems Constitutional: Negative for activity change and fever. HENT: Negative for facial swelling. Eyes: Negative for discharge. Respiratory: Negative for cough. Cardiovascular: Negative for leg swelling. Gastrointestinal: Negative for abdominal distention and vomiting. Genitourinary: Negative for pelvic pain, vaginal bleeding and vaginal discharge. Skin: Positive for itching. Negative for color change, rash and wound. Neurological: Negative for seizures and syncope. Psychiatric/Behavioral: Negative for behavioral problems. Vitals:  
 01/10/19 0124 Pulse: 94 SpO2: 95% Physical Exam  
Constitutional: She is oriented to person, place, and time. She appears well-developed and well-nourished. No distress. HENT:  
Head: Normocephalic and atraumatic. Right Ear: External ear normal.  
Left Ear: External ear normal.  
Nose: Nose normal.  
Mouth/Throat: Oropharynx is clear and moist.  
Eyes: Conjunctivae and EOM are normal. Pupils are equal, round, and reactive to light. Right eye exhibits no discharge. Left eye exhibits no discharge. Neck: Normal range of motion. Neck supple. Cardiovascular: Normal rate, regular rhythm, normal heart sounds and intact distal pulses. Pulmonary/Chest: Effort normal and breath sounds normal.  
Abdominal: Soft. Bowel sounds are normal. She exhibits no distension. There is no tenderness. There is no rebound and no guarding. Myra Jock Musculoskeletal: Normal range of motion. She exhibits no edema or tenderness. Neurological: She is alert and oriented to person, place, and time. No cranial nerve deficit. Coordination normal.  
Skin: Skin is warm and dry. No rash noted. Psychiatric: She has a normal mood and affect. Her behavior is normal. Judgment and thought content normal.  
Nursing note and vitals reviewed. MDM Number of Diagnoses or Management Options Pruritus:  
Diagnosis management comments: 25 yo 39 week pregnant female with pruritis; labs including liver enzymes neg; no abd pain/cramping/bleeding; has appt today with GYN. TAVIA No Amount and/or Complexity of Data Reviewed Clinical lab tests: ordered and reviewed Discuss the patient with other providers: yes Procedures Patient has been reassessed. Feeling much better. Reviewed labs, medications with patient. Ready to discharge home. Discussed case with attending Physician. Agrees with care and will D/C with follow up. Patient's results have been reviewed with them. Patient and/or family have verbally conveyed their understanding and agreement of the patient's signs, symptoms, diagnosis, treatment and prognosis and additionally agree to follow up as recommended or return to the Emergency Room should their condition change prior to follow-up. Discharge instructions have also been provided to the patient with some educational information regarding their diagnosis as well a list of reasons why they would want to return to the ER prior to their follow-up appointment should their condition change.  
TAVIA No

## 2019-01-10 NOTE — ED TRIAGE NOTES
Arrived from home, ambulatory through triage reporting pruritis, chills that started on Tuesday. Denies rash, fever, CP, SOB, vaginal discharge Denies self medicating PTA Pt 36 weeks pregnant.

## 2019-01-18 LAB — GRBS, EXTERNAL: POSITIVE

## 2019-02-07 ENCOUNTER — HOSPITAL ENCOUNTER (INPATIENT)
Age: 34
LOS: 3 days | Discharge: HOME | DRG: 560 | End: 2019-02-10
Attending: SPECIALIST | Admitting: SPECIALIST
Payer: MEDICAID

## 2019-02-07 DIAGNOSIS — F32.A ANXIETY AND DEPRESSION: ICD-10-CM

## 2019-02-07 DIAGNOSIS — Z34.90 ENCOUNTER FOR ELECTIVE INDUCTION OF LABOR: Primary | ICD-10-CM

## 2019-02-07 DIAGNOSIS — F41.9 ANXIETY AND DEPRESSION: ICD-10-CM

## 2019-02-07 LAB
BASOPHILS # BLD: 0 K/UL (ref 0–0.1)
BASOPHILS NFR BLD: 0 % (ref 0–1)
DIFFERENTIAL METHOD BLD: ABNORMAL
EOSINOPHIL # BLD: 0.1 K/UL (ref 0–0.4)
EOSINOPHIL NFR BLD: 1 % (ref 0–7)
ERYTHROCYTE [DISTWIDTH] IN BLOOD BY AUTOMATED COUNT: 15.1 % (ref 11.5–14.5)
HCT VFR BLD AUTO: 37.1 % (ref 35–47)
HGB BLD-MCNC: 12.4 G/DL (ref 11.5–16)
IMM GRANULOCYTES # BLD AUTO: 0.1 K/UL (ref 0–0.04)
IMM GRANULOCYTES NFR BLD AUTO: 1 % (ref 0–0.5)
LYMPHOCYTES # BLD: 2 K/UL (ref 0.8–3.5)
LYMPHOCYTES NFR BLD: 18 % (ref 12–49)
MCH RBC QN AUTO: 29.7 PG (ref 26–34)
MCHC RBC AUTO-ENTMCNC: 33.4 G/DL (ref 30–36.5)
MCV RBC AUTO: 88.8 FL (ref 80–99)
MONOCYTES # BLD: 0.6 K/UL (ref 0–1)
MONOCYTES NFR BLD: 6 % (ref 5–13)
NEUTS SEG # BLD: 8.3 K/UL (ref 1.8–8)
NEUTS SEG NFR BLD: 75 % (ref 32–75)
NRBC # BLD: 0 K/UL (ref 0–0.01)
NRBC BLD-RTO: 0 PER 100 WBC
PLATELET # BLD AUTO: 174 K/UL (ref 150–400)
PMV BLD AUTO: 10.5 FL (ref 8.9–12.9)
RBC # BLD AUTO: 4.18 M/UL (ref 3.8–5.2)
WBC # BLD AUTO: 11.2 K/UL (ref 3.6–11)

## 2019-02-07 PROCEDURE — 75410000002 HC LABOR FEE PER 1 HR

## 2019-02-07 PROCEDURE — 85025 COMPLETE CBC W/AUTO DIFF WBC: CPT

## 2019-02-07 PROCEDURE — 65270000029 HC RM PRIVATE

## 2019-02-07 PROCEDURE — 74011250637 HC RX REV CODE- 250/637: Performed by: ADVANCED PRACTICE MIDWIFE

## 2019-02-07 PROCEDURE — 59200 INSERT CERVICAL DILATOR: CPT

## 2019-02-07 PROCEDURE — 74011250637 HC RX REV CODE- 250/637: Performed by: SPECIALIST

## 2019-02-07 PROCEDURE — 94760 N-INVAS EAR/PLS OXIMETRY 1: CPT

## 2019-02-07 PROCEDURE — 3E0P7VZ INTRODUCTION OF HORMONE INTO FEMALE REPRODUCTIVE, VIA NATURAL OR ARTIFICIAL OPENING: ICD-10-PCS | Performed by: SPECIALIST

## 2019-02-07 PROCEDURE — 36415 COLL VENOUS BLD VENIPUNCTURE: CPT

## 2019-02-07 RX ORDER — ACETAMINOPHEN 500 MG
1000 TABLET ORAL
Status: DISCONTINUED | OUTPATIENT
Start: 2019-02-07 | End: 2019-02-10 | Stop reason: HOSPADM

## 2019-02-07 RX ORDER — SODIUM CHLORIDE 0.9 % (FLUSH) 0.9 %
5-40 SYRINGE (ML) INJECTION EVERY 8 HOURS
Status: DISCONTINUED | OUTPATIENT
Start: 2019-02-07 | End: 2019-02-08 | Stop reason: HOSPADM

## 2019-02-07 RX ORDER — MINERAL OIL
120 OIL (ML) ORAL ONCE
Status: ACTIVE | OUTPATIENT
Start: 2019-02-08 | End: 2019-02-08

## 2019-02-07 RX ORDER — MAG HYDROX/ALUMINUM HYD/SIMETH 200-200-20
30 SUSPENSION, ORAL (FINAL DOSE FORM) ORAL
Status: DISCONTINUED | OUTPATIENT
Start: 2019-02-07 | End: 2019-02-08 | Stop reason: HOSPADM

## 2019-02-07 RX ORDER — SODIUM CHLORIDE 0.9 % (FLUSH) 0.9 %
5-40 SYRINGE (ML) INJECTION AS NEEDED
Status: DISCONTINUED | OUTPATIENT
Start: 2019-02-07 | End: 2019-02-08 | Stop reason: HOSPADM

## 2019-02-07 RX ORDER — CALCIUM CARBONATE 200(500)MG
400 TABLET,CHEWABLE ORAL
Status: DISCONTINUED | OUTPATIENT
Start: 2019-02-07 | End: 2019-02-08 | Stop reason: HOSPADM

## 2019-02-07 RX ORDER — NALOXONE HYDROCHLORIDE 0.4 MG/ML
0.4 INJECTION, SOLUTION INTRAMUSCULAR; INTRAVENOUS; SUBCUTANEOUS AS NEEDED
Status: DISCONTINUED | OUTPATIENT
Start: 2019-02-07 | End: 2019-02-08 | Stop reason: HOSPADM

## 2019-02-07 RX ORDER — ZOLPIDEM TARTRATE 5 MG/1
5 TABLET ORAL
Status: DISCONTINUED | OUTPATIENT
Start: 2019-02-07 | End: 2019-02-08 | Stop reason: HOSPADM

## 2019-02-07 RX ORDER — NALBUPHINE HYDROCHLORIDE 10 MG/ML
10 INJECTION, SOLUTION INTRAMUSCULAR; INTRAVENOUS; SUBCUTANEOUS
Status: DISCONTINUED | OUTPATIENT
Start: 2019-02-07 | End: 2019-02-08 | Stop reason: HOSPADM

## 2019-02-07 RX ORDER — OXYTOCIN/0.9 % SODIUM CHLORIDE 30/500 ML
0-25 PLASTIC BAG, INJECTION (ML) INTRAVENOUS
Status: DISCONTINUED | OUTPATIENT
Start: 2019-02-08 | End: 2019-02-08 | Stop reason: HOSPADM

## 2019-02-07 RX ORDER — SODIUM CHLORIDE, SODIUM LACTATE, POTASSIUM CHLORIDE, CALCIUM CHLORIDE 600; 310; 30; 20 MG/100ML; MG/100ML; MG/100ML; MG/100ML
125 INJECTION, SOLUTION INTRAVENOUS CONTINUOUS
Status: DISCONTINUED | OUTPATIENT
Start: 2019-02-08 | End: 2019-02-08 | Stop reason: HOSPADM

## 2019-02-07 RX ORDER — LIDOCAINE HYDROCHLORIDE 10 MG/ML
10 INJECTION INFILTRATION; PERINEURAL ONCE
Status: ACTIVE | OUTPATIENT
Start: 2019-02-07 | End: 2019-02-08

## 2019-02-07 RX ORDER — ACETAMINOPHEN 325 MG/1
650 TABLET ORAL
Status: DISCONTINUED | OUTPATIENT
Start: 2019-02-07 | End: 2019-02-07

## 2019-02-07 RX ORDER — ACETAMINOPHEN 500 MG
500 TABLET ORAL
Status: DISCONTINUED | OUTPATIENT
Start: 2019-02-07 | End: 2019-02-07

## 2019-02-07 RX ADMIN — ZOLPIDEM TARTRATE 5 MG: 5 TABLET ORAL at 21:34

## 2019-02-07 RX ADMIN — DINOPROSTONE 10 MG: 10 INSERT VAGINAL at 17:58

## 2019-02-07 RX ADMIN — Medication 10 ML: at 20:23

## 2019-02-07 RX ADMIN — ACETAMINOPHEN 1000 MG: 500 TABLET ORAL at 20:20

## 2019-02-07 RX ADMIN — Medication 10 ML: at 17:30

## 2019-02-07 NOTE — H&P
History & Physical 
 
Name: Darlene Santos MRN: [id number]  SSN: [id number] YOB: 1985  Age: 26 y.o. Sex: female Subjective:  
 
Estimated Date of Delivery: 19 OB History  Para Term  AB Living 2  1 SAB TAB Ectopic Molar Multiple Live Births # Outcome Date GA Lbr Navdeep/2nd Weight Sex Delivery Anes PTL Lv  
1 Current Ms. Gloria Hewitt is admitted with pregnancy at 39w2d for elective induction of labor. Prenatal course was complicated by UTI, migraines, and suspected macrosomia. She reports a hx of pre-eclampsia with her first child. Please see prenatal records for details. Past Medical History:  
Diagnosis Date  Bilateral ovarian cysts  GERD (gastroesophageal reflux disease)  Lupus  Migraine  Neurological disorder   
 migraines  Other ill-defined conditions(799.89)   
 lupus  Psychiatric disorder Past Surgical History:  
Procedure Laterality Date  COLONOSCOPY N/A 3/14/2017 COLONOSCOPY performed by Almas Forde MD at [facility] ENDOSCOPY  COLONOSCOPY,DIAGNOSTIC  3/14/2017  HX GI    
 LAPAROSCOPY  
 HX HEENT  16  
 sinus surgery  HX HERNIA REPAIR    
 BILATERAL INGUINAL HERNIA REPAIR  
 HX OTHER SURGICAL    
 hemmorhoid, hernia-groin Social History Occupational History  Not on file Tobacco Use  Smoking status: Former Smoker Years: 1.00 Last attempt to quit: 3/31/2014 Years since quittin.8  Smokeless tobacco: Never Used Substance and Sexual Activity  Alcohol use: No  
 Drug use: No  
 Sexual activity: Not on file Family History Problem Relation Age of Onset  Anesth Problems Neg Hx Allergies Allergen Reactions  Morphine Other (comments) Pt states it makes her \"very angry\"  Compazine [Prochlorperazine Edisylate] Other (comments) Patient felt very anxious and apprehensive after IV dose.  Percocet [Oxycodone-Acetaminophen] Nausea and Vomiting  Phenergan [Promethazine] Other (comments) Pt states this is NOT an allergy!  Reglan [Metoclopramide] Other (comments)  
  anxious  Zofran [Ondansetron Hcl (Pf)] Other (comments) Per patient Radha Herzog me edgy\" Prior to Admission medications Medication Sig Start Date End Date Taking? Authorizing Provider  
ferrous sulfate (SLOW FE PO) Take 1 Tab by mouth. Yes Provider, Historical  
PNV CU.00/TFXUGVW fum/folic ac (PRENATAL PO) Take 1 Tab by mouth. Yes Provider, Historical  
cetirizine (ZYRTEC) 10 mg tablet Take 1 Tab by mouth daily. 2/16/17  Yes Alisson Claudio PA  
buPROPion SR [facility] SR) 150 mg SR tablet Take 150 mg by mouth daily. Provider, Historical  
phentermine (ADIPEX-P) 37.5 mg tablet Take 37.5 mg by mouth every morning. Provider, Historical  
vitamin E (AQUA GEMS) 400 unit capsule Take 400 Units by mouth daily. Provider, Historical  
LORazepam (ATIVAN) 0.5 mg tablet Take 0.5 mg by mouth two (2) times daily as needed. Provider, Historical  
propranolol (INDERAL) 20 mg tablet Take 20 mg by mouth daily. Severiano Arias MD  
albuterol sulfate 90 mcg/actuation aepb Take 2 Puffs by inhalation four (4) times daily as needed for Cough (SOB, wheezing). 9/3/16   Sarah Leggett NP  
SUMAtriptan (IMITREX) 25 mg tablet Take 25 mg by mouth once as needed for Migraine. Severiano Arias MD  
  
 
Review of Systems: Pertinent items are noted in the History of Present Illness. Objective:  
 
Vitals: 
Vitals:  
 02/07/19 1656 02/07/19 1704 BP: 150/88 Pulse: 93 Resp: 18 Temp: 98.1 °F (36.7 °C) Weight:  127 kg (280 lb) Height:  5' 5\" (1.651 m) Physical Exam: 
Patient without distress. Heart: Regular rate and rhythm or S1S2 present Lung: clear to auscultation throughout lung fields, no wheezes, no rales, no rhonchi and normal respiratory effort Abdomen: soft, nontender Fundus: soft and non tender Perineum: blood absent, amniotic fluid absent Cervical Exam: 2 cm dilated 70% effaced   
-1 station Presenting Part: cephalic Cervical Position: posterior Consistency: Soft Lower Extremities:  - Edema 2+ Membranes:  Intact Fetal Heart Rate: Reactive Uterine contractions: irregular, every 3-4 minutes, moderate strength Prenatal Labs:  
No results found for: ABORH, RUBELLAEXT, HBSAGEXT, HIVEXT, RPREXT, GONNOEXT, CHLAMEXT, ABORHEXT, RUBELLAEXT, GRBSEXT, HBSAGEXT, HIVEXT, RPREXT, GONNOEXT, CHLAMEXT Impression/Plan: Active Problems: 
  Encounter for elective induction of labor (2/7/2019) Plan: Admit for induction of labor. Group B Strep positive, will treat prophylactically with penicillin. Cervidil placed at 1800 . RN to discontinue cervidil in 12 hours. Please start pitocin infusion one hour after cervidil is out. Plan discussed with patient and mother. She desires an epidural.   
 
Signed By:  Jerzy Vazquez CNM February 7, 2019

## 2019-02-07 NOTE — PROGRESS NOTES
1640-Pt arrived to labor and delivery room 11 for cervidil induction. [facility] 2/12/19. Gown given. Pt of DR Naya Harris. 1720-VIGNESH Moody at bedside, viewed strip. Aware of UC pattern and patient feeling discomfort with them. Discussing POC with patient. 1758-Cervidil placed by VIGNESH Cloud . 2/70. Plan is to keep cervidil in for 12 hours. [address] Brigette Vyas for patient to eat in 1 hour if no regular, painful UCs. Nubain as needed. 1945-Bedside and Verbal shift change report given to CATIE Zhou RN   (oncoming nurse) by Salina Riley RN (offgoing nurse). Report included the following information SBAR. Pt is requesting tylenol for headache. Pt states extra strength tylenol only works. Will call Dr Ab Linton.

## 2019-02-08 ENCOUNTER — ANESTHESIA EVENT (OUTPATIENT)
Dept: LABOR AND DELIVERY | Age: 34
DRG: 560 | End: 2019-02-08
Payer: MEDICAID

## 2019-02-08 ENCOUNTER — ANESTHESIA (OUTPATIENT)
Dept: LABOR AND DELIVERY | Age: 34
DRG: 560 | End: 2019-02-08
Payer: MEDICAID

## 2019-02-08 PROCEDURE — 65410000002 HC RM PRIVATE OB

## 2019-02-08 PROCEDURE — 76060000078 HC EPIDURAL ANESTHESIA

## 2019-02-08 PROCEDURE — 74011250637 HC RX REV CODE- 250/637: Performed by: SPECIALIST

## 2019-02-08 PROCEDURE — 75410000003 HC RECOV DEL/VAG/CSECN EA 0.5 HR

## 2019-02-08 PROCEDURE — 74011250636 HC RX REV CODE- 250/636

## 2019-02-08 PROCEDURE — 74011000250 HC RX REV CODE- 250: Performed by: ANESTHESIOLOGY

## 2019-02-08 PROCEDURE — 74011250637 HC RX REV CODE- 250/637: Performed by: ADVANCED PRACTICE MIDWIFE

## 2019-02-08 PROCEDURE — 75410000000 HC DELIVERY VAGINAL/SINGLE

## 2019-02-08 PROCEDURE — 77030014125 HC TY EPDRL BBMI -B: Performed by: ANESTHESIOLOGY

## 2019-02-08 PROCEDURE — 0HQ9XZZ REPAIR PERINEUM SKIN, EXTERNAL APPROACH: ICD-10-PCS | Performed by: SPECIALIST

## 2019-02-08 PROCEDURE — A4300 CATH IMPL VASC ACCESS PORTAL: HCPCS

## 2019-02-08 PROCEDURE — 74011250636 HC RX REV CODE- 250/636: Performed by: ADVANCED PRACTICE MIDWIFE

## 2019-02-08 PROCEDURE — 75410000002 HC LABOR FEE PER 1 HR

## 2019-02-08 PROCEDURE — 74011000258 HC RX REV CODE- 258: Performed by: ADVANCED PRACTICE MIDWIFE

## 2019-02-08 PROCEDURE — 74011000250 HC RX REV CODE- 250

## 2019-02-08 PROCEDURE — 77030031139 HC SUT VCRL2 J&J -A

## 2019-02-08 PROCEDURE — 3E0R3BZ INTRODUCTION OF ANESTHETIC AGENT INTO SPINAL CANAL, PERCUTANEOUS APPROACH: ICD-10-PCS | Performed by: ANESTHESIOLOGY

## 2019-02-08 RX ORDER — FENTANYL/BUPIVACAINE/NS/PF 2-1250MCG
PREFILLED PUMP RESERVOIR EPIDURAL
Status: DISPENSED
Start: 2019-02-08 | End: 2019-02-09

## 2019-02-08 RX ORDER — BUPIVACAINE HYDROCHLORIDE 5 MG/ML
30 INJECTION, SOLUTION EPIDURAL; INTRACAUDAL AS NEEDED
Status: DISCONTINUED | OUTPATIENT
Start: 2019-02-08 | End: 2019-02-08 | Stop reason: HOSPADM

## 2019-02-08 RX ORDER — NALOXONE HYDROCHLORIDE 0.4 MG/ML
0.4 INJECTION, SOLUTION INTRAMUSCULAR; INTRAVENOUS; SUBCUTANEOUS AS NEEDED
Status: DISCONTINUED | OUTPATIENT
Start: 2019-02-08 | End: 2019-02-10 | Stop reason: HOSPADM

## 2019-02-08 RX ORDER — FENTANYL CITRATE 50 UG/ML
100 INJECTION, SOLUTION INTRAMUSCULAR; INTRAVENOUS ONCE
Status: COMPLETED | OUTPATIENT
Start: 2019-02-08 | End: 2019-02-08

## 2019-02-08 RX ORDER — BUPIVACAINE HYDROCHLORIDE 2.5 MG/ML
INJECTION, SOLUTION EPIDURAL; INFILTRATION; INTRACAUDAL AS NEEDED
Status: DISCONTINUED | OUTPATIENT
Start: 2019-02-08 | End: 2019-02-08 | Stop reason: HOSPADM

## 2019-02-08 RX ORDER — OXYCODONE AND ACETAMINOPHEN 5; 325 MG/1; MG/1
1 TABLET ORAL
Status: DISCONTINUED | OUTPATIENT
Start: 2019-02-08 | End: 2019-02-10 | Stop reason: HOSPADM

## 2019-02-08 RX ORDER — EPHEDRINE SULFATE 50 MG/ML
10 INJECTION, SOLUTION INTRAVENOUS
Status: COMPLETED | OUTPATIENT
Start: 2019-02-08 | End: 2019-02-08

## 2019-02-08 RX ORDER — HYDROCORTISONE ACETATE PRAMOXINE HCL 2.5; 1 G/100G; G/100G
CREAM TOPICAL AS NEEDED
Status: DISCONTINUED | OUTPATIENT
Start: 2019-02-08 | End: 2019-02-10 | Stop reason: HOSPADM

## 2019-02-08 RX ORDER — EPHEDRINE SULFATE 50 MG/ML
10 INJECTION, SOLUTION INTRAVENOUS ONCE
Status: COMPLETED | OUTPATIENT
Start: 2019-02-08 | End: 2019-02-08

## 2019-02-08 RX ORDER — FENTANYL/BUPIVACAINE/NS/PF 2-1250MCG
1-16 PREFILLED PUMP RESERVOIR EPIDURAL CONTINUOUS
Status: DISCONTINUED | OUTPATIENT
Start: 2019-02-08 | End: 2019-02-10 | Stop reason: HOSPADM

## 2019-02-08 RX ORDER — NALOXONE HYDROCHLORIDE 0.4 MG/ML
0.4 INJECTION, SOLUTION INTRAMUSCULAR; INTRAVENOUS; SUBCUTANEOUS AS NEEDED
Status: DISCONTINUED | OUTPATIENT
Start: 2019-02-08 | End: 2019-02-08 | Stop reason: HOSPADM

## 2019-02-08 RX ORDER — FENTANYL CITRATE 50 UG/ML
INJECTION, SOLUTION INTRAMUSCULAR; INTRAVENOUS
Status: COMPLETED
Start: 2019-02-08 | End: 2019-02-08

## 2019-02-08 RX ORDER — IBUPROFEN 400 MG/1
800 TABLET ORAL EVERY 8 HOURS
Status: DISCONTINUED | OUTPATIENT
Start: 2019-02-08 | End: 2019-02-10 | Stop reason: HOSPADM

## 2019-02-08 RX ORDER — LIDOCAINE HYDROCHLORIDE AND EPINEPHRINE 15; 5 MG/ML; UG/ML
4.5 INJECTION, SOLUTION EPIDURAL AS NEEDED
Status: DISCONTINUED | OUTPATIENT
Start: 2019-02-08 | End: 2019-02-08 | Stop reason: HOSPADM

## 2019-02-08 RX ORDER — EPHEDRINE SULFATE 50 MG/ML
INJECTION, SOLUTION INTRAVENOUS
Status: COMPLETED
Start: 2019-02-08 | End: 2019-02-08

## 2019-02-08 RX ORDER — ZOLPIDEM TARTRATE 5 MG/1
5 TABLET ORAL
Status: DISCONTINUED | OUTPATIENT
Start: 2019-02-08 | End: 2019-02-10 | Stop reason: HOSPADM

## 2019-02-08 RX ORDER — LIDOCAINE HYDROCHLORIDE AND EPINEPHRINE 15; 5 MG/ML; UG/ML
INJECTION, SOLUTION EPIDURAL AS NEEDED
Status: DISCONTINUED | OUTPATIENT
Start: 2019-02-08 | End: 2019-02-08 | Stop reason: HOSPADM

## 2019-02-08 RX ORDER — BUPIVACAINE HYDROCHLORIDE 5 MG/ML
INJECTION, SOLUTION EPIDURAL; INTRACAUDAL
Status: COMPLETED
Start: 2019-02-08 | End: 2019-02-08

## 2019-02-08 RX ADMIN — LIDOCAINE HYDROCHLORIDE AND EPINEPHRINE 3 ML: 15; 5 INJECTION, SOLUTION EPIDURAL at 15:37

## 2019-02-08 RX ADMIN — Medication 10 ML/HR: at 15:55

## 2019-02-08 RX ADMIN — BUPIVACAINE HYDROCHLORIDE 10 ML: 2.5 INJECTION, SOLUTION EPIDURAL; INFILTRATION; INTRACAUDAL at 15:40

## 2019-02-08 RX ADMIN — NALBUPHINE HYDROCHLORIDE 10 MG: 10 INJECTION, SOLUTION INTRAMUSCULAR; INTRAVENOUS; SUBCUTANEOUS at 14:02

## 2019-02-08 RX ADMIN — PENICILLIN G POTASSIUM 2.5 MILLION UNITS: 20000000 POWDER, FOR SOLUTION INTRAVENOUS at 16:34

## 2019-02-08 RX ADMIN — EPHEDRINE SULFATE 10 MG: 50 INJECTION, SOLUTION INTRAVENOUS at 15:50

## 2019-02-08 RX ADMIN — IBUPROFEN 800 MG: 400 TABLET ORAL at 23:04

## 2019-02-08 RX ADMIN — BUPIVACAINE HYDROCHLORIDE 150 MG: 5 INJECTION, SOLUTION EPIDURAL; INTRACAUDAL at 15:40

## 2019-02-08 RX ADMIN — SODIUM CHLORIDE 5 MILLION UNITS: 900 INJECTION, SOLUTION INTRAVENOUS at 08:12

## 2019-02-08 RX ADMIN — SODIUM CHLORIDE, SODIUM LACTATE, POTASSIUM CHLORIDE, AND CALCIUM CHLORIDE 125 ML/HR: 600; 310; 30; 20 INJECTION, SOLUTION INTRAVENOUS at 07:10

## 2019-02-08 RX ADMIN — OXYTOCIN-SODIUM CHLORIDE 0.9% IV SOLN 30 UNIT/500ML 2 MILLI-UNITS/MIN: 30-0.9/5 SOLUTION at 07:13

## 2019-02-08 RX ADMIN — EPHEDRINE SULFATE 10 MG: 50 INJECTION, SOLUTION INTRAVENOUS at 16:54

## 2019-02-08 RX ADMIN — PENICILLIN G POTASSIUM 2.5 MILLION UNITS: 20000000 POWDER, FOR SOLUTION INTRAVENOUS at 12:20

## 2019-02-08 RX ADMIN — FENTANYL CITRATE 100 MCG: 50 INJECTION, SOLUTION INTRAMUSCULAR; INTRAVENOUS at 15:40

## 2019-02-08 RX ADMIN — ZOLPIDEM TARTRATE 5 MG: 5 TABLET ORAL at 00:18

## 2019-02-08 NOTE — ANESTHESIA PROCEDURE NOTES
Epidural Block Performed by: Rodriguez Otero MD 
Authorized by: Mj Dewitt DO  
 
Pre-Procedure Indication: labor epidural   
Preanesthetic Checklist: risks and benefits discussed and timeout performed Epidural:  
Patient position:  Seated Prep region:  Lumbar Prep: Chlorhexidine Location:  L2-3 Needle and Epidural Catheter:  
Needle Type:  Tuohy Needle Gauge:  17 G Injection Technique:  Loss of resistance using air Attempts:  1 Catheter Size:  19 G Catheter at Skin Depth (cm):  12 Depth in Epidural Space (cm):  5 Events: no blood with aspiration, no cerebrospinal fluid with aspiration, no paresthesia and negative aspiration test   
Test Dose:  Bupivacaine 0.25% and negative Assessment:  
Catheter Secured:  Tegaderm and tape Insertion:  Uncomplicated Patient tolerance:  Patient tolerated the procedure well with no immediate complications

## 2019-02-08 NOTE — ANESTHESIA POSTPROCEDURE EVALUATION
* No procedures listed *. Anesthesia Post Evaluation Patient location during evaluation: PACU Patient participation: complete - patient participated Level of consciousness: awake Pain management: adequate Airway patency: patent Anesthetic complications: no 
Cardiovascular status: hemodynamically stable Respiratory status: acceptable Hydration status: acceptable Comments: I have seen and evaluated the patient. The patient is ready for PACU discharge. [address], DO Visit Vitals /53 Pulse (!) 120 Temp 36.5 °C (97.7 °F) Resp 16 Ht 5' 5\" (1.651 m) Wt 127 kg (280 lb) SpO2 99% Breastfeeding? No  
BMI 46.59 kg/m²

## 2019-02-08 NOTE — PROGRESS NOTES
1158- VIGNESH Plata at bedside to complete SVE and possible AROM 
1201- SVE unchanged very posterior, Almaz Richards recommending pt to get up and move more and be on birthing ball. Attempted to complete AROM, unsuccessful due to cervix continuing to be posterior. 1209- Post void, small trickle of clear fluid 1210- Large flow of clear fluid all over floor and pt, cleaned up and tried to get more situated to be up on birthing ball; Dominic Angel CNM aware of SROM at this time 1220- S. You back from lunch and taking back over care of pt

## 2019-02-08 NOTE — PROGRESS NOTES
1535: Bedside shift change report given to Renita Rapp RN (oncoming nurse) by Harman Randolph RN (offgoing nurse). Report included the following information SBAR, MAR and Recent Results. 1550: Ephedrine given by Harman Randolph RN 
 
1845: Call placed to anesthesia for second dose of ephedrine. 1638: Dr. Carrie Hook at the bedside assessing patient and  [address]. SVE 8/100/+1. 
 
1727: Patient feeling a strong urge to push. Dr. Carrie Hook at the bedside assessing patient. SVE 10/100/+1. Will begin pushing. 1800: Nurse continuously at the bedside pushing with patient, , occasional variable noted with contractions that resolves spontaneously. 1809: KIWI applied 181: KIWI removed with 0 pop offs. :  of viable male infant by Dr. Carrie Hook. Baby placed skin to skin immediately. Apgars 9, 9. 
 
: Patient up to bedside commode. Patient voided a large amount without difficulty. Check void by 0215 
 
2019: 30 minute called to MIU 
 
2105: TRANSFER - OUT REPORT: 
 
Verbal report given to Lupe Medrano RN (name) on [employer]  being transferred to MIU (unit) for routine progression of care Report consisted of patients Situation, Background, Assessment and  
Recommendations(SBAR). Information from the following report(s) SBAR, MAR and Recent Results was reviewed with the receiving nurse. Lines:  
Peripheral IV 19 Left Hand (Active) Site Assessment Clean, dry, & intact 2019  6:56 PM  
Phlebitis Assessment 0 2019  6:56 PM  
Infiltration Assessment 0 2019  6:56 PM  
Dressing Status Clean, dry, & intact 2019  6:56 PM  
Dressing Type Tape;Transparent 2019  6:56 PM  
Hub Color/Line Status Pink; Infusing 2019  3:59 PM  
Action Taken Blood drawn 2019  5:47 PM  
Alcohol Cap Used Yes 2019  5:47 PM  
  
 
Opportunity for questions and clarification was provided. Patient transported with: 
 Registered Nurse

## 2019-02-08 NOTE — PROGRESS NOTES
Labor Progress Note Patient seen, fetal heart rate and contraction pattern evaluated, patient examined. Pt doing well this morning. She was able to get some sleep overnight with ambien. She has no c/o this morning. Physical Exam: 
Cervical Exam:  2 cm dilated 70% effaced   
-2 station Presenting Part: cephalic Cervical Position: posterior Membranes:  Unable to AROM at this time d/t cervical position, pt tolerance. Uterine Activity: Cx q3-4 min. Mild intensity Fetal Heart Rate: Reactive EFW 9.5lb Assessment/Plan: 
Cervidil removed this morning and pitocin infusion begun. Will return to AROM after pitocin has been going a little longer. Continue plan for vaginal delivery, epidural when desires. GBS pos. Continue abx.

## 2019-02-08 NOTE — ANESTHESIA PREPROCEDURE EVALUATION
Anesthetic History No history of anesthetic complications Review of Systems / Medical History Patient summary reviewed, nursing notes reviewed and pertinent labs reviewed Pulmonary Comments: Former Smoker Neuro/Psych Headaches (migraines) and psychiatric history Cardiovascular Within defined limits Exercise tolerance: >4 METS Comments: Denies hrt probs, chest pain Takes inderal for headaches not BP  
GI/Hepatic/Renal 
  
GERD Endo/Other Within defined limits Other Findings Comments: Hx Bilateral ovarian cysts Lupus-  On no meds Chronic sinus infection Physical Exam 
 
Airway Mallampati: II 
TM Distance: 4 - 6 cm Neck ROM: normal range of motion Mouth opening: Normal 
 
 Cardiovascular Regular rate and rhythm,  S1 and S2 normal,  no murmur, click, rub, or gallop Rhythm: regular Rate: normal 
 
 
 
 Dental 
No notable dental hx Pulmonary Breath sounds clear to auscultation Abdominal 
GI exam deferred Other Findings Anesthetic Plan ASA: 3 Anesthesia type: epidural 
 
 
 
 
Induction: Intravenous Anesthetic plan and risks discussed with: Patient and Mother

## 2019-02-08 NOTE — PROGRESS NOTES
1930: Bedside and Verbal shift change report given to CATIE Jaramillo RN (oncoming nurse) by Juancarlos Payton. Abelardo Herzog RN (offgoing nurse). Report included the following information SBAR, Intake/Output, MAR and Recent Results. 0010: Pt unable to rest, asking if there is anything else she can have for sleep aid. Spoke with Dr. Diamond Tamez, telephone read back order received for an additional 5mg of Ambien. 8121-0826: Pt sleeping off and on, states she is not feeling any contractions. 0600: Pt resting, instructed on how to remove cervidil, up to bathroom, cervidil removed by pt and discarded. Up to shower. 8741: Pitocin started. 0730: Bedside and Verbal shift change report given to S. Marylynn Soulier (oncoming nurse) by [employer]. Bhavani Jaramillo RN (offgoing nurse). Report included the following information SBAR, Intake/Output, MAR and Recent Results.

## 2019-02-08 NOTE — PROGRESS NOTES
0730: VIGNESH Jiang at [facility]. SVE 2/70/-2. Attempt to AROM unsuccessful. All questions addressed at this time. 9774: Bedside and Verbal shift change report given to GAVIN Torrez RN (oncoming nurse) by [employer]. Deon Colin RN (offgoing nurse). Report included the following information SBAR, Kardex, Procedure Summary, MAR, Recent Results and Med Rec Status. 0612-9030: Patient being cared for by JOSE R Leyva RN, brief report given for lunch break. 1358: Patient requesting cervical check, deciding if she wants to get an epidural based on whether or not she has made change. SVE 2/70/-2. Patient encouraged to change positions. Patient given education on nitrous oxide and IV Nubain. Patient requesting IV Nubain. 1545: Bedside and Verbal shift change report given to SEEMA Desai RN (oncoming nurse) by Kd Coronado. Juan M Torrez RN (offgoing nurse). Report included the following information SBAR, Procedure Summary, Intake/Output, MAR, Recent Results and Med Rec Status.

## 2019-02-08 NOTE — PROGRESS NOTES
Labor Progress Note Patient seen, fetal heart rate and contraction pattern evaluated, patient examined. Pt coping well with cx Physical Exam: 
Cervical Exam:  2 cm dilated 70% effaced   
-2 station Cervical Position:  Very posterior behind fetal head. Membranes:  AROM, clear fluid Uterine Activity: cx q1.5-3 min. Moderate intensity Fetal Heart Rate: baseline 140, positive accels 15x15, no decels. Assessment/Plan: 
Reassuring fetal status, Labor  Progressing normally Continue expectant management, Continue plan for vaginal delivery Pt encouraged to be up out of bed, using exercise ball and ambulating to allow gravity to work. Epidural when she is ready.

## 2019-02-08 NOTE — PROCEDURES
VAVD of a viable male infant over an intact perineum in OA position. No nuchal cord. No shoulder dystocia. Vacuum applied at +2 station, MALATHI position. Delivered through 2 contractions. Placenta del spont, intact with 3vc. EBL 300ml. 1st degree PV laceration repaired with 3-0 vicryl.

## 2019-02-09 PROCEDURE — 74011250637 HC RX REV CODE- 250/637: Performed by: SPECIALIST

## 2019-02-09 PROCEDURE — 65410000002 HC RM PRIVATE OB

## 2019-02-09 RX ORDER — SERTRALINE HYDROCHLORIDE 50 MG/1
50 TABLET, FILM COATED ORAL DAILY
Status: DISCONTINUED | OUTPATIENT
Start: 2019-02-09 | End: 2019-02-09

## 2019-02-09 RX ORDER — FUROSEMIDE 40 MG/1
20 TABLET ORAL DAILY
Status: DISCONTINUED | OUTPATIENT
Start: 2019-02-09 | End: 2019-02-10 | Stop reason: HOSPADM

## 2019-02-09 RX ORDER — LORAZEPAM 1 MG/1
0.5 TABLET ORAL
Status: DISCONTINUED | OUTPATIENT
Start: 2019-02-09 | End: 2019-02-10 | Stop reason: HOSPADM

## 2019-02-09 RX ADMIN — IBUPROFEN 800 MG: 400 TABLET ORAL at 17:04

## 2019-02-09 RX ADMIN — IBUPROFEN 800 MG: 400 TABLET ORAL at 09:16

## 2019-02-09 RX ADMIN — LORAZEPAM 0.5 MG: 1 TABLET ORAL at 03:14

## 2019-02-09 RX ADMIN — LORAZEPAM 0.5 MG: 1 TABLET ORAL at 20:40

## 2019-02-09 RX ADMIN — FUROSEMIDE 20 MG: 40 TABLET ORAL at 21:34

## 2019-02-09 NOTE — PROGRESS NOTES
Post-Partum Day Number 1 Progress Note [employer] Information for the patient's :  Harsha Platt [[id number]] Vaginal, Vacuum (Extractor) Patient doing well without significant complaint. Voiding without difficulty, normal lochia. Vitals:   
Visit Vitals /78 (BP 1 Location: Left arm) Pulse 77 Temp 97.8 °F (36.6 °C) Resp 18 Ht 5' 5\" (1.651 m) Wt 127 kg (280 lb) SpO2 99% Breastfeeding? No  
BMI 46.59 kg/m² Temp (24hrs), Av.6 °F (36.4 °C), Min:96.8 °F (36 °C), Max:97.8 °F (36.6 °C) Exam:  Patient without distress. Abdomen soft, fundus firm, nontender Perineum with normal lochia noted. Lower extremities are negative for swelling, cords or tenderness. Labs:  
 
Lab Results Component Value Date/Time  WBC 11.2 (H) 2019 05:51 PM  
 WBC 12.9 (H) 01/10/2019 01:45 AM  
 WBC 12.6 (H) 2018 12:55 PM  
 WBC 4.7 2018 04:22 AM  
 WBC 11.5 (H) 2018 07:37 AM  
 WBC 8.6 2016 09:20 PM  
 WBC 8.7 2015 11:43 PM  
 WBC 7.3 2011 12:15 PM  
 WBC 11.8 (H) 2010 11:42 AM  
 WBC 14.3 (H) 2010 09:20 PM  
 WBC 11.1 (H) 2010 05:10 AM  
 WBC 14.9 (H) 2010 05:00 PM  
 WBC 7.0 2010 01:26 PM  
 WBC 10.0 2010 04:47 PM  
 HGB 12.4 2019 05:51 PM  
 HGB 11.9 01/10/2019 01:45 AM  
 HGB 11.4 (L) 2018 12:55 PM  
 HGB 11.7 2018 04:22 AM  
 HGB 14.3 2018 07:37 AM  
 HGB 13.4 2016 09:20 PM  
 HGB 12.8 2015 11:43 PM  
 HGB 13.2 2011 12:15 PM  
 HGB 15.8 2010 11:42 AM  
 HGB 9.7 (L) 2010 09:20 PM  
 HGB 9.2 (L) 2010 05:10 AM  
 HGB 10.9 (L) 2010 05:00 PM  
 HGB 12.3 2010 01:26 PM  
 HGB 12.0 2010 04:47 PM  
 HCT 37.1 2019 05:51 PM  
 HCT 37.1 01/10/2019 01:45 AM  
 HCT 34.9 (L) 2018 12:55 PM  
 HCT 36.3 2018 04:22 AM  
 HCT 43.5 2018 07:37 AM  
 HCT 39.1 07/17/2016 09:20 PM  
 HCT 38.6 08/22/2015 11:43 PM  
 HCT 40.5 02/01/2011 12:15 PM  
 HCT 45.9 12/27/2010 11:42 AM  
 HCT 28.2 (L) 09/09/2010 09:20 PM  
 HCT 27.4 (L) 09/08/2010 05:10 AM  
 HCT 31.5 (L) 09/07/2010 05:00 PM  
 HCT 36.0 09/06/2010 01:26 PM  
 HCT 34.3 (L) 07/11/2010 04:47 PM  
 PLATELET 359 05/73/9207 05:51 PM  
 PLATELET 053 77/61/4931 01:45 AM  
 PLATELET 242 18/38/9887 12:55 PM  
 PLATELET 047 93/85/1815 04:22 AM  
 PLATELET 424 55/14/0953 07:37 AM  
 PLATELET 646 05/85/5488 09:20 PM  
 PLATELET 744 80/65/2618 11:43 PM  
 PLATELET 244 43/44/3010 12:15 PM  
 PLATELET 968 91/25/3006 11:42 AM  
 PLATELET 642 23/98/8850 09:20 PM  
 PLATELET 167 (L) 26/05/1772 05:10 AM  
 PLATELET 784 (L) 49/14/7718 05:00 PM  
 PLATELET 422 17/49/4678 01:26 PM  
 PLATELET 558 71/16/6140 04:47 PM  
 
 
No results found for this or any previous visit (from the past 24 hour(s)). Assessment: Doing well, post partum day 1 Plan: 1.  Continue routine postpartum and perineal care as well as maternal

## 2019-02-09 NOTE — ROUTINE PROCESS
TRANSFER - IN REPORT: 
 
Verbal report received from SEEMA Desai RN(name) on [employer]  being received from L&D(unit) for routine progression of care Report consisted of patients Situation, Background, Assessment and  
Recommendations(SBAR). Information from the following report(s) SBAR was reviewed with the receiving nurse. Opportunity for questions and clarification was provided. Assessment completed upon patients arrival to unit and care assumed.
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
## 2019-02-10 VITALS
OXYGEN SATURATION: 99 % | BODY MASS INDEX: 46.65 KG/M2 | DIASTOLIC BLOOD PRESSURE: 83 MMHG | WEIGHT: 280 LBS | HEART RATE: 91 BPM | TEMPERATURE: 97.9 F | SYSTOLIC BLOOD PRESSURE: 119 MMHG | HEIGHT: 65 IN | RESPIRATION RATE: 16 BRPM

## 2019-02-10 PROCEDURE — 90686 IIV4 VACC NO PRSV 0.5 ML IM: CPT | Performed by: SPECIALIST

## 2019-02-10 PROCEDURE — 74011250636 HC RX REV CODE- 250/636: Performed by: SPECIALIST

## 2019-02-10 PROCEDURE — 77030018846 HC SOL IRR STRL H20 ICUM -A

## 2019-02-10 PROCEDURE — 90471 IMMUNIZATION ADMIN: CPT

## 2019-02-10 PROCEDURE — 74011250637 HC RX REV CODE- 250/637: Performed by: SPECIALIST

## 2019-02-10 RX ORDER — LORAZEPAM 0.5 MG/1
0.5 TABLET ORAL
Qty: 5 TAB | Refills: 0 | Status: SHIPPED | OUTPATIENT
Start: 2019-02-10 | End: 2020-09-21

## 2019-02-10 RX ORDER — VENLAFAXINE HYDROCHLORIDE 37.5 MG/1
37.5 CAPSULE, EXTENDED RELEASE ORAL DAILY
Qty: 30 CAP | Refills: 0 | Status: SHIPPED | OUTPATIENT
Start: 2019-02-10 | End: 2020-11-23 | Stop reason: CLARIF

## 2019-02-10 RX ORDER — IBUPROFEN 200 MG
800 TABLET ORAL
Qty: 30 TAB | Refills: 0 | Status: SHIPPED | OUTPATIENT
Start: 2019-02-10

## 2019-02-10 RX ADMIN — INFLUENZA VIRUS VACCINE 0.5 ML: 15; 15; 15; 15 SUSPENSION INTRAMUSCULAR at 13:40

## 2019-02-10 RX ADMIN — IBUPROFEN 800 MG: 400 TABLET ORAL at 01:43

## 2019-02-10 RX ADMIN — IBUPROFEN 800 MG: 400 TABLET ORAL at 10:53

## 2019-02-10 NOTE — ROUTINE PROCESS
Bedside shift change report given to ANUPAM Mishra (oncoming nurse) by Pamela Kwok RN (offgoing nurse). Report included the following information SBAR.  
 
1345-Pt discharged home with family. Discharge instructions and medication administration times reviewed. Pt verbalized understanding.

## 2019-02-10 NOTE — ROUTINE PROCESS
Bedside and Verbal shift change report given to ELLYN Carl (oncoming nurse) by Tip King RN (offgoing nurse). Report included the following information SBAR.  
 
2050 Pt crying and asking to get Ativan, stating she is having post partum depression. Requesting to have 1 mg Ativan instead of the 0.5 mg prescribed. Also requesting a fluid pill because her feet and lower legs are swollen. Dr. Kim Raman on call and contacted. Orders given for Lasix 20 mg po now and every day, and Zoloft 50 mg po now and every day. Patient informed. Will give meds when available. Patient also given the prescribed Ativan 0.5 mg po. 
 
2115 Pt reports she cannot take Zoloft because it makes her anxious. Reports she has taken Effexor in the past.  Dr Kim Raman contacted and ordered Effexor (previous dose patient was taking). Pt not sure of previous dose, thinks it is 37.5 mg every day, but prescription bottle from 2016 reads 75 mg every day. Pt then decided she would not want to take the medicine tonight, but in the morning. Advised patient to discuss medication and dose when her doctor arrived in the AM.  Pt agreeable. 2145 Pt reports she is feeling calmer after taking Ativan. Family at bedside.

## 2019-02-10 NOTE — PROGRESS NOTES
Post-Partum Day Number 2 Progress Note [employer] Information for the patient's :  [facility] [[id number]] Vaginal, Vacuum (Extractor) Patient doing well without significant complaint. Voiding without difficulty, normal lochia. Vitals:   
Visit Vitals /51 (BP 1 Location: Left arm, BP Patient Position: At rest;Supine) Pulse 75 Temp 97.9 °F (36.6 °C) Resp 18 Ht 5' 5\" (1.651 m) Wt 127 kg (280 lb) SpO2 99% Breastfeeding? No  
BMI 46.59 kg/m² Temp (24hrs), Av °F (36.7 °C), Min:97.8 °F (36.6 °C), Max:98.2 °F (36.8 °C) Exam:    
    Patient without distress. Abdomen soft, fundus firm, nontender Lower extremities are negative for swelling, cords or tenderness. Labs:  
 
Lab Results Component Value Date/Time  WBC 11.2 (H) 2019 05:51 PM  
 WBC 12.9 (H) 01/10/2019 01:45 AM  
 WBC 12.6 (H) 2018 12:55 PM  
 WBC 4.7 2018 04:22 AM  
 WBC 11.5 (H) 2018 07:37 AM  
 WBC 8.6 2016 09:20 PM  
 WBC 8.7 2015 11:43 PM  
 WBC 7.3 2011 12:15 PM  
 WBC 11.8 (H) 2010 11:42 AM  
 WBC 14.3 (H) 2010 09:20 PM  
 WBC 11.1 (H) 2010 05:10 AM  
 WBC 14.9 (H) 2010 05:00 PM  
 WBC 7.0 2010 01:26 PM  
 WBC 10.0 2010 04:47 PM  
 HGB 12.4 2019 05:51 PM  
 HGB 11.9 01/10/2019 01:45 AM  
 HGB 11.4 (L) 2018 12:55 PM  
 HGB 11.7 2018 04:22 AM  
 HGB 14.3 2018 07:37 AM  
 HGB 13.4 2016 09:20 PM  
 HGB 12.8 2015 11:43 PM  
 HGB 13.2 2011 12:15 PM  
 HGB 15.8 2010 11:42 AM  
 HGB 9.7 (L) 2010 09:20 PM  
 HGB 9.2 (L) 2010 05:10 AM  
 HGB 10.9 (L) 2010 05:00 PM  
 HGB 12.3 2010 01:26 PM  
 HGB 12.0 2010 04:47 PM  
 HCT 37.1 2019 05:51 PM  
 HCT 37.1 01/10/2019 01:45 AM  
 HCT 34.9 (L) 2018 12:55 PM  
 HCT 36.3 2018 04:22 AM  
 HCT 43.5 2018 07:37 AM  
 HCT 39.1 2016 09:20 PM  
 HCT 38.6 08/22/2015 11:43 PM  
 HCT 40.5 02/01/2011 12:15 PM  
 HCT 45.9 12/27/2010 11:42 AM  
 HCT 28.2 (L) 09/09/2010 09:20 PM  
 HCT 27.4 (L) 09/08/2010 05:10 AM  
 HCT 31.5 (L) 09/07/2010 05:00 PM  
 HCT 36.0 09/06/2010 01:26 PM  
 HCT 34.3 (L) 07/11/2010 04:47 PM  
 PLATELET 837 58/51/6828 05:51 PM  
 PLATELET 951 12/94/2633 01:45 AM  
 PLATELET 658 96/90/9480 12:55 PM  
 PLATELET 174 72/64/4727 04:22 AM  
 PLATELET 584 41/14/8609 07:37 AM  
 PLATELET 143 54/08/1599 09:20 PM  
 PLATELET 315 83/74/5387 11:43 PM  
 PLATELET 446 90/51/4221 12:15 PM  
 PLATELET 778 07/06/4977 11:42 AM  
 PLATELET 105 03/39/9271 09:20 PM  
 PLATELET 680 (L) 31/42/3633 05:10 AM  
 PLATELET 421 (L) 00/19/5654 05:00 PM  
 PLATELET 774 38/66/5650 01:26 PM  
 PLATELET 878 99/97/7309 04:47 PM  
 
 
No results found for this or any previous visit (from the past 24 hour(s)). Assessment: Doing well, post partum day 2 Plan: 1. Discharge home today 2. Follow up in office in 1 weeks with Subhash Price MD 
3. Post partum activity advised, diet as tolerated 4. Discharge Medications: ibuprofen, percocet and medications prior to admission

## 2019-02-10 NOTE — DISCHARGE INSTRUCTIONS
POST DELIVERY DISCHARGE INSTRUCTIONS    Name: Jarrod Mendoza  YOB: 1985  Primary Diagnosis: Active Problems:    Encounter for elective induction of labor (2019)        General:     Diet/Diet Restrictions:  Eight 8-ounce glasses of fluid daily (water, juices); avoid excessive caffeine intake. Meals/snacks as desired which are high in fiber and carbohydrates and low in fat and cholesterol. Physical Activity / Restrictions / Safety:     Avoid heavy lifting, no more that 8 lbs. For 2-3 weeks; limit use of stairs to 2 times daily for the first week home. No driving for one week. Avoid intercourse 4-6 weeks, no douching or tampon use. Check with obstetrician before starting or resuming an exercise program.         Discharge Instructions/Special Treatment/Home Care Needs:     Continue prenatal vitamins. Continue to use squirt bottle with warm water on your episiotomy after each bathroom use until bleeding stops. If steri-strips applied to your incision, remove in 7-10 days. Call your doctor for the following:     Fever over 101 degrees by mouth. Vaginal bleeding heavier than a normal menstrual period or clot larger than a golf ball. Red streaks or increased swelling of legs, painful red streaks on your breast.  Painful urination, constipation and increased pain or swelling or discharge with your incision. If you feel extremely anxious or overwhelmed. If you have thoughts of harming yourself and/or your baby. Pain Management:     Pain Management:   Take Acetaminophen (Tylenol) or Ibuprofen (Advil, Motrin), as directed for pain. Use a warm Sitz bath 3 times daily to relieve episiotomy or hemorrhoidal discomfort. Heating pad to  incision as needed. For hemorrhoidal discomfort, use Tucks and Anusol cream as needed and directed. Follow-Up Care:      These are general instructions for a healthy lifestyle:    No smoking/ No tobacco products/ Avoid exposure to second hand smoke    Surgeon General's Warning:  Quitting smoking now greatly reduces serious risk to your health. Obesity, smoking, and sedentary lifestyle greatly increases your risk for illness    A healthy diet, regular physical exercise & weight monitoring are important for maintaining a healthy lifestyle    Recognize signs and symptoms of STROKE:    F-face looks uneven    A-arms unable to move or move unevenly    S-speech slurred or non-existent    T-time-call 911 as soon as signs and symptoms begin-DO NOT go       Back to bed or wait to see if you get better-TIME IS BRAIN. Patient Education        Depression After Childbirth: Care Instructions  Your Care Instructions    Many women get the \"baby blues\" during the first few days after childbirth. You may lose sleep, feel irritable, and cry easily. You may feel happy one minute and sad the next. Hormone changes are one cause of these emotional changes. Also, the demands of a new baby, along with visits from relatives or other family needs, add to a mother's stress. The \"baby blues\" often peak around the fourth day. Then they ease up in less than 2 weeks. If your moodiness or anxiety lasts for more than 2 weeks, or if you feel like life is not worth living, you may have postpartum depression. This is different for each mother. Some mothers with serious depression may worry intensely about their infant's well-being. Others may feel distant from their child. Some mothers might even feel that they might harm their baby. A mother may have signs of paranoia, wondering if someone is watching her. Depression is not a sign of weakness. It is a medical condition that requires treatment. Medicine and counseling often work well to reduce depression. Talk to your doctor about taking antidepressant medicine while breastfeeding. Follow-up care is a key part of your treatment and safety. Be sure to make and go to all appointments, and call your doctor if you are having problems. It's also a good idea to know your test results and keep a list of the medicines you take. How do you know if you are depressed? With all the changes in your life, you may not know if you are depressed. Pregnancy sometimes causes changes in how you feel that are similar to the symptoms of depression. Symptoms of depression include:  · Feeling sad or hopeless and losing interest in daily activities. These are the most common symptoms of depression. · Sleeping too much or not enough. · Feeling tired. You may feel as if you have no energy. · Eating too much or too little. · Writing or talking about death, such as writing suicide notes or talking about guns, knives, or pills. Keep the numbers for these national suicide hotlines: 4-808-877-TALK (8-469.986.7686) and 6-888-OTYCJEU (5-731.746.4126). If you or someone you know talks about suicide or feeling hopeless, get help right away. How can you care for yourself at home? · Be safe with medicines. Take your medicines exactly as prescribed. Call your doctor if you think you are having a problem with your medicine. · Eat a healthy diet so that you can keep up your energy. · Get regular daily exercise, such as walks, to help improve your mood. · Get as much sunlight as possible. Keep your shades and curtains open. Get outside as much as you can. · Avoid using alcohol or other substances to feel better. · Get as much rest and sleep as possible. Avoid doing too much. Being too tired can increase depression. · Play stimulating music throughout your day and soothing music at night. · Schedule outings and visits with friends and family. Ask them to call you regularly, so that you do not feel alone. · Ask for help with preparing food and other daily tasks. Family and friends are often happy to help a mother with a . · Be honest with yourself and those who care about you. Tell them about your struggle. · Join a support group of new mothers.  No one can better understand the challenges of caring for a  than other new mothers. · If you feel like life is not worth living or are feeling hopeless, get help right away. Keep the numbers for these national suicide hotlines: 9-241-217-TALK (7-344.363.2112) and 0-618-HTATKIR (4-639.158.8406). When should you call for help? Call 911 anytime you think you may need emergency care. For example, call if:    · You feel you cannot stop from hurting yourself, your baby, or someone else.   [facility] your doctor now or seek immediate medical care if:    · You are having trouble caring for yourself or your baby.     · You hear voices.   Le Angela closely for changes in your health, and be sure to contact your doctor if:    · You have problems with your depression medicine.     · You do not get better as expected. Where can you learn more? Go to http://jose-jacek.info/. Enter A792 in the search box to learn more about \"Depression After Childbirth: Care Instructions. \"  Current as of: 2018  Content Version: 11.9  © 1100-4382 [employer]. Care instructions adapted under license by [employer] (which disclaims liability or warranty for this information). If you have questions about a medical condition or this instruction, always ask your healthcare professional. [address] any warranty or liability for your use of this information.                Signed By: Ginny Meyer RN                                                                                                   Date: 2/10/2019 Time: 1:46 PM

## 2019-02-24 ENCOUNTER — HOSPITAL ENCOUNTER (EMERGENCY)
Age: 34
Discharge: HOME | End: 2019-02-25
Attending: EMERGENCY MEDICINE
Payer: MEDICAID

## 2019-02-24 DIAGNOSIS — N30.01 ACUTE CYSTITIS WITH HEMATURIA: Primary | ICD-10-CM

## 2019-02-24 LAB
COMMENT, HOLDF: NORMAL
SAMPLES BEING HELD,HOLD: NORMAL
UR CULT HOLD, URHOLD: NORMAL

## 2019-02-24 PROCEDURE — 81001 URINALYSIS AUTO W/SCOPE: CPT

## 2019-02-24 PROCEDURE — 87186 SC STD MICRODIL/AGAR DIL: CPT

## 2019-02-24 PROCEDURE — 36415 COLL VENOUS BLD VENIPUNCTURE: CPT

## 2019-02-24 PROCEDURE — 87077 CULTURE AEROBIC IDENTIFY: CPT

## 2019-02-24 PROCEDURE — 99283 EMERGENCY DEPT VISIT LOW MDM: CPT

## 2019-02-24 PROCEDURE — 87086 URINE CULTURE/COLONY COUNT: CPT

## 2019-02-24 PROCEDURE — 80053 COMPREHEN METABOLIC PANEL: CPT

## 2019-02-24 PROCEDURE — 85025 COMPLETE CBC W/AUTO DIFF WBC: CPT

## 2019-02-24 RX ORDER — SODIUM CHLORIDE 0.9 % (FLUSH) 0.9 %
5-40 SYRINGE (ML) INJECTION AS NEEDED
Status: DISCONTINUED | OUTPATIENT
Start: 2019-02-24 | End: 2019-02-25 | Stop reason: HOSPADM

## 2019-02-24 RX ORDER — KETOROLAC TROMETHAMINE 30 MG/ML
30 INJECTION, SOLUTION INTRAMUSCULAR; INTRAVENOUS
Status: COMPLETED | OUTPATIENT
Start: 2019-02-24 | End: 2019-02-25

## 2019-02-24 RX ORDER — SODIUM CHLORIDE 0.9 % (FLUSH) 0.9 %
5-40 SYRINGE (ML) INJECTION EVERY 8 HOURS
Status: DISCONTINUED | OUTPATIENT
Start: 2019-02-24 | End: 2019-02-25 | Stop reason: HOSPADM

## 2019-02-25 VITALS
HEIGHT: 65 IN | BODY MASS INDEX: 40.82 KG/M2 | SYSTOLIC BLOOD PRESSURE: 117 MMHG | OXYGEN SATURATION: 98 % | TEMPERATURE: 98.2 F | DIASTOLIC BLOOD PRESSURE: 68 MMHG | RESPIRATION RATE: 17 BRPM | HEART RATE: 64 BPM | WEIGHT: 245 LBS

## 2019-02-25 LAB
ALBUMIN SERPL-MCNC: 3.2 G/DL (ref 3.5–5)
ALBUMIN/GLOB SERPL: 0.8 {RATIO} (ref 1.1–2.2)
ALP SERPL-CCNC: 97 U/L (ref 45–117)
ALT SERPL-CCNC: 36 U/L (ref 12–78)
ANION GAP SERPL CALC-SCNC: 11 MMOL/L (ref 5–15)
APPEARANCE UR: CLEAR
AST SERPL-CCNC: 24 U/L (ref 15–37)
BACTERIA URNS QL MICRO: NEGATIVE /HPF
BASOPHILS # BLD: 0.1 K/UL (ref 0–0.1)
BASOPHILS NFR BLD: 1 % (ref 0–1)
BILIRUB SERPL-MCNC: 0.2 MG/DL (ref 0.2–1)
BILIRUB UR QL: NEGATIVE
BUN SERPL-MCNC: 21 MG/DL (ref 6–20)
BUN/CREAT SERPL: 20 (ref 12–20)
CALCIUM SERPL-MCNC: 9.2 MG/DL (ref 8.5–10.1)
CHLORIDE SERPL-SCNC: 101 MMOL/L (ref 97–108)
CO2 SERPL-SCNC: 28 MMOL/L (ref 21–32)
COLOR UR: ABNORMAL
CREAT SERPL-MCNC: 1.06 MG/DL (ref 0.55–1.02)
DIFFERENTIAL METHOD BLD: ABNORMAL
EOSINOPHIL # BLD: 0.3 K/UL (ref 0–0.4)
EOSINOPHIL NFR BLD: 3 % (ref 0–7)
EPITH CASTS URNS QL MICRO: ABNORMAL /LPF
ERYTHROCYTE [DISTWIDTH] IN BLOOD BY AUTOMATED COUNT: 13.3 % (ref 11.5–14.5)
GLOBULIN SER CALC-MCNC: 4.2 G/DL (ref 2–4)
GLUCOSE SERPL-MCNC: 96 MG/DL (ref 65–100)
GLUCOSE UR STRIP.AUTO-MCNC: NEGATIVE MG/DL
HCT VFR BLD AUTO: 43.7 % (ref 35–47)
HGB BLD-MCNC: 14.3 G/DL (ref 11.5–16)
HGB UR QL STRIP: ABNORMAL
HYALINE CASTS URNS QL MICRO: ABNORMAL /LPF (ref 0–5)
IMM GRANULOCYTES # BLD AUTO: 0 K/UL (ref 0–0.04)
IMM GRANULOCYTES NFR BLD AUTO: 0 % (ref 0–0.5)
KETONES UR QL STRIP.AUTO: NEGATIVE MG/DL
LEUKOCYTE ESTERASE UR QL STRIP.AUTO: ABNORMAL
LYMPHOCYTES # BLD: 3.9 K/UL (ref 0.8–3.5)
LYMPHOCYTES NFR BLD: 37 % (ref 12–49)
MCH RBC QN AUTO: 28.8 PG (ref 26–34)
MCHC RBC AUTO-ENTMCNC: 32.7 G/DL (ref 30–36.5)
MCV RBC AUTO: 87.9 FL (ref 80–99)
MONOCYTES # BLD: 0.7 K/UL (ref 0–1)
MONOCYTES NFR BLD: 7 % (ref 5–13)
NEUTS SEG # BLD: 5.4 K/UL (ref 1.8–8)
NEUTS SEG NFR BLD: 52 % (ref 32–75)
NITRITE UR QL STRIP.AUTO: NEGATIVE
NRBC # BLD: 0 K/UL (ref 0–0.01)
NRBC BLD-RTO: 0 PER 100 WBC
PH UR STRIP: 5.5 [PH] (ref 5–8)
PLATELET # BLD AUTO: 216 K/UL (ref 150–400)
PMV BLD AUTO: 10.2 FL (ref 8.9–12.9)
POTASSIUM SERPL-SCNC: 3.3 MMOL/L (ref 3.5–5.1)
PROT SERPL-MCNC: 7.4 G/DL (ref 6.4–8.2)
PROT UR STRIP-MCNC: NEGATIVE MG/DL
RBC # BLD AUTO: 4.97 M/UL (ref 3.8–5.2)
RBC #/AREA URNS HPF: ABNORMAL /HPF (ref 0–5)
SODIUM SERPL-SCNC: 140 MMOL/L (ref 136–145)
SP GR UR REFRACTOMETRY: 1.02 (ref 1–1.03)
UROBILINOGEN UR QL STRIP.AUTO: 0.2 EU/DL (ref 0.2–1)
WBC # BLD AUTO: 10.4 K/UL (ref 3.6–11)
WBC URNS QL MICRO: ABNORMAL /HPF (ref 0–4)

## 2019-02-25 PROCEDURE — 74011000250 HC RX REV CODE- 250: Performed by: EMERGENCY MEDICINE

## 2019-02-25 PROCEDURE — 74011250636 HC RX REV CODE- 250/636: Performed by: EMERGENCY MEDICINE

## 2019-02-25 PROCEDURE — 96361 HYDRATE IV INFUSION ADD-ON: CPT

## 2019-02-25 PROCEDURE — 96374 THER/PROPH/DIAG INJ IV PUSH: CPT

## 2019-02-25 PROCEDURE — 96375 TX/PRO/DX INJ NEW DRUG ADDON: CPT

## 2019-02-25 RX ADMIN — Medication 10 ML: at 00:10

## 2019-02-25 RX ADMIN — KETOROLAC TROMETHAMINE 30 MG: 30 INJECTION INTRAMUSCULAR; INTRAVENOUS at 00:09

## 2019-02-25 RX ADMIN — SODIUM CHLORIDE 1000 ML: 900 INJECTION, SOLUTION INTRAVENOUS at 00:12

## 2019-02-25 RX ADMIN — CEFTRIAXONE SODIUM 1 G: 1 INJECTION, POWDER, FOR SOLUTION INTRAMUSCULAR; INTRAVENOUS at 00:56

## 2019-02-25 NOTE — ED TRIAGE NOTES
Pt. States had UTI prior to giving birth two weeks ago, pt. States had catheter placed during labor, was told has ecoli in bladder. Pt. Is on abx and but feels worse today. Pt. States started with nausea, bilateral flank pain. Vaginal birth strep b + no other complications.

## 2019-02-25 NOTE — ED PROVIDER NOTES
26 y.o. Female, A0 and approximately 2 weeks postpartum, past medical history significant for chronic headaches, Lupus, GERD, h/o preeclampsia with first pregnancy, presents from home via private vehicle with chief complaint of dysuria. Patient arrives to the ED with multiple complaints. She states that before giving birth on 19 she was treated with abx to treat Strep B infection and a UTI. Patient does not recall the name of the abx. She notes that 1 week after delivery she felt the onset of a delayed urge to urinate and defecate to the point where she would urinate and defecate on herself. Patient followed-up with her OBGYN who told her that she likely had a UTI due to \"dirty urine\" noticed in her urinary catheter while giving birth. She states that her OBGYN collected a urine sample, but the results did not come back for 8-11 days and then told that there was presence of E coli in her bladder. Patient notes that she was put on Keflex by her OBGYN 3 days ago, which she is still taking as directed. Despite taking the abx, patient complains of worsening dysuria, as well as fatigue, nausea, and intermittent back pain. She states that she took her tympanic temperature at home tonight and found that it was elevated to 99.5 °F. Patient reports that she has been experiencing mild headaches since giving birth, but notes they are always relieved with taking Advil. She also states that she has had some leg swelling after this most recent pregnancy, but the leg swelling has decreased with use of diuretics. Patient denies use of current antihypertensive agents other than the diuretic she is prescribed. There are no other acute medical concerns at this time. Social hx: former smoker; denies use of EtOH 
PCP: Edith Mirza MD 
OBGYN: Chidi Gonzalez MD 
 
Note written by Chula Ashley, as dictated by Nelson Gimenez,  11:37 PM 
 
 
 
 The history is provided by the patient. No  was used. Past Medical History:  
Diagnosis Date  Abnormal Papanicolaou smear of cervix HPV  Anemia  Bilateral ovarian cysts  Essential hypertension   
 preeclampsia with first pregnancy  GERD (gastroesophageal reflux disease)  Gestational hypertension   
 with first pregnancy  Lupus   
 diagnosed at 11years of age  Migraine  Neurological disorder   
 migraines  Other ill-defined conditions(799.89)   
 lupus  Postpartum depression   
 prescribed effexor  Psychiatric disorder   
 anxiety/depression- was taking ativan prior to pregnancy Past Surgical History:  
Procedure Laterality Date  COLONOSCOPY N/A 3/14/2017 COLONOSCOPY performed by Domonique Mayer MD at [facility] ENDOSCOPY  COLONOSCOPY,DIAGNOSTIC  3/14/2017  HX GI    
 LAPAROSCOPY  
 HX HEENT  14  
 sinus surgery  HX HERNIA REPAIR    
 BILATERAL INGUINAL HERNIA REPAIR  
 HX OTHER SURGICAL    
 hemmorhoid, hernia-groin as an infant  HX OTHER SURGICAL    
 sinus surgery Family History:  
Problem Relation Age of Onset  Anesth Problems Neg Hx Social History Socioeconomic History  Marital status: SINGLE Spouse name: Not on file  Number of children: Not on file  Years of education: Not on file  Highest education level: Not on file Social Needs  Financial resource strain: Not on file  Food insecurity - worry: Not on file  Food insecurity - inability: Not on file  Transportation needs - medical: Not on file  Transportation needs - non-medical: Not on file Occupational History  Not on file Tobacco Use  Smoking status: Former Smoker Years: 1.00 Last attempt to quit: 3/31/2014 Years since quittin.9  Smokeless tobacco: Never Used Substance and Sexual Activity  Alcohol use: No  
 Drug use: No  
 Sexual activity: Not on file Other Topics Concern [address] Service Not Asked  Blood Transfusions Not Asked  Caffeine Concern Not Asked  Occupational Exposure Not Asked [employer] Hazards Not Asked  Sleep Concern Not Asked  Stress Concern Not Asked  Weight Concern Not Asked  Special Diet Not Asked  Back Care Not Asked  Exercise Not Asked  Bike Helmet Not Asked  Seat Belt Not Asked  Self-Exams Not Asked Social History Narrative  Not on file ALLERGIES: Morphine; Compazine [prochlorperazine edisylate]; Percocet [oxycodone-acetaminophen]; Phenergan [promethazine]; Reglan [metoclopramide]; and Zofran [ondansetron hcl (pf)] Review of Systems Constitutional: Positive for fatigue and fever. Negative for appetite change, chills and unexpected weight change. HENT: Negative for ear pain, hearing loss, rhinorrhea and trouble swallowing. Eyes: Negative for pain and visual disturbance. Respiratory: Negative for cough, chest tightness and shortness of breath. Cardiovascular: Positive for leg swelling. Negative for chest pain and palpitations. Gastrointestinal: Positive for nausea. Negative for abdominal distention, abdominal pain and blood in stool. Genitourinary: Positive for dysuria. Negative for hematuria. Musculoskeletal: Positive for back pain. Negative for myalgias. Skin: Negative for rash. Neurological: Positive for headaches. Negative for dizziness, syncope, weakness and numbness. Psychiatric/Behavioral: Negative for confusion and suicidal ideas. All other systems reviewed and are negative. Vitals:  
 02/25/19 0000 02/25/19 0100 02/25/19 0130 02/25/19 0146 BP: 116/76 121/78 117/68 Pulse:  71 64 Resp:  12 17 Temp:    98.2 °F (36.8 °C) SpO2:  99% 98% Weight:      
Height:      
      
 
Physical Exam  
Constitutional: She is oriented to person, place, and time. She appears well-developed and well-nourished. No distress.   
HENT:  
 Head: Normocephalic and atraumatic. Right Ear: External ear normal.  
Left Ear: External ear normal.  
Nose: Nose normal.  
Mouth/Throat: Oropharynx is clear and moist. No oropharyngeal exudate. Eyes: Conjunctivae and EOM are normal. Pupils are equal, round, and reactive to light. Right eye exhibits no discharge. Left eye exhibits no discharge. No scleral icterus. Neck: Normal range of motion. Neck supple. No JVD present. No tracheal deviation present. Cardiovascular: Normal rate, regular rhythm, normal heart sounds and intact distal pulses. Exam reveals no gallop and no friction rub. No murmur heard. Pulmonary/Chest: Effort normal and breath sounds normal. No stridor. No respiratory distress. She has no decreased breath sounds. She has no wheezes. She has no rhonchi. She has no rales. She exhibits no tenderness. Abdominal: Soft. Bowel sounds are normal. She exhibits no distension. There is no tenderness. There is no rebound and no guarding. Musculoskeletal: Normal range of motion. She exhibits no edema or tenderness. Neurological: She is alert and oriented to person, place, and time. She has normal strength and normal reflexes. She displays normal reflexes. No cranial nerve deficit or sensory deficit. She exhibits normal muscle tone. Coordination normal. GCS eye subscore is 4. GCS verbal subscore is 5. GCS motor subscore is 6. Skin: Skin is warm and dry. No rash noted. She is not diaphoretic. No erythema. No pallor. Psychiatric: She has a normal mood and affect. Her behavior is normal. Judgment and thought content normal.  
Nursing note and vitals reviewed. Note written by Lukas Madden. Alejandro Ely, as dictated by Cash Costello, DO 11:37 PM 
 
MDM Number of Diagnoses or Management Options Acute cystitis with hematuria:  
  
Amount and/or Complexity of Data Reviewed Clinical lab tests: ordered and reviewed Risk of Complications, Morbidity, and/or Mortality Presenting problems: moderate Diagnostic procedures: low Management options: moderate Patient Progress Patient progress: stable Procedures Chief Complaint Patient presents with [address]  Urinary Pain The patient's presenting problems have been discussed, and they are in agreement with the care plan formulated and outlined with them. I have encouraged them to ask questions as they arise throughout their visit. MEDICATIONS GIVEN: 
Medications  
sodium chloride (NS) flush 5-40 mL (10 mL IntraVENous Given 2/25/19 0010)  
sodium chloride (NS) flush 5-40 mL (not administered)  
sodium chloride 0.9 % bolus infusion 1,000 mL (0 mL IntraVENous IV Completed 2/25/19 0115)  
ketorolac (TORADOL) injection 30 mg (30 mg IntraVENous Given 2/25/19 0009) cefTRIAXone (ROCEPHIN) 1 g in sterile water (preservative free) 10 mL IV syringe (1 g IntraVENous Given 2/25/19 0056) LABS REVIEWED: 
Recent Results (from the past 24 hour(s)) URINALYSIS W/MICROSCOPIC Collection Time: 02/24/19 11:41 PM  
Result Value Ref Range Color YELLOW/STRAW Appearance CLEAR CLEAR Specific gravity 1.022 1.003 - 1.030    
 pH (UA) 5.5 5.0 - 8.0 Protein NEGATIVE  NEG mg/dL Glucose NEGATIVE  NEG mg/dL Ketone NEGATIVE  NEG mg/dL Bilirubin NEGATIVE  NEG Blood LARGE (A) NEG Urobilinogen 0.2 0.2 - 1.0 EU/dL Nitrites NEGATIVE  NEG Leukocyte Esterase LARGE (A) NEG    
 WBC  0 - 4 /hpf  
 RBC 5-10 0 - 5 /hpf Epithelial cells FEW FEW /lpf Bacteria NEGATIVE  NEG /hpf Hyaline cast 2-5 0 - 5 /lpf URINE CULTURE HOLD SAMPLE Collection Time: 02/24/19 11:41 PM  
Result Value Ref Range Urine culture hold URINE ON HOLD IN MICROBIOLOGY DEPT FOR 3 DAYS. IF UNPRESERVED URINE IS SUBMITTED, IT CANNOT BE USED FOR ADDITIONAL TESTING AFTER 24 HRS, RECOLLECTION WILL BE REQUIRED. SAMPLES BEING HELD  Collection Time: 02/24/19 11:41 PM  
 Result Value Ref Range SAMPLES BEING HELD 1SST COMMENT Add-on orders for these samples will be processed based on acceptable specimen integrity and analyte stability, which may vary by analyte. CBC WITH AUTOMATED DIFF Collection Time: 02/24/19 11:41 PM  
Result Value Ref Range WBC 10.4 3.6 - 11.0 K/uL  
 RBC 4.97 3.80 - 5.20 M/uL  
 HGB 14.3 11.5 - 16.0 g/dL HCT 43.7 35.0 - 47.0 % MCV 87.9 80.0 - 99.0 FL  
 MCH 28.8 26.0 - 34.0 PG  
 MCHC 32.7 30.0 - 36.5 g/dL  
 RDW 13.3 11.5 - 14.5 % PLATELET 480 156 - 409 K/uL MPV 10.2 8.9 - 12.9 FL  
 NRBC 0.0 0  WBC ABSOLUTE NRBC 0.00 0.00 - 0.01 K/uL NEUTROPHILS 52 32 - 75 % LYMPHOCYTES 37 12 - 49 % MONOCYTES 7 5 - 13 % EOSINOPHILS 3 0 - 7 % BASOPHILS 1 0 - 1 % IMMATURE GRANULOCYTES 0 0.0 - 0.5 % ABS. NEUTROPHILS 5.4 1.8 - 8.0 K/UL  
 ABS. LYMPHOCYTES 3.9 (H) 0.8 - 3.5 K/UL  
 ABS. MONOCYTES 0.7 0.0 - 1.0 K/UL  
 ABS. EOSINOPHILS 0.3 0.0 - 0.4 K/UL  
 ABS. BASOPHILS 0.1 0.0 - 0.1 K/UL  
 ABS. IMM. GRANS. 0.0 0.00 - 0.04 K/UL  
 DF AUTOMATED METABOLIC PANEL, COMPREHENSIVE Collection Time: 02/24/19 11:41 PM  
Result Value Ref Range Sodium 140 136 - 145 mmol/L Potassium 3.3 (L) 3.5 - 5.1 mmol/L Chloride 101 97 - 108 mmol/L  
 CO2 28 21 - 32 mmol/L Anion gap 11 5 - 15 mmol/L Glucose 96 65 - 100 mg/dL BUN 21 (H) 6 - 20 MG/DL Creatinine 1.06 (H) 0.55 - 1.02 MG/DL  
 BUN/Creatinine ratio 20 12 - 20 GFR est AA >60 >60 ml/min/1.73m2 GFR est non-AA 59 (L) >60 ml/min/1.73m2 Calcium 9.2 8.5 - 10.1 MG/DL Bilirubin, total 0.2 0.2 - 1.0 MG/DL  
 ALT (SGPT) 36 12 - 78 U/L  
 AST (SGOT) 24 15 - 37 U/L Alk. phosphatase 97 45 - 117 U/L Protein, total 7.4 6.4 - 8.2 g/dL Albumin 3.2 (L) 3.5 - 5.0 g/dL Globulin 4.2 (H) 2.0 - 4.0 g/dL A-G Ratio 0.8 (L) 1.1 - 2.2 VITAL SIGNS: 
Patient Vitals for the past 12 hrs: 
 Temp Pulse Resp BP SpO2 02/25/19 0146 98.2 °F (36.8 °C)      
02/25/19 0130  64 17 117/68 98 % 02/25/19 0100  71 12 121/78 99 % 02/25/19 0000    116/76   
02/24/19 2324 98.5 °F (36.9 °C) 73 19 (!) 146/92 99 % PROGRESS NOTES: 
Discussed results and plan with patient and significant other. Patient will be discharged home with OBGYN and PCP follow up. Patient instructed to return to the emergency room for any worsening symptoms or any other concerns. DIAGNOSIS: 
 
1. Acute cystitis with hematuria PLAN: 
Follow-up Information Follow up With Specialties Details Why Contact Info Alma Guardado MD Obstetrics & Gynecology, Gynecology, Obstetrics Schedule an appointment as soon as possible for a visit  [address] [facility] 
[phone] Wendy Maki MD Family Practice In 1 week As needed [address] [facility] 
[phone] [facility] EMERGENCY DEPT Emergency Medicine  If symptoms worsen [address] [place] [address] 
[phone] Discharge Medication List as of 2/25/2019  1:07 AM  
  
CONTINUE these medications which have NOT CHANGED Details LORazepam (ATIVAN) 0.5 mg tablet Take 1 Tab by mouth every eight (8) hours as needed for Anxiety. Max Daily Amount: 1.5 mg., Print, Disp-5 Tab, R-0  
  
ibuprofen (MOTRIN IB) 200 mg tablet Take 4 Tabs by mouth every eight (8) hours as needed for Pain., Print, Disp-30 Tab, R-0  
  
venlafaxine-SR (EFFEXOR XR) 37.5 mg capsule Take 1 Cap by mouth daily. , Print, Disp-30 Cap, R-0  
  
ferrous sulfate (SLOW FE PO) Take 1 Tab by mouth., Historical Med PNV QT.06/GYDCGUR fum/folic ac (PRENATAL PO) Take 1 Tab by mouth., Historical Med  
  
buPROPion SR (WELLBUTRIN SR) 150 mg SR tablet Take 150 mg by mouth daily. , Historical Med  
  
phentermine (ADIPEX-P) 37.5 mg tablet Take 37.5 mg by mouth every morning., Historical Med  
  
 vitamin E (AQUA GEMS) 400 unit capsule Take 400 Units by mouth daily. , Historical Med  
  
cetirizine (ZYRTEC) 10 mg tablet Take 1 Tab by mouth daily. , Print, Disp-20 Tab, R-0  
  
propranolol (INDERAL) 20 mg tablet Take 20 mg by mouth daily. , Historical Med  
  
albuterol sulfate 90 mcg/actuation aepb Take 2 Puffs by inhalation four (4) times daily as needed for Cough (SOB, wheezing). , Print, Disp-1 Inhaler, R-0  
  
SUMAtriptan (IMITREX) 25 mg tablet Take 25 mg by mouth once as needed for Migraine., Historical Med  
  
  
 
 
1:07 AM 
Patient's results have been reviewed with them. Patient and/or family have verbally conveyed their understanding and agreement of the patient's signs, symptoms, diagnosis, treatment and prognosis and additionally agree to follow up as recommended or return to the Emergency Room should their condition change prior to follow-up. Discharge instructions have also been provided to the patient with some educational information regarding their diagnosis as well a list of reasons why they would want to return to the ER prior to their follow-up appointment should their condition change. ED COURSE: The patient's hospital course has been uncomplicated.

## 2019-02-25 NOTE — DISCHARGE INSTRUCTIONS
We hope that we have addressed all of your medical concerns. The examination and treatment you received in the Emergency Department were for an emergent problem and were not intended as complete care. It is important that you follow up with your healthcare provider(s) for ongoing care. If your symptoms worsen or do not improve as expected, and you are unable to reach your usual health care provider(s), you should return to the Emergency Department. Today's healthcare is undergoing tremendous change, and patient satisfaction surveys are one of the many tools to assess the quality of medical care. You may receive a survey from the [employer] regarding your experience in the Emergency Department. I hope that your experience has been completely positive, particularly the medical care that I provided. As such, please participate in the survey; anything less than excellent does not meet my expectations or intentions. [facility] and [address] [place] [address] participate in nationally recognized quality of care measures. If your blood pressure is greater than 120/80, as reported below, we urge that you seek medical care to address the potential of high blood pressure, commonly known as hypertension. Hypertension can be hereditary or can be caused by certain medical conditions, pain, stress, or \"white coat syndrome. \"       Please make an appointment with your health care provider(s) for follow up of your Emergency Department visit. VITALS:   Patient Vitals for the past 8 hrs:   Temp Pulse Resp BP SpO2   02/25/19 0000 -- -- -- 116/76 --   02/24/19 2324 98.5 °F (36.9 °C) 73 19 (!) 146/92 99 %          Thank you for allowing us to provide you with medical care today. We realize that you have many choices for your emergency care needs. Please choose us in the future for any continued health care needs. Lisa Real [address].   Office: [phone]            Recent Results (from the past 24 hour(s))   URINALYSIS W/MICROSCOPIC    Collection Time: 02/24/19 11:41 PM   Result Value Ref Range    Color YELLOW/STRAW      Appearance CLEAR CLEAR      Specific gravity 1.022 1.003 - 1.030      pH (UA) 5.5 5.0 - 8.0      Protein NEGATIVE  NEG mg/dL    Glucose NEGATIVE  NEG mg/dL    Ketone NEGATIVE  NEG mg/dL    Bilirubin NEGATIVE  NEG      Blood LARGE (A) NEG      Urobilinogen 0.2 0.2 - 1.0 EU/dL    Nitrites NEGATIVE  NEG      Leukocyte Esterase LARGE (A) NEG      WBC  0 - 4 /hpf    RBC 5-10 0 - 5 /hpf    Epithelial cells FEW FEW /lpf    Bacteria NEGATIVE  NEG /hpf    Hyaline cast 2-5 0 - 5 /lpf   URINE CULTURE HOLD SAMPLE    Collection Time: 02/24/19 11:41 PM   Result Value Ref Range    Urine culture hold        URINE ON HOLD IN MICROBIOLOGY DEPT FOR 3 DAYS. IF UNPRESERVED URINE IS SUBMITTED, IT CANNOT BE USED FOR ADDITIONAL TESTING AFTER 24 HRS, RECOLLECTION WILL BE REQUIRED. SAMPLES BEING HELD    Collection Time: 02/24/19 11:41 PM   Result Value Ref Range    SAMPLES BEING HELD 1SST     COMMENT        Add-on orders for these samples will be processed based on acceptable specimen integrity and analyte stability, which may vary by analyte. CBC WITH AUTOMATED DIFF    Collection Time: 02/24/19 11:41 PM   Result Value Ref Range    WBC 10.4 3.6 - 11.0 K/uL    RBC 4.97 3.80 - 5.20 M/uL    HGB 14.3 11.5 - 16.0 g/dL    HCT 43.7 35.0 - 47.0 %    MCV 87.9 80.0 - 99.0 FL    MCH 28.8 26.0 - 34.0 PG    MCHC 32.7 30.0 - 36.5 g/dL    RDW 13.3 11.5 - 14.5 %    PLATELET 252 868 - 003 K/uL    MPV 10.2 8.9 - 12.9 FL    NRBC 0.0 0  WBC    ABSOLUTE NRBC 0.00 0.00 - 0.01 K/uL    NEUTROPHILS 52 32 - 75 %    LYMPHOCYTES 37 12 - 49 %    MONOCYTES 7 5 - 13 %    EOSINOPHILS 3 0 - 7 %    BASOPHILS 1 0 - 1 %    IMMATURE GRANULOCYTES 0 0.0 - 0.5 %    ABS. NEUTROPHILS 5.4 1.8 - 8.0 K/UL    ABS.  LYMPHOCYTES 3.9 (H) 0.8 - 3.5 K/UL    ABS. MONOCYTES 0.7 0.0 - 1.0 K/UL    ABS. EOSINOPHILS 0.3 0.0 - 0.4 K/UL    ABS. BASOPHILS 0.1 0.0 - 0.1 K/UL    ABS. IMM. GRANS. 0.0 0.00 - 0.04 K/UL    DF AUTOMATED     METABOLIC PANEL, COMPREHENSIVE    Collection Time: 02/24/19 11:41 PM   Result Value Ref Range    Sodium 140 136 - 145 mmol/L    Potassium 3.3 (L) 3.5 - 5.1 mmol/L    Chloride 101 97 - 108 mmol/L    CO2 28 21 - 32 mmol/L    Anion gap 11 5 - 15 mmol/L    Glucose 96 65 - 100 mg/dL    BUN 21 (H) 6 - 20 MG/DL    Creatinine 1.06 (H) 0.55 - 1.02 MG/DL    BUN/Creatinine ratio 20 12 - 20      GFR est AA >60 >60 ml/min/1.73m2    GFR est non-AA 59 (L) >60 ml/min/1.73m2    Calcium 9.2 8.5 - 10.1 MG/DL    Bilirubin, total 0.2 0.2 - 1.0 MG/DL    ALT (SGPT) 36 12 - 78 U/L    AST (SGOT) 24 15 - 37 U/L    Alk. phosphatase 97 45 - 117 U/L    Protein, total 7.4 6.4 - 8.2 g/dL    Albumin 3.2 (L) 3.5 - 5.0 g/dL    Globulin 4.2 (H) 2.0 - 4.0 g/dL    A-G Ratio 0.8 (L) 1.1 - 2.2         No results found. Patient Education        Urinary Tract Infection in Women: Care Instructions  Your Care Instructions    A urinary tract infection, or UTI, is a general term for an infection anywhere between the kidneys and the urethra (where urine comes out). Most UTIs are bladder infections. They often cause pain or burning when you urinate. UTIs are caused by bacteria and can be cured with antibiotics. Be sure to complete your treatment so that the infection goes away. Follow-up care is a key part of your treatment and safety. Be sure to make and go to all appointments, and call your doctor if you are having problems. It's also a good idea to know your test results and keep a list of the medicines you take. How can you care for yourself at home? · Take your antibiotics as directed. Do not stop taking them just because you feel better. You need to take the full course of antibiotics. · Drink extra water and other fluids for the next day or two.  This may help wash out the bacteria that are causing the infection. (If you have kidney, heart, or liver disease and have to limit fluids, talk with your doctor before you increase your fluid intake.)  · Avoid drinks that are carbonated or have caffeine. They can irritate the bladder. · Urinate often. Try to empty your bladder each time. · To relieve pain, take a hot bath or lay a heating pad set on low over your lower belly or genital area. Never go to sleep with a heating pad in place. To prevent UTIs  · Drink plenty of water each day. This helps you urinate often, which clears bacteria from your system. (If you have kidney, heart, or liver disease and have to limit fluids, talk with your doctor before you increase your fluid intake.)  · Urinate when you need to. · Urinate right after you have sex. · Change sanitary pads often. · Avoid douches, bubble baths, feminine hygiene sprays, and other feminine hygiene products that have deodorants. · After going to the bathroom, wipe from front to back. When should you call for help? Call your doctor now or seek immediate medical care if:    · Symptoms such as fever, chills, nausea, or vomiting get worse or appear for the first time.     · You have new pain in your back just below your rib cage. This is called flank pain.     · There is new blood or pus in your urine.     · You have any problems with your antibiotic medicine.    Watch closely for changes in your health, and be sure to contact your doctor if:    · You are not getting better after taking an antibiotic for 2 days.     · Your symptoms go away but then come back. Where can you learn more? Go to http://jose-jacek.info/. Enter K236 in the search box to learn more about \"Urinary Tract Infection in Women: Care Instructions. \"  Current as of: March 20, 2018  Content Version: 11.9  © 1069-3498 [employer].  Care instructions adapted under license by [employer] (which disclaims liability or warranty for this information). If you have questions about a medical condition or this instruction, always ask your healthcare professional. [place] [address] any warranty or liability for your use of this information.

## 2019-02-27 LAB
BACTERIA SPEC CULT: ABNORMAL
BACTERIA SPEC CULT: ABNORMAL
CC UR VC: ABNORMAL
SERVICE CMNT-IMP: ABNORMAL

## 2019-03-06 NOTE — DISCHARGE SUMMARY
Obstetrical Discharge Summary     Name: Matthias Lackey MRN: [id number]  SSN: [id number]    YOB: 1985  Age: 26 y.o. Sex: female      Allergies: Morphine; Compazine [prochlorperazine edisylate]; Percocet [oxycodone-acetaminophen]; Phenergan [promethazine]; Reglan [metoclopramide]; and Zofran [ondansetron hcl (pf)]    Admit Date: 2019    Discharge Date: 3/6/2019     Admitting Physician: Megan Altman MD     Attending Physician:  No att. providers found     * Admission Diagnoses: Encounter for elective induction of labor [Z34.90]    * Discharge Diagnoses:   Information for the patient's :  Mony Deluca [[id number]]   Delivery of a 4.35 kg male infant via 6:13 PM on 2019 at   by Erick Reed. Apgars were 9  and 9 .        Additional Diagnoses:   Problem List as of 2/10/2019 Date Reviewed: 2018          Codes Class Noted - Resolved    Encounter for elective induction of labor ICD-10-CM: Z34.90  ICD-9-CM: V22.1  2019 - Present        Nausea & vomiting ICD-10-CM: R11.2  ICD-9-CM: 787.01  2018 - Present        Migraine ICD-10-CM: M85.654  ICD-9-CM: 346.90  2018 - Present        Gastroenteritis ICD-10-CM: K52.9  ICD-9-CM: 558.9  2018 - Present        RESOLVED: Nasal septal deviation ICD-10-CM: J34.2  ICD-9-CM: 472  2016 - 2016        Chronic sinus infection ICD-10-CM: J32.9  ICD-9-CM: 473.9 Present on Admission 2016 - Present             Lab Results   Component Value Date/Time    Rubella, External immune  2018    GrBStrep, External positive  2019    ABO,Rh A positive  2018       Immunization(s):   Immunization History   Administered Date(s) Administered    Influenza Vaccine (Quad) PF 02/10/2019        * Procedures:    * No surgery found *      [place]  Depression Scale  I have been able to laugh and see the funny side of things: As much as I always could  I have looked forward with enjoyment to things: As much as I ever did  I have blamed myself unnecessarily when things went wrong: Not very often  I have been anxious or worried for no good reason: Yes, sometimes  I have felt scared or panicky for no very good reason: No, not much  Things have been getting on top of me: No, most of the time I have coped quite well  I have been so unhappy that I have had difficulty sleeping: Not very often  I have felt sad or miserable: Not very often  I have been so unhappy that I have been crying: Only occasionally  The thought of harming myself has occurred to me: Never  Total Score: 8    * Discharge Condition: good    [facility] Course: Normal hospital course following the delivery. * Disposition: Home    Discharge Medications:   Discharge Medication List as of 2/10/2019  1:50 PM      START taking these medications    Details   ibuprofen (MOTRIN IB) 200 mg tablet Take 4 Tabs by mouth every eight (8) hours as needed for Pain., Print, Disp-30 Tab, R-0      venlafaxine-SR (EFFEXOR XR) 37.5 mg capsule Take 1 Cap by mouth daily. , Print, Disp-30 Cap, R-0         CONTINUE these medications which have CHANGED    Details   LORazepam (ATIVAN) 0.5 mg tablet Take 1 Tab by mouth every eight (8) hours as needed for Anxiety. Max Daily Amount: 1.5 mg., Print, Disp-5 Tab, R-0         CONTINUE these medications which have NOT CHANGED    Details   ferrous sulfate (SLOW FE PO) Take 1 Tab by mouth., Historical Med      PNV BM.00/ETCVDIZ fum/folic ac (PRENATAL PO) Take 1 Tab by mouth., Historical Med      buPROPion SR (WELLBUTRIN SR) 150 mg SR tablet Take 150 mg by mouth daily. , Historical Med      phentermine (ADIPEX-P) 37.5 mg tablet Take 37.5 mg by mouth every morning., Historical Med      vitamin E (AQUA GEMS) 400 unit capsule Take 400 Units by mouth daily. , Historical Med      cetirizine (ZYRTEC) 10 mg tablet Take 1 Tab by mouth daily. , Print, Disp-20 Tab, R-0      propranolol (INDERAL) 20 mg tablet Take 20 mg by mouth daily. , Historical Med      albuterol sulfate 90 mcg/actuation aepb Take 2 Puffs by inhalation four (4) times daily as needed for Cough (SOB, wheezing). , Print, Disp-1 Inhaler, R-0      SUMAtriptan (IMITREX) 25 mg tablet Take 25 mg by mouth once as needed for Migraine., Historical Med             * Follow-up Care/Patient Instructions: Activity: Activity as tolerated  Diet: Regular Diet  Wound Care: Keep wound clean and dry    Follow-up Information     Follow up With Specialties Details Why Contact Info    Wallace Solomon MD Family Practice   [address]  [address]  [facility]  [phone]             Signed By:  Geeta Olivares MD     March 6, 2019                    .

## 2019-04-02 ENCOUNTER — HOSPITAL ENCOUNTER (OUTPATIENT)
Dept: ULTRASOUND IMAGING | Age: 34
Discharge: HOME | End: 2019-04-02
Attending: SPECIALIST
Payer: MEDICAID

## 2019-04-02 DIAGNOSIS — M54.9 DORSALGIA: ICD-10-CM

## 2019-04-02 PROCEDURE — 76770 US EXAM ABDO BACK WALL COMP: CPT

## 2019-11-10 ENCOUNTER — HOSPITAL ENCOUNTER (EMERGENCY)
Age: 34
Discharge: HOME | End: 2019-11-10
Attending: EMERGENCY MEDICINE
Payer: MEDICAID

## 2019-11-10 VITALS
HEART RATE: 84 BPM | HEIGHT: 65 IN | TEMPERATURE: 98.4 F | OXYGEN SATURATION: 96 % | DIASTOLIC BLOOD PRESSURE: 70 MMHG | SYSTOLIC BLOOD PRESSURE: 123 MMHG | WEIGHT: 260 LBS | RESPIRATION RATE: 14 BRPM | BODY MASS INDEX: 43.32 KG/M2

## 2019-11-10 DIAGNOSIS — G43.009 MIGRAINE WITHOUT AURA AND WITHOUT STATUS MIGRAINOSUS, NOT INTRACTABLE: Primary | ICD-10-CM

## 2019-11-10 LAB
COMMENT, HOLDF: NORMAL
SAMPLES BEING HELD,HOLD: NORMAL

## 2019-11-10 PROCEDURE — 96375 TX/PRO/DX INJ NEW DRUG ADDON: CPT

## 2019-11-10 PROCEDURE — 96365 THER/PROPH/DIAG IV INF INIT: CPT

## 2019-11-10 PROCEDURE — 96366 THER/PROPH/DIAG IV INF ADDON: CPT

## 2019-11-10 PROCEDURE — 74011000258 HC RX REV CODE- 258: Performed by: EMERGENCY MEDICINE

## 2019-11-10 PROCEDURE — 74011250636 HC RX REV CODE- 250/636: Performed by: NURSE PRACTITIONER

## 2019-11-10 PROCEDURE — 99284 EMERGENCY DEPT VISIT MOD MDM: CPT

## 2019-11-10 PROCEDURE — 74011250636 HC RX REV CODE- 250/636: Performed by: EMERGENCY MEDICINE

## 2019-11-10 PROCEDURE — 96367 TX/PROPH/DG ADDL SEQ IV INF: CPT

## 2019-11-10 RX ORDER — DIPHENHYDRAMINE HYDROCHLORIDE 50 MG/ML
25 INJECTION, SOLUTION INTRAMUSCULAR; INTRAVENOUS
Status: COMPLETED | OUTPATIENT
Start: 2019-11-10 | End: 2019-11-10

## 2019-11-10 RX ORDER — MAGNESIUM SULFATE 1 G/100ML
1 INJECTION INTRAVENOUS ONCE
Status: COMPLETED | OUTPATIENT
Start: 2019-11-10 | End: 2019-11-10

## 2019-11-10 RX ORDER — PROMETHAZINE HYDROCHLORIDE 25 MG/1
25 TABLET ORAL
Status: DISCONTINUED | OUTPATIENT
Start: 2019-11-10 | End: 2019-11-10

## 2019-11-10 RX ORDER — KETOROLAC TROMETHAMINE 30 MG/ML
30 INJECTION, SOLUTION INTRAMUSCULAR; INTRAVENOUS
Status: COMPLETED | OUTPATIENT
Start: 2019-11-10 | End: 2019-11-10

## 2019-11-10 RX ADMIN — PROMETHAZINE HYDROCHLORIDE 25 MG: 25 INJECTION INTRAMUSCULAR; INTRAVENOUS at 15:26

## 2019-11-10 RX ADMIN — SODIUM CHLORIDE 1000 ML: 900 INJECTION, SOLUTION INTRAVENOUS at 15:12

## 2019-11-10 RX ADMIN — DIPHENHYDRAMINE HYDROCHLORIDE 25 MG: 50 INJECTION, SOLUTION INTRAMUSCULAR; INTRAVENOUS at 15:13

## 2019-11-10 RX ADMIN — MAGNESIUM SULFATE HEPTAHYDRATE 1 G: 1 INJECTION, SOLUTION INTRAVENOUS at 17:13

## 2019-11-10 RX ADMIN — KETOROLAC TROMETHAMINE 30 MG: 30 INJECTION, SOLUTION INTRAMUSCULAR at 15:12

## 2019-11-10 RX ADMIN — METHYLPREDNISOLONE SODIUM SUCCINATE 125 MG: 125 INJECTION, POWDER, FOR SOLUTION INTRAMUSCULAR; INTRAVENOUS at 17:16

## 2019-11-10 NOTE — DISCHARGE INSTRUCTIONS
Thank you for allowing us to care for you today. Please follow-up with your Primary Care provider in the next 2-3 days if your symptoms do not improve. Plan for home: You were seen in the ER for a migraine headache. Continue medications prescribed to you by your primary care or neurologist. Come back to the ER if you have any worsening symptoms, headache, nausea, vomiting that prevents you from staying hydrated. Follow-up with your gynecologist if you believe your migraines are coming from your oral contraception or your IUD. Since you have no primary care provider listed we will refer you to [id number] [facility] are open 7 days a week. They are a part of the [employer]. They will have access to x-rays and labs you had done here in the Emergency Room. Patient Education        Deciding About Taking Medicine to Prevent Migraines  How can you decide about taking medicine to prevent migraine headaches? What are migraines? Migraines are painful, throbbing headaches. They can last from 4 to 72 hours. They often occur on only one side of your head. But you may feel them on both sides. The pain may keep you from doing your daily activities. You may take a daily medicine if you get bad migraines often. This can help prevent them. What are key points about this decision? · Medicines to prevent migraines may not stop them every time. But if you take them daily, you can reduce how many migraines you get by more than half. They can also reduce how long migraines last. And your symptoms may not be as bad. · Medicines that prevent migraines may cause side effects. You may have sleep and memory problems, upset stomach, dry mouth, or constipation. Some of these side effects may last for as long as you take the medicine. Or they may go away within a few weeks. Why might you choose to take medicine to prevent migraines?   · You are willing to take medicine daily if it will help your symptoms. · You don't think the side effects of the medicine could be as bad as your migraine symptoms. · Your migraines get in the way of your work. Or they harm your relationships with friends and family. · Benefits of medicine include fewer or no migraines. And your migraines may not last as long or feel as bad. Why might you choose not to take medicine to prevent migraines? · You want to avoid the side effects of the medicine. · You don't want to take medicine every day. · Your migraines are not affecting your work and relationships. · If your symptoms don't improve with home treatment and other medicines, you can decide later to take medicine every day to help prevent migraines. Your decision  Thinking about the facts and your feelings can help you make a decision that is right for you. Be sure you understand the benefits and risks of your options, and think about what else you need to do before you make the decision. Where can you learn more? Go to http://jose-jacek.info/. Enter Y053 in the search box to learn more about \"Deciding About Taking Medicine to Prevent Migraines. \"  Current as of: March 28, 2019  Content Version: 12.2  © 5109-7986 [employer]. Care instructions adapted under license by [facility] [facility] [employer] (which disclaims liability or warranty for this information). If you have questions about a medical condition or this instruction, always ask your healthcare professional. [place] [address] any warranty or liability for your use of this information.

## 2019-11-10 NOTE — ED NOTES
Patient given discharge instructions. Patient able to verbalize understanding. Patient ambulatory from ED to home with mother and two children.

## 2019-11-10 NOTE — ED PROVIDER NOTES
Patient is a 57-year-old female with a past medical history significant for anemia, hypertension, GERD, stational hypertension, lupus, migraine, anxiety/depression who is amatory to the ED today with her mother with complaints of migraine. Migraine started on Thursday. Mother is with the patient and states she has tried Tylenol, Advil, oxycodone 10 mg, oral Phenergan without relief of her symptoms. Patient has sumatriptan at home but must take it at the initial start of her symptoms are will not work. Mother notes on Monday, Tuesday and Wednesday of last week she was on antibiotics for sinus infection. She has some lymphadenopathy at the same time on the right side of her neck that is now gone. Mother also states that she started oral contraception 5 days ago. She has an IUD but has had persistent bleeding so the GYN started her on perception. She stopped the pills on Friday due to the headaches. She notes a past association of migraines with IUD and oral contraception use. The migraine is typical of her symptoms. Has photophobia, nausea,  vomiting.          Past Medical History:   Diagnosis Date    Abnormal Papanicolaou smear of cervix     HPV    Anemia     Bilateral ovarian cysts     Essential hypertension     preeclampsia with first pregnancy    GERD (gastroesophageal reflux disease)     Gestational hypertension     with first pregnancy     Lupus ([address]     diagnosed at 11years of age   [facility] Migraine     Neurological disorder     migraines     Other ill-defined conditions(799.89)     lupus    Postpartum depression     prescribed effexor     Psychiatric disorder     anxiety/depression- was taking ativan prior to pregnancy     Past Surgical History:   Procedure Laterality Date    COLONOSCOPY N/A 3/14/2017    COLONOSCOPY performed by Nadia Izquierdo MD at [facility]  3/14/2017         HX GI      LAPAROSCOPY    HX HEENT  4/4/16    sinus surgery    HX HERNIA REPAIR BILATERAL INGUINAL HERNIA REPAIR    HX OTHER SURGICAL      hemmorhoid, hernia-groin as an infant     HX OTHER SURGICAL      sinus surgery        Family History:   Problem Relation Age of Onset    Anesth Problems Neg Hx      Social History     Socioeconomic History    Marital status: SINGLE     Spouse name: Not on file    Number of children: Not on file    Years of education: Not on file    Highest education level: Not on file   Occupational History    Not on file   Social Needs    Financial resource strain: Not on file    Food insecurity:     Worry: Not on file     Inability: Not on file    Transportation needs:     Medical: Not on file     Non-medical: Not on file   Tobacco Use    Smoking status: Former Smoker     Years: 1.00     Last attempt to quit: 3/31/2014     Years since quittin.6    Smokeless tobacco: Never Used   Substance and Sexual Activity    Alcohol use: No    Drug use: No    Sexual activity: Not on file   Lifestyle    Physical activity:     Days per week: Not on file     Minutes per session: Not on file    Stress: Not on file   Relationships    Social connections:     Talks on phone: Not on file     Gets together: Not on file     Attends Mandaeism service: Not on file     Active member of club or organization: Not on file     Attends meetings of clubs or organizations: Not on file     Relationship status: Not on file    Intimate partner violence:     Fear of current or ex partner: Not on file     Emotionally abused: Not on file     Physically abused: Not on file     Forced sexual activity: Not on file   Other Topics Concern     Service Not Asked    Blood Transfusions Not Asked    Caffeine Concern Not Asked    Occupational Exposure Not Asked   Rosalene José Miguel Hazards Not Asked    Sleep Concern Not Asked    Stress Concern Not Asked    Weight Concern Not Asked    Special Diet Not Asked    Back Care Not Asked    Exercise Not Asked    Bike Helmet Not Asked    [facility] Not Asked    Self-Exams Not Asked   Social History Narrative    Not on file     ALLERGIES: Morphine; Compazine [prochlorperazine edisylate]; Percocet [oxycodone-acetaminophen]; Reglan [metoclopramide]; and Zofran [ondansetron hcl (pf)]    Review of Systems   Constitutional: Negative for chills and fever. Eyes: Positive for photophobia. Respiratory: Negative for shortness of breath. Cardiovascular: Negative for chest pain. Gastrointestinal: Positive for nausea and vomiting. Neurological: Positive for headaches. Sonophobia   Psychiatric/Behavioral: Negative. All other systems reviewed and are negative. Vitals:    11/10/19 1432   BP: 136/88   Pulse: 84   Resp: 14   Temp: 98.4 °F (36.9 °C)   SpO2: 97%   Weight: 117.9 kg (260 lb)   Height: 5' 5\" (1.651 m)          Physical Exam   Constitutional: She is oriented to person, place, and time. She appears well-developed and well-nourished. HENT:   Head: Normocephalic and atraumatic. Eyes: Pupils are equal, round, and reactive to light. Conjunctivae, EOM and lids are normal.   Neck: Normal range of motion. Neck supple. Cardiovascular: Normal rate, regular rhythm, normal heart sounds and intact distal pulses. Pulmonary/Chest: Effort normal and breath sounds normal. No respiratory distress. She has no wheezes. She has no rales. She exhibits no tenderness. Abdominal: Soft. Bowel sounds are normal. There is no tenderness. There is no guarding. Musculoskeletal: Normal range of motion. Neurological: She is alert and oriented to person, place, and time. GCS eye subscore is 4. GCS verbal subscore is 5. GCS motor subscore is 6. Skin: Skin is warm and dry. No erythema. Psychiatric: She has a normal mood and affect. Her speech is normal and behavior is normal. Judgment and thought content normal. Cognition and memory are normal.   Nursing note and vitals reviewed.        [facility]     Procedures      Assessment & Plan:     Orders Placed This Encounter  Hold Sample    DISCONTD: promethazine (PHENERGAN) tablet 25 mg    diphenhydrAMINE (BENADRYL) injection 25 mg    ketorolac (TORADOL) injection 30 mg    sodium chloride 0.9 % bolus infusion 1,000 mL    DISCONTD: promethazine (PHENERGAN) 25 mg in 0.9% sodium chloride 50 mL IVPB    promethazine (PHENERGAN) 25 mg in 0.9% sodium chloride 50 mL IVPB       Discussed with Lynnette Figueroa MD,ED Provider    Roma Vargas NP  11/10/19  2:54 PM      Still having pain. Solumedrol 125 mg IV and magnesium IV 1gm now. Roma Vargas NP  11/10/19  4:49 PM      5:47 PM  Change of shift. Care of patient signed over to Phyllis Brady MD.  Awaiting completion of magnesium. Please note that this dictation was completed with [employer], the computer voice recognition software. Quite often unanticipated grammatical, syntax, homophones, and other interpretive errors are inadvertently transcribed by the computer software. Please disregard these errors. Please excuse any errors that have escaped final proofreading.

## 2019-11-10 NOTE — ED TRIAGE NOTES
Pt here for migraine starting Thursday after starting new birth control 3 days prior. Pt has not taken [facility] for 3 days. Pt states her normal migraine medications are not working. Currently taking amoxiclave for sinus infection. Pt is nauseated with sensitive to light and sound. 800mg advil at 0930.

## 2020-01-29 ENCOUNTER — HOSPITAL ENCOUNTER (OUTPATIENT)
Dept: MAMMOGRAPHY | Age: 35
Discharge: HOME | End: 2020-01-29
Attending: SPECIALIST
Payer: MEDICAID

## 2020-01-29 ENCOUNTER — HOSPITAL ENCOUNTER (OUTPATIENT)
Dept: ULTRASOUND IMAGING | Age: 35
Discharge: HOME | End: 2020-01-29
Attending: SPECIALIST
Payer: MEDICAID

## 2020-01-29 DIAGNOSIS — N63.21 MASS OF UPPER OUTER QUADRANT OF LEFT BREAST: ICD-10-CM

## 2020-01-29 PROCEDURE — 76642 ULTRASOUND BREAST LIMITED: CPT

## 2020-01-29 PROCEDURE — 77066 DX MAMMO INCL CAD BI: CPT

## 2020-02-29 ENCOUNTER — HOSPITAL ENCOUNTER (EMERGENCY)
Age: 35
Discharge: HOME | End: 2020-03-01
Attending: EMERGENCY MEDICINE
Payer: MEDICAID

## 2020-02-29 DIAGNOSIS — Z97.5 IUD (INTRAUTERINE DEVICE) IN PLACE: ICD-10-CM

## 2020-02-29 DIAGNOSIS — N76.0 VAGINITIS AND VULVOVAGINITIS: Primary | ICD-10-CM

## 2020-02-29 PROCEDURE — 99284 EMERGENCY DEPT VISIT MOD MDM: CPT

## 2020-03-01 ENCOUNTER — APPOINTMENT (OUTPATIENT)
Dept: ULTRASOUND IMAGING | Age: 35
End: 2020-03-01
Attending: EMERGENCY MEDICINE
Payer: MEDICAID

## 2020-03-01 VITALS
SYSTOLIC BLOOD PRESSURE: 137 MMHG | BODY MASS INDEX: 41.18 KG/M2 | HEART RATE: 76 BPM | OXYGEN SATURATION: 100 % | DIASTOLIC BLOOD PRESSURE: 106 MMHG | RESPIRATION RATE: 18 BRPM | WEIGHT: 247.14 LBS | HEIGHT: 65 IN | TEMPERATURE: 98.2 F

## 2020-03-01 LAB
APPEARANCE UR: CLEAR
BACTERIA URNS QL MICRO: NEGATIVE /HPF
BASOPHILS # BLD: 0.1 K/UL (ref 0–0.1)
BASOPHILS NFR BLD: 1 % (ref 0–1)
BILIRUB UR QL: NEGATIVE
CLUE CELLS VAG QL WET PREP: NORMAL
COLOR UR: ABNORMAL
DIFFERENTIAL METHOD BLD: ABNORMAL
EOSINOPHIL # BLD: 0.2 K/UL (ref 0–0.4)
EOSINOPHIL NFR BLD: 1 % (ref 0–7)
EPITH CASTS URNS QL MICRO: ABNORMAL /LPF
ERYTHROCYTE [DISTWIDTH] IN BLOOD BY AUTOMATED COUNT: 13.5 % (ref 11.5–14.5)
GLUCOSE UR STRIP.AUTO-MCNC: NEGATIVE MG/DL
HCT VFR BLD AUTO: 41.4 % (ref 35–47)
HGB BLD-MCNC: 13.4 G/DL (ref 11.5–16)
HGB UR QL STRIP: ABNORMAL
IMM GRANULOCYTES # BLD AUTO: 0 K/UL (ref 0–0.04)
IMM GRANULOCYTES NFR BLD AUTO: 0 % (ref 0–0.5)
KETONES UR QL STRIP.AUTO: NEGATIVE MG/DL
KOH PREP SPEC: NORMAL
LEUKOCYTE ESTERASE UR QL STRIP.AUTO: NEGATIVE
LYMPHOCYTES # BLD: 3.3 K/UL (ref 0.8–3.5)
LYMPHOCYTES NFR BLD: 30 % (ref 12–49)
MCH RBC QN AUTO: 27.1 PG (ref 26–34)
MCHC RBC AUTO-ENTMCNC: 32.4 G/DL (ref 30–36.5)
MCV RBC AUTO: 83.6 FL (ref 80–99)
MONOCYTES # BLD: 0.8 K/UL (ref 0–1)
MONOCYTES NFR BLD: 7 % (ref 5–13)
MUCOUS THREADS URNS QL MICRO: ABNORMAL /LPF
NEUTS SEG # BLD: 6.8 K/UL (ref 1.8–8)
NEUTS SEG NFR BLD: 61 % (ref 32–75)
NITRITE UR QL STRIP.AUTO: NEGATIVE
NRBC # BLD: 0 K/UL (ref 0–0.01)
NRBC BLD-RTO: 0 PER 100 WBC
PH UR STRIP: 5.5 [PH] (ref 5–8)
PLATELET # BLD AUTO: 276 K/UL (ref 150–400)
PMV BLD AUTO: 10.4 FL (ref 8.9–12.9)
PROT UR STRIP-MCNC: ABNORMAL MG/DL
RBC # BLD AUTO: 4.95 M/UL (ref 3.8–5.2)
RBC #/AREA URNS HPF: ABNORMAL /HPF
SERVICE CMNT-IMP: NORMAL
SP GR UR REFRACTOMETRY: >1.03 (ref 1–1.03)
T VAGINALIS VAG QL WET PREP: NORMAL
UA: UC IF INDICATED,UAUC: ABNORMAL
UROBILINOGEN UR QL STRIP.AUTO: 1 EU/DL (ref 0.2–1)
WBC # BLD AUTO: 11.1 K/UL (ref 3.6–11)
WBC URNS QL MICRO: ABNORMAL /HPF

## 2020-03-01 PROCEDURE — 87491 CHLMYD TRACH DNA AMP PROBE: CPT

## 2020-03-01 PROCEDURE — 81001 URINALYSIS AUTO W/SCOPE: CPT

## 2020-03-01 PROCEDURE — 87210 SMEAR WET MOUNT SALINE/INK: CPT

## 2020-03-01 PROCEDURE — 85025 COMPLETE CBC W/AUTO DIFF WBC: CPT

## 2020-03-01 PROCEDURE — 36415 COLL VENOUS BLD VENIPUNCTURE: CPT

## 2020-03-01 PROCEDURE — 76830 TRANSVAGINAL US NON-OB: CPT

## 2020-03-01 RX ORDER — METHYLPREDNISOLONE 4 MG/1
TABLET ORAL
Qty: 1 DOSE PACK | Refills: 0 | Status: SHIPPED | OUTPATIENT
Start: 2020-03-01 | End: 2020-09-21 | Stop reason: ALTCHOICE

## 2020-03-01 NOTE — ED PROVIDER NOTES
EMERGENCY DEPARTMENT HISTORY AND PHYSICAL EXAM      Date: 2/29/2020  Patient Name: Chang Vences  Patient Age and Sex: 37 y.o. female     History of Presenting Illness     Chief Complaint   Patient presents with    Vaginal Bleeding     onset of sxs, onging sxs - hx of IUD, placed on April 2019 - 3 months ago started having internal vaginal pain, sharp - last OBGYN 1.5 months ago - Denies dizziness / cp / fevers / chills        History Provided By: Patient    HPI: Chang Vences is a 33-year-old female presenting with vaginal pain and vaginal bleeding. Patient states for about a year now after her delivery of her child, has been having significant amount of bleeding. States that 2 months after delivery in April 2019 had an IUD placed and every time she has a period, she ends up bleeding for about 2 to 3 weeks. States that this time she is been bleeding for about 2-1/2 weeks. Has followed up with her gynecologist but unfortunately cannot do birth control because it makes her migraines worse. She gets ultrasounds frequently due to ovarian cysts and had 1 2 months ago. Patient states that recently however started to have vaginal pain and feels a fleshy substance at the entrance of the vaginal canal.  Denies any vaginal discharge though has been dealing with yeast infections every time she is on antibiotics for colds. Denies any abdominal pain, pelvic pain, nausea, vomiting or fevers. Patient states that she has been on antibiotics a lot recently due to colds, sinus issues and thus gets yeast infections. Took Diflucan recently. There are no other complaints, changes, or physical findings at this time. PCP: None    No current facility-administered medications on file prior to encounter. Current Outpatient Medications on File Prior to Encounter   Medication Sig Dispense Refill    norethindrone-e.estradiol-iron (LO LOESTRIN FE) 1 mg-10 mcg (24)/10 mcg (2) tab Take  by mouth.       LORazepam (ATIVAN) 0.5 mg tablet Take 1 Tab by mouth every eight (8) hours as needed for Anxiety. Max Daily Amount: 1.5 mg. 5 Tab 0    ibuprofen (MOTRIN IB) 200 mg tablet Take 4 Tabs by mouth every eight (8) hours as needed for Pain. 30 Tab 0    venlafaxine-SR (EFFEXOR XR) 37.5 mg capsule Take 1 Cap by mouth daily. 30 Cap 0    ferrous sulfate (SLOW FE PO) Take 1 Tab by mouth.  PNV UT.34/RMKULDW fum/folic ac (PRENATAL PO) Take 1 Tab by mouth.  buPROPion SR (WELLBUTRIN SR) 150 mg SR tablet Take 150 mg by mouth daily.  phentermine (ADIPEX-P) 37.5 mg tablet Take 37.5 mg by mouth every morning.  vitamin E (AQUA GEMS) 400 unit capsule Take 400 Units by mouth daily.  cetirizine (ZYRTEC) 10 mg tablet Take 1 Tab by mouth daily. 20 Tab 0    propranolol (INDERAL) 20 mg tablet Take 20 mg by mouth daily.  albuterol sulfate 90 mcg/actuation aepb Take 2 Puffs by inhalation four (4) times daily as needed for Cough (SOB, wheezing). 1 Inhaler 0    SUMAtriptan (IMITREX) 25 mg tablet Take 25 mg by mouth once as needed for Migraine.          Past History     Past Medical History:  Past Medical History:   Diagnosis Date    Abnormal Papanicolaou smear of cervix     HPV    Anemia     Bilateral ovarian cysts     Essential hypertension     preeclampsia with first pregnancy    GERD (gastroesophageal reflux disease)     Gestational hypertension     with first pregnancy     Lupus ([address]     diagnosed at 11years of age   24 [facility] Migraine     Neurological disorder     migraines     Other ill-defined conditions(799.89)     lupus    Postpartum depression     prescribed effexor     Psychiatric disorder     anxiety/depression- was taking ativan prior to pregnancy       Past Surgical History:  Past Surgical History:   Procedure Laterality Date    COLONOSCOPY N/A 3/14/2017    COL[facility] performed [facility] Jenifer [facility]  3/14/2017         HX GI      LAPAROSCOPY    HX HEENT  4/4/16    sinus surgery    HX HERNIA REPAIR      BILATERAL INGUINAL HERNIA REPAIR    HX OTHER SURGICAL      hemmorhoid, hernia-groin as an infant     HX OTHER SURGICAL      sinus surgery        Family History:  Family History   Problem Relation Age of Onset    Breast Cancer Paternal Grandmother     Anesth Problems Neg Hx        Social History:  Social History     Tobacco Use    Smoking status: Former Smoker     Years: 1.00     Last attempt to quit: 3/31/2014     Years since quittin.9    Smokeless tobacco: Never Used   Substance Use Topics    Alcohol use: No    Drug use: No       Allergies: Allergies   Allergen Reactions    Morphine Other (comments)     Pt states it makes her \"very angry\"    Compazine [Prochlorperazine Edisylate] Other (comments)     Patient felt very anxious and apprehensive after IV dose.  Percocet [Oxycodone-Acetaminophen] Nausea and Vomiting    Reglan [Metoclopramide] Other (comments)     anxious    Zofran [Ondansetron Hcl (Pf)] Other (comments)     Per patient \"makes me edgy\"         Review of Systems   Review of Systems   Constitutional: Negative for chills and fever. Respiratory: Negative for cough and shortness of breath. Cardiovascular: Negative for chest pain. Gastrointestinal: Negative for abdominal pain, constipation, diarrhea, nausea and vomiting. Genitourinary: Positive for vaginal bleeding and vaginal pain. Negative for dysuria, frequency, hematuria, pelvic pain and urgency. Neurological: Negative for weakness and numbness. All other systems reviewed and are negative. Physical Exam   Physical Exam  Constitutional:       General: She is not in acute distress. Appearance: She is well-developed. HENT:      Head: Normocephalic and atraumatic. Neck:      Musculoskeletal: Normal range of motion. Cardiovascular:      Comments: Well perfused  Pulmonary:      Effort: Pulmonary effort is normal. No respiratory distress.    Genitourinary:     General: Normal vulva. Vagina: No vaginal discharge. Comments: Minimal vaginal bleeding. IUD string. Significant swelling and mucosal swelling of vaginal walls. TTP  Musculoskeletal: Normal range of motion. Neurological:      Mental Status: She is alert. Diagnostic Study Results     Labs -     Recent Results (from the past 12 hour(s))   URINALYSIS W/ REFLEX CULTURE    Collection Time: 03/01/20 12:04 AM   Result Value Ref Range    Color YELLOW/STRAW      Appearance CLEAR CLEAR      Specific gravity >1.030 (H) 1.003 - 1.030    pH (UA) 5.5 5.0 - 8.0      Protein TRACE (A) NEG mg/dL    Glucose NEGATIVE  NEG mg/dL    Ketone NEGATIVE  NEG mg/dL    Bilirubin NEGATIVE  NEG      Blood LARGE (A) NEG      Urobilinogen 1.0 0.2 - 1.0 EU/dL    Nitrites NEGATIVE  NEG      Leukocyte Esterase NEGATIVE  NEG      WBC 0-4 /hpf    RBC 20-50 /hpf    Epithelial cells MANY /lpf    Bacteria NEGATIVE  /hpf    UA:UC IF INDICATED CULTURE NOT INDICATED BY UA RESULT      Mucus 4+ /lpf   CBC WITH AUTOMATED DIFF    Collection Time: 03/01/20 12:04 AM   Result Value Ref Range    WBC 11.1 (H) 3.6 - 11.0 K/uL    RBC 4.95 3.80 - 5.20 M/uL    HGB 13.4 11.5 - 16.0 g/dL    HCT 41.4 35.0 - 47.0 %    MCV 83.6 80.0 - 99.0 FL    MCH 27.1 26.0 - 34.0 PG    MCHC 32.4 30.0 - 36.5 g/dL    RDW 13.5 11.5 - 14.5 %    PLATELET 745 977 - 336 K/uL    MPV 10.4 8.9 - 12.9 FL    NRBC 0.0 0  WBC    ABSOLUTE NRBC 0.00 0.00 - 0.01 K/uL    NEUTROPHILS 61 32 - 75 %    LYMPHOCYTES 30 12 - 49 %    MONOCYTES 7 5 - 13 %    EOSINOPHILS 1 0 - 7 %    BASOPHILS 1 0 - 1 %    IMMATURE GRANULOCYTES 0 0.0 - 0.5 %    ABS. NEUTROPHILS 6.8 1.8 - 8.0 K/UL    ABS. LYMPHOCYTES 3.3 0.8 - 3.5 K/UL    ABS. MONOCYTES 0.8 0.0 - 1.0 K/UL    ABS. EOSINOPHILS 0.2 0.0 - 0.4 K/UL    ABS. BASOPHILS 0.1 0.0 - 0.1 K/UL    ABS. IMM.  GRANS. 0.0 0.00 - 0.04 K/UL    DF AUTOMATED     FABRICIO, OTHER SOURCES    Collection Time: 03/01/20 12:49 AM   Result Value Ref Range    Special Requests: NO SPECIAL REQUESTS      KOH NO YEAST SEEN     WET PREP    Collection Time: 03/01/20 12:49 AM   Result Value Ref Range    Clue cells CLUE CELLS ABSENT      Wet prep NO TRICHOMONAS SEEN         Radiologic Studies -   US TRANSVAGINAL   Final Result   IMPRESSION:     1. IUD in a low position within the lower uterus/cervix   2. Nonvisualization of the left ovary. CT Results  (Last 48 hours)    None        CXR Results  (Last 48 hours)    None            Medical Decision Making   I am the first provider for this patient. I reviewed the vital signs, available nursing notes, past medical history, past surgical history, family history and social history. Vital Signs-Reviewed the patient's vital signs. Patient Vitals for the past 12 hrs:   Temp Pulse Resp BP SpO2   02/29/20 2351 98.2 °F (36.8 °C) 76 18 (!) 137/106 100 %       Records Reviewed: Nursing Notes and Old Medical Records    Provider Notes (Medical Decision Making):   Patient presenting with vaginal pain, swelling and vaginal bleeding. Differential includes dysmenorrhea, menorrhagia, IUD issue, vaginitis secondary to recurrent yeast infections. ED Course:   Initial assessment performed. The patients presenting problems have been discussed, and they are in agreement with the care plan formulated and outlined with them. I have encouraged them to ask questions as they arise throughout their visit. ED Course as of Mar 01 0243   Elza Rosy Mar 01, 2020   0223 In speaking with patient further about normal ultrasound and her hyperemic mucosa of the vagina, patient did mention to me that she has been using a perfumed soap. Could be having a contact dermatitis and vaginitis secondary to this. Counseled her on stopping the soap, using vaginocele to help with the pain, and starting on Medrol Dosepak to help with some of the swelling. I would rather her not use a topical steroid in the entire vagina.   She will follow-up with her gynecologist.    [JS] ED Course User Index  [JS] Juliano Ramos MD     Critical Care Time:   0    Disposition:  Discharge Note:  The patient has been re-evaluated and is ready for discharge. Reviewed available results with patient. Counseled patient on diagnosis and care plan. Patient has expressed understanding, and all questions have been answered. Patient agrees with plan and agrees to follow up as recommended, or to return to the ED if their symptoms worsen. Discharge instructions have been provided and explained to the patient, along with reasons to return to the ED. PLAN:  Discharge Medication List as of 3/1/2020  2:16 AM      START taking these medications    Details   benzocaine-resorcinol (VAGISIL) 5-2 % topical cream Apply  to affected area two (2) times daily as needed for Itching., Print, Disp-1 Tube, R-0      methylPREDNISolone (MEDROL, RODDY,) 4 mg tablet Take as directed, Print, Disp-1 Dose Pack, R-0         CONTINUE these medications which have NOT CHANGED    Details   norethindrone-e.estradiol-iron (LO LOESTRIN FE) 1 mg-10 mcg (24)/10 mcg (2) tab Take  by mouth., Historical Med      LORazepam (ATIVAN) 0.5 mg tablet Take 1 Tab by mouth every eight (8) hours as needed for Anxiety. Max Daily Amount: 1.5 mg., Print, Disp-5 Tab, R-0      ibuprofen (MOTRIN IB) 200 mg tablet Take 4 Tabs by mouth every eight (8) hours as needed for Pain., Print, Disp-30 Tab, R-0      venlafaxine-SR (EFFEXOR XR) 37.5 mg capsule Take 1 Cap by mouth daily. , Print, Disp-30 Cap, R-0      ferrous sulfate (SLOW FE PO) Take 1 Tab by mouth., Historical Med      PNV WB.41/DEHTXIY fum/folic ac (PRENATAL PO) Take 1 Tab by mouth., Historical Med      buPROPion SR (WELLBUTRIN SR) 150 mg SR tablet Take 150 mg by mouth daily. , Historical Med      phentermine (ADIPEX-P) 37.5 mg tablet Take 37.5 mg by mouth every morning., Historical Med      vitamin E (AQUA GEMS) 400 unit capsule Take 400 Units by mouth daily. , Historical Med      cetirizine (ZYRTEC) 10 mg tablet Take 1 Tab by mouth daily. , Print, Disp-20 Tab, R-0      propranolol (INDERAL) 20 mg tablet Take 20 mg by mouth daily. , Historical Med      albuterol sulfate 90 mcg/actuation aepb Take 2 Puffs by inhalation four (4) times daily as needed for Cough (SOB, wheezing). , Print, Disp-1 Inhaler, R-0      SUMAtriptan (IMITREX) 25 mg tablet Take 25 mg by mouth once as needed for Migraine., Historical Med           2. Follow-up Information     Follow up With Specialties Details Why Contact Info    [facility] [place] [facility] Emergency Medicine  If symptoms worsen [address]  [address]   [address] [address] [phone]        3. Return to ED if worse     Diagnosis     Clinical Impression:   1. Vaginitis and vulvovaginitis    2. IUD (intrauterine device) in place        Attestations:    Cornelio Albrecht M.D. Please note that this dictation was completed with [employer], the computer voice recognition software. Quite often unanticipated grammatical, syntax, homophones, and other interpretive errors are inadvertently transcribed by the computer software. Please disregard these errors. Please excuse any errors that have escaped final proofreading. Thank you.

## 2020-03-02 LAB
C TRACH DNA SPEC QL NAA+PROBE: NEGATIVE
N GONORRHOEA DNA SPEC QL NAA+PROBE: NEGATIVE
SAMPLE TYPE: NORMAL
SERVICE CMNT-IMP: NORMAL
SPECIMEN SOURCE: NORMAL

## 2020-09-18 ENCOUNTER — TELEPHONE (OUTPATIENT)
Dept: INTERNAL MEDICINE CLINIC | Age: 35
End: 2020-09-18

## 2020-09-18 NOTE — TELEPHONE ENCOUNTER
Was speaking with patient and call was disconnected. Attempted to contact patient back, no answer  Mailbox is full, unable to leave message.   Patient has been scheduled to see Dr. Xu Horan on 09/21/2020 at 11:30am

## 2020-09-18 NOTE — TELEPHONE ENCOUNTER
Called, spoke to pt. Two pt identifiers confirmed. Patient given upcoming appointment information. 9/21/2020 at 11:30 with . Pt verbalized understanding of information discussed w/ no further questions at this time.

## 2020-09-18 NOTE — TELEPHONE ENCOUNTER
Pt has NP appt w/ appa on 9/22 and is unable to do VV - needs in office visit w/ different provider to est care.       Call pt # - [phone]

## 2020-09-21 ENCOUNTER — OFFICE VISIT (OUTPATIENT)
Dept: INTERNAL MEDICINE CLINIC | Age: 35
End: 2020-09-21
Payer: MEDICAID

## 2020-09-21 VITALS
DIASTOLIC BLOOD PRESSURE: 85 MMHG | SYSTOLIC BLOOD PRESSURE: 123 MMHG | HEART RATE: 88 BPM | HEIGHT: 65 IN | TEMPERATURE: 97.1 F | BODY MASS INDEX: 38.99 KG/M2 | WEIGHT: 234 LBS | OXYGEN SATURATION: 100 % | RESPIRATION RATE: 18 BRPM

## 2020-09-21 DIAGNOSIS — Z00.00 ROUTINE MEDICAL EXAM: Primary | ICD-10-CM

## 2020-09-21 DIAGNOSIS — E55.9 VITAMIN D DEFICIENCY: ICD-10-CM

## 2020-09-21 DIAGNOSIS — L30.1 DYSHIDROTIC ECZEMA: ICD-10-CM

## 2020-09-21 DIAGNOSIS — K04.7 DENTAL INFECTION: ICD-10-CM

## 2020-09-21 DIAGNOSIS — F41.9 ANXIETY: ICD-10-CM

## 2020-09-21 DIAGNOSIS — Z23 NEEDS FLU SHOT: ICD-10-CM

## 2020-09-21 DIAGNOSIS — E66.01 SEVERE OBESITY (HCC): ICD-10-CM

## 2020-09-21 DIAGNOSIS — M25.50 ARTHRALGIA, UNSPECIFIED JOINT: ICD-10-CM

## 2020-09-21 DIAGNOSIS — Z82.61 FAMILY HISTORY OF RHEUMATOID ARTHRITIS: ICD-10-CM

## 2020-09-21 PROCEDURE — 90686 IIV4 VACC NO PRSV 0.5 ML IM: CPT

## 2020-09-21 PROCEDURE — 90471 IMMUNIZATION ADMIN: CPT

## 2020-09-21 PROCEDURE — 99385 PREV VISIT NEW AGE 18-39: CPT

## 2020-09-21 RX ORDER — LORAZEPAM 1 MG/1
.5-1 TABLET ORAL
Qty: 30 TAB | Refills: 0 | Status: SHIPPED | OUTPATIENT
Start: 2020-09-21

## 2020-09-21 RX ORDER — HALOBETASOL PROPIONATE 0.5 MG/G
CREAM TOPICAL 2 TIMES DAILY
Qty: 15 G | Refills: 0 | Status: SHIPPED | OUTPATIENT
Start: 2020-09-21 | End: 2020-11-23 | Stop reason: CLARIF

## 2020-09-21 RX ORDER — AMOXICILLIN AND CLAVULANATE POTASSIUM 875; 125 MG/1; MG/1
1 TABLET, FILM COATED ORAL EVERY 12 HOURS
Qty: 20 TAB | Refills: 0 | Status: SHIPPED | OUTPATIENT
Start: 2020-09-21 | End: 2020-11-23 | Stop reason: CLARIF

## 2020-09-21 NOTE — PATIENT INSTRUCTIONS
Office Policies Phone calls/patient messages: Please allow up to 24 hours for someone in the office to contact you about your call or message. Be mindful your provider may be out of the office or your message may require further review. We encourage you to use [employer] for your messages as this is a faster, more efficient way to communicate with our office Medication Refills: 
         
Prescription medications require 48-72 business hours to process. We encourage you to use [employer] for your refills. For controlled medications: Please allow 72 business hours to process. Certain medications may require you to  a written prescription at our office. NO narcotic/controlled medications will be prescribed after 4pm Monday through Friday or on weekends Form/Paperwork Completion: 
         
Please note a $25 fee may incur for all paperwork for completed by our providers. We ask that you allow 7-10 business days. Pre-payment is due prior to picking up/faxing the completed form. You may also download your forms to [employer] to have your doctor print off.

## 2020-09-21 NOTE — PROGRESS NOTES
HPI: Gladys Lane is a 38 y.o. female presents to establish. Sees gyn. G32 8years old son (autistic), 21 months son. Not . Lives with mother. At home with kids. Coping with stress ok. Has had panic in the past. Takes ativan 0.5mg prn. Last filled Feb 2019. In ED in February for bleeding from IUD. It was removed one month ago, bleeding has not stopped, to have D & C. To have BTL. Has ovarian cysts. Pregnancy related HTN. Diagnosed with lupus age 10, recheck negative. Saw rheumatologist x 2. Reports problems with fatigue, headaches- attributed to sinus, mood swings. Started on phentermine for weight loss by gyn. Is trying to be more active. Reports joint pain. Hip pain. No joint swelling. Has a dental infection, broken tooth. To see dentist for extraction. Reports rash on fingers with bubbles. Comes and goes.        ROS:  As per HPI    Past Medical History:   Diagnosis Date    Abnormal Papanicolaou smear of cervix     HPV    Anemia     Bilateral ovarian cysts     Essential hypertension     preeclampsia with first pregnancy    GERD (gastroesophageal reflux disease)     Gestational hypertension     with first pregnancy     [address] [address]     diagnosed at 11years of age   [facility] Migraine     Neurological disorder     migraines     Other ill-defined conditions(799.89)     lupus    Postpartum depression     prescribed effexor     Psychiatric disorder     anxiety/depression- was taking ativan prior to pregnancy     Past Surgical History:   Procedure Laterality Date    COLONOSCOPY N/A 3/14/2017    COLON[facility] Karrie Bustillo [facility]  3/14/2017         HX GI      LAPAROSCOPY    HX HEENT  4/4/16    sinus surgery    HX HERNIA REPAIR      BILATERAL INGUINAL HERNIA REPAIR    HX OTHER SURGICAL      hemmorhoid, hernia-groin as an infant     HX OTHER SURGICAL      sinus surgery      Social History     Socioeconomic History    Marital status: SINGLE     Spouse name: Not on file    Number of children: Not on file    Years of education: Not on file    Highest education level: Not on file   Tobacco Use    Smoking status: Former Smoker     Years: 1.00     Last attempt to quit: 3/31/2014     Years since quittin.4    Smokeless tobacco: Never Used   Substance and Sexual Activity    Alcohol use: No    Drug use: No    Sexual activity: Not Currently     Partners: Male   Other Topics Concern     Family History   Problem Relation Age of Onset    Breast Cancer Paternal Grandmother     Anesth Problems Neg Hx      Current Outpatient Medications   Medication Sig Dispense Refill    LORazepam (ATIVAN) 1 mg tablet Take 0.5-1 Tabs by mouth two (2) times daily as needed for Anxiety. Max Daily Amount: 2 mg. 30 Tab 0    amoxicillin-clavulanate (AUGMENTIN) 875-125 mg per tablet Take 1 Tab by mouth every twelve (12) hours. 20 Tab 0    halobetasol (ULTRAVATE) 0.05 % topical cream Apply  to affected area two (2) times a day. use thin layer 15 g 0    benzocaine-resorcinol (VAGISIL) 5-2 % topical cream Apply  to affected area two (2) times daily as needed for Itching. 1 Tube 0    ibuprofen (MOTRIN IB) 200 mg tablet Take 4 Tabs by mouth every eight (8) hours as needed for Pain. 30 Tab 0    phentermine (ADIPEX-P) 37.5 mg tablet Take 37.5 mg by mouth every morning.  cetirizine (ZYRTEC) 10 mg tablet Take 1 Tab by mouth daily. 20 Tab 0    SUMAtriptan (IMITREX) 25 mg tablet Take 25 mg by mouth once as needed for Migraine.  norethindrone-e.estradiol-iron (LO LOESTRIN FE) 1 mg-10 mcg (24)/10 mcg (2) tab Take  by mouth.  venlafaxine-SR (EFFEXOR XR) 37.5 mg capsule Take 1 Cap by mouth daily. 30 Cap 0    ferrous sulfate (SLOW FE PO) Take 1 Tab by mouth.  PNV PF.69/JIWDNDB fum/folic ac (PRENATAL PO) Take 1 Tab by mouth.  buPROPion SR (WELLBUTRIN SR) 150 mg SR tablet Take 150 mg by mouth daily.       vitamin E (AQUA GEMS) 400 unit capsule Take 400 Units by mouth daily.  propranolol (INDERAL) 20 mg tablet Take 20 mg by mouth daily.  albuterol sulfate 90 mcg/actuation aepb Take 2 Puffs by inhalation four (4) times daily as needed for Cough (SOB, wheezing). 1 Inhaler 0     Allergies   Allergen Reactions    Morphine Other (comments)     Pt states it makes her \"very angry\"    Compazine [Prochlorperazine Edisylate] Other (comments)     Patient felt very anxious and apprehensive after IV dose.  Percocet [Oxycodone-Acetaminophen] Nausea and Vomiting    Reglan [Metoclopramide] Other (comments)     anxious    Zofran [Ondansetron Hcl (Pf)] Other (comments)     Per patient \"makes me edgy\"         Physical exam:  Visit Vitals  /85 (BP 1 Location: Left arm, BP Patient Position: Sitting)   Pulse 88   Temp 97.1 °F (36.2 °C) (Temporal)   Resp 18   Ht 5' 5\" (1.651 m)   Wt 234 lb (106.1 kg)   LMP 08/15/2020 (Approximate)   SpO2 100%   Breastfeeding No   BMI 38.94 kg/m²     General appearance - alert, well appearing, and in no distress  HEENT- PERLL,normal conjunctiva, TM normal bilaterally, hearing grossly normal, normal nasal turbinates, no sinus tenderness, mucous membranes moist, pharynx normal without lesions  Neck - supple, no significant adenopathy   Pulm- clear to auscultation, no wheezes, rales or rhonchi  CV- normal rate, regular rhythm, normal S1, S2, no murmurs   Abdomen - soft, nontender, nondistended, no masses or organomegaly  Extrem-peripheral pulses normal, no pedal edema  Skin-Warm and dry, no rashes  Neuro -alert, oriented,nonfocal      Assessment/Plan:      ICD-10-CM ICD-9-CM    1. Routine medical exam  S87.97 V76.1 METABOLIC PANEL, COMPREHENSIVE      CBC W/O DIFF      LIPID PANEL      VITAMIN D, 25 HYDROXY      URINALYSIS W/ RFLX MICROSCOPIC      TSH 3RD GENERATION      HEMOGLOBIN A1C W/O EAG   2. Needs flu shot  Z23 V04.81 INFLUENZA VIRUS VAC QUAD,SPLIT,PRESV FREE SYRINGE IM   3.  Severe obesity (HCC)  E66.01 278.01 4. Anxiety  F41.9 300.00 LORazepam (ATIVAN) 1 mg tablet   5. Arthralgia, unspecified joint  S21.10 632.06 METABOLIC PANEL, COMPREHENSIVE      CBC W/O DIFF      VITAMIN D, 25 HYDROXY      TSH 3RD GENERATION      ISAAK COMPREHENSIVE PANEL      RHEUMATOID FACTOR, QL   6. Family history of rheumatoid arthritis  Z82.61 V17.7 RHEUMATOID FACTOR, QL   7. Vitamin D deficiency  E55.9 268.9 VITAMIN D, 25 HYDROXY   8. Dental infection  K04.7 522.4 amoxicillin-clavulanate (AUGMENTIN) 875-125 mg per tablet   9. Dyshidrotic eczema  L30.1 705.81 halobetasol (ULTRAVATE) 0.05 % topical cream   Recommend heart healthy diet and regular cardiovascular exercise. Follow-up and Dispositions    · Return for follow up pending labs and 6 months.  Tod Rose MD

## 2020-09-22 DIAGNOSIS — Z82.61 FAMILY HISTORY OF RHEUMATOID ARTHRITIS: ICD-10-CM

## 2020-09-22 DIAGNOSIS — M25.50 ARTHRALGIA, UNSPECIFIED JOINT: ICD-10-CM

## 2020-09-22 DIAGNOSIS — E55.9 VITAMIN D DEFICIENCY: ICD-10-CM

## 2020-09-22 DIAGNOSIS — Z00.00 ROUTINE MEDICAL EXAM: ICD-10-CM

## 2020-09-22 LAB
25(OH)D3+25(OH)D2 SERPL-MCNC: 30.8 NG/ML (ref 30–100)
ALBUMIN SERPL-MCNC: 4.1 G/DL (ref 3.8–4.8)
ALBUMIN/GLOB SERPL: 1.6 {RATIO} (ref 1.2–2.2)
ALP SERPL-CCNC: 81 IU/L (ref 39–117)
ALT SERPL-CCNC: 20 IU/L (ref 0–32)
APPEARANCE UR: CLEAR
AST SERPL-CCNC: 23 IU/L (ref 0–40)
BILIRUB SERPL-MCNC: 0.3 MG/DL (ref 0–1.2)
BILIRUB UR QL STRIP: NEGATIVE
BUN SERPL-MCNC: 9 MG/DL (ref 6–20)
BUN/CREAT SERPL: 13 (ref 9–23)
CALCIUM SERPL-MCNC: 9.1 MG/DL (ref 8.7–10.2)
CENTROMERE B AB SER-ACNC: <0.2 AI (ref 0–0.9)
CHLORIDE SERPL-SCNC: 103 MMOL/L (ref 96–106)
CHOLEST SERPL-MCNC: 162 MG/DL (ref 100–199)
CHROMATIN AB SERPL-ACNC: <0.2 AI (ref 0–0.9)
CO2 SERPL-SCNC: 23 MMOL/L (ref 20–29)
COLOR UR: YELLOW
CREAT SERPL-MCNC: 0.69 MG/DL (ref 0.57–1)
DSDNA AB SER-ACNC: 1 IU/ML (ref 0–9)
ENA JO1 AB SER-ACNC: <0.2 AI (ref 0–0.9)
ENA RNP AB SER-ACNC: <0.2 AI (ref 0–0.9)
ENA SCL70 AB SER-ACNC: <0.2 AI (ref 0–0.9)
ENA SM AB SER-ACNC: <0.2 AI (ref 0–0.9)
ENA SS-A AB SER-ACNC: 0.4 AI (ref 0–0.9)
ENA SS-B AB SER-ACNC: <0.2 AI (ref 0–0.9)
ERYTHROCYTE [DISTWIDTH] IN BLOOD BY AUTOMATED COUNT: 13.1 % (ref 11.7–15.4)
GLOBULIN SER CALC-MCNC: 2.6 G/DL (ref 1.5–4.5)
GLUCOSE SERPL-MCNC: 90 MG/DL (ref 65–99)
GLUCOSE UR QL: NEGATIVE
HBA1C MFR BLD: 5.8 % (ref 4.8–5.6)
HCT VFR BLD AUTO: 37.9 % (ref 34–46.6)
HDLC SERPL-MCNC: 40 MG/DL
HGB BLD-MCNC: 12.5 G/DL (ref 11.1–15.9)
HGB UR QL STRIP: NEGATIVE
KETONES UR QL STRIP: NEGATIVE
LDLC SERPL CALC-MCNC: 100 MG/DL (ref 0–99)
LEUKOCYTE ESTERASE UR QL STRIP: NEGATIVE
MCH RBC QN AUTO: 27.3 PG (ref 26.6–33)
MCHC RBC AUTO-ENTMCNC: 33 G/DL (ref 31.5–35.7)
MCV RBC AUTO: 83 FL (ref 79–97)
MICRO URNS: NORMAL
NITRITE UR QL STRIP: NEGATIVE
PH UR STRIP: 6.5 [PH] (ref 5–7.5)
PLATELET # BLD AUTO: 269 X10E3/UL (ref 150–450)
POTASSIUM SERPL-SCNC: 4.1 MMOL/L (ref 3.5–5.2)
PROT SERPL-MCNC: 6.7 G/DL (ref 6–8.5)
PROT UR QL STRIP: NEGATIVE
RBC # BLD AUTO: 4.58 X10E6/UL (ref 3.77–5.28)
RHEUMATOID FACT SERPL-ACNC: <10 IU/ML (ref 0–13.9)
SEE BELOW, 164869: NORMAL
SODIUM SERPL-SCNC: 138 MMOL/L (ref 134–144)
SP GR UR: 1.01 (ref 1–1.03)
TRIGL SERPL-MCNC: 120 MG/DL (ref 0–149)
TSH SERPL DL<=0.005 MIU/L-ACNC: 1.97 UIU/ML (ref 0.45–4.5)
UROBILINOGEN UR STRIP-MCNC: 0.2 MG/DL (ref 0.2–1)
VLDLC SERPL CALC-MCNC: 22 MG/DL (ref 5–40)
WBC # BLD AUTO: 8.3 X10E3/UL (ref 3.4–10.8)

## 2020-11-23 NOTE — PROGRESS NOTES
Spoke with Angeles Stewart at Dr Marissa Garcia office about faxing orders to me. Fax number provided.

## 2020-11-23 NOTE — PERIOP NOTES
[facility]  Preoperative Instructions        Surgery Date 12/3/20          Time of Arrival 1100    1. On the day of your surgery, please report to the Surgical Services Registration Desk and sign in at your designated time. The Surgery Center is located to the right of the Emergency Room. 2. You must have someone with you to drive you home. You should not drive a car for 24 hours following surgery. Please make arrangements for a friend or family member to stay with you for the first 24 hours after your surgery. 3. Do not have anything to eat or drink (including water, gum, mints, coffee, juice) after midnight ?12/2/20? Nilesh Hires ? This may not apply to medications prescribed by your physician. ?(Please note below the special instructions with medications to take the morning of your procedure.)    4. We recommend you do not drink any alcoholic beverages for 24 hours before and after your surgery. 5. Contact your surgeons office for instructions on the following medications: non-steroidal anti-inflammatory drugs (i.e. Advil, Aleve), vitamins, and supplements. (Some surgeons will want you to stop these medications prior to surgery and others may allow you to take them)  **If you are currently taking Plavix, Coumadin, Aspirin and/or other blood-thinning agents, contact your surgeon for instructions. ** Your surgeon will partner with the physician prescribing these medications to determine if it is safe to stop or if you need to continue taking. Please do not stop taking these medications without instructions from your surgeon    6. Wear comfortable clothes. Wear glasses instead of contacts. Do not bring any money or jewelry. Please bring picture ID, insurance card, and any prearranged co-payment or hospital payment. Do not wear make-up, particularly mascara the morning of your surgery. Do not wear nail polish, particularly if you are having foot /hand surgery.   Wear your hair loose or down, no ponytails, buns, óscar pins or clips. All body piercings must be removed. Please shower with antibacterial soap for three consecutive days before and on the morning of surgery, but do not apply any lotions, powders or deodorants after the shower on the day of surgery. Please use a fresh towels after each shower. Please sleep in clean clothes and change bed linens the night before surgery. Please do not shave for 48 hours prior to surgery. Shaving of the face is acceptable. 7. You should understand that if you do not follow these instructions your surgery may be cancelled. If your physical condition changes (I.e. fever, cold or flu) please contact your surgeon as soon as possible. 8. It is important that you be on time. If a situation occurs where you may be late, please call [phone] (OR Holding Area). 9. If you have any questions and or problems, please call [phone] (Pre-admission Testing). 10. Your surgery time may be subject to change. You will receive a phone call the evening prior if your time changes. 11.  If having outpatient surgery, you must have someone to drive you here, stay with you during the duration of your stay, and to drive you home at time of discharge. Special Instructions:     TAKE ALL MEDICATIONS DAY OF SURGERY EXCEPT: only take zyrtec or inhaler if needed      I understand a pre-operative phone call will be made to verify my surgery time. In the event that I am not available, I give permission for a message to be left on my answering service and/or with another person?   Yes [phone]         ___________________      __________   _________    (Signature of Patient)             (Witness)                (Date and Time)

## 2020-11-29 ENCOUNTER — HOSPITAL ENCOUNTER (OUTPATIENT)
Dept: PREADMISSION TESTING | Age: 35
Discharge: HOME | End: 2020-11-29
Payer: MEDICAID

## 2020-11-29 PROCEDURE — 87635 SARS-COV-2 COVID-19 AMP PRB: CPT

## 2020-12-01 LAB — SARS-COV-2, COV2NT: NOT DETECTED

## 2020-12-03 ENCOUNTER — ANESTHESIA (OUTPATIENT)
Dept: SURGERY | Age: 35
End: 2020-12-03
Payer: MEDICAID

## 2020-12-03 ENCOUNTER — HOSPITAL ENCOUNTER (OUTPATIENT)
Age: 35
Setting detail: OUTPATIENT SURGERY
Discharge: HOME | End: 2020-12-03
Attending: SPECIALIST | Admitting: SPECIALIST
Payer: MEDICAID

## 2020-12-03 ENCOUNTER — ANESTHESIA EVENT (OUTPATIENT)
Dept: SURGERY | Age: 35
End: 2020-12-03
Payer: MEDICAID

## 2020-12-03 VITALS
TEMPERATURE: 97.7 F | WEIGHT: 228.84 LBS | RESPIRATION RATE: 15 BRPM | SYSTOLIC BLOOD PRESSURE: 117 MMHG | BODY MASS INDEX: 38.13 KG/M2 | HEART RATE: 55 BPM | HEIGHT: 65 IN | DIASTOLIC BLOOD PRESSURE: 65 MMHG | OXYGEN SATURATION: 100 %

## 2020-12-03 LAB — HCG UR QL: NEGATIVE

## 2020-12-03 PROCEDURE — 74011250637 HC RX REV CODE- 250/637: Performed by: ANESTHESIOLOGY

## 2020-12-03 PROCEDURE — 76210000006 HC OR PH I REC 0.5 TO 1 HR: Performed by: SPECIALIST

## 2020-12-03 PROCEDURE — 77030040361 HC SLV COMPR DVT MDII -B: Performed by: SPECIALIST

## 2020-12-03 PROCEDURE — 74011250637 HC RX REV CODE- 250/637: Performed by: SPECIALIST

## 2020-12-03 PROCEDURE — 88305 TISSUE EXAM BY PATHOLOGIST: CPT

## 2020-12-03 PROCEDURE — 77030003666 HC NDL SPINAL BD -A: Performed by: SPECIALIST

## 2020-12-03 PROCEDURE — 2709999900 HC NON-CHARGEABLE SUPPLY: Performed by: SPECIALIST

## 2020-12-03 PROCEDURE — 77030018836 HC SOL IRR NACL ICUM -A: Performed by: SPECIALIST

## 2020-12-03 PROCEDURE — 76210000020 HC REC RM PH II FIRST 0.5 HR: Performed by: SPECIALIST

## 2020-12-03 PROCEDURE — 74011250636 HC RX REV CODE- 250/636: Performed by: ANESTHESIOLOGY

## 2020-12-03 PROCEDURE — 81025 URINE PREGNANCY TEST: CPT

## 2020-12-03 PROCEDURE — 74011250636 HC RX REV CODE- 250/636: Performed by: NURSE ANESTHETIST, CERTIFIED REGISTERED

## 2020-12-03 PROCEDURE — 77030033136 HC TBNG INFLO AQUILEX ST HOLO -C: Performed by: SPECIALIST

## 2020-12-03 PROCEDURE — 74011250637 HC RX REV CODE- 250/637: Performed by: NURSE ANESTHETIST, CERTIFIED REGISTERED

## 2020-12-03 PROCEDURE — 76010000138 HC OR TIME 0.5 TO 1 HR: Performed by: SPECIALIST

## 2020-12-03 PROCEDURE — 76060000032 HC ANESTHESIA 0.5 TO 1 HR: Performed by: SPECIALIST

## 2020-12-03 PROCEDURE — 77030019905 HC CATH URETH INTMIT MDII -A: Performed by: SPECIALIST

## 2020-12-03 PROCEDURE — 77030033137 HC TBNG OUTFLO AQUILEX ST HOLO -B: Performed by: SPECIALIST

## 2020-12-03 RX ORDER — SODIUM CHLORIDE 0.9 % (FLUSH) 0.9 %
5-40 SYRINGE (ML) INJECTION AS NEEDED
Status: DISCONTINUED | OUTPATIENT
Start: 2020-12-03 | End: 2020-12-03 | Stop reason: HOSPADM

## 2020-12-03 RX ORDER — FENTANYL CITRATE 50 UG/ML
INJECTION, SOLUTION INTRAMUSCULAR; INTRAVENOUS AS NEEDED
Status: DISCONTINUED | OUTPATIENT
Start: 2020-12-03 | End: 2020-12-03 | Stop reason: HOSPADM

## 2020-12-03 RX ORDER — SODIUM CHLORIDE 0.9 % (FLUSH) 0.9 %
5-40 SYRINGE (ML) INJECTION EVERY 8 HOURS
Status: DISCONTINUED | OUTPATIENT
Start: 2020-12-03 | End: 2020-12-03 | Stop reason: HOSPADM

## 2020-12-03 RX ORDER — SCOLOPAMINE TRANSDERMAL SYSTEM 1 MG/1
PATCH, EXTENDED RELEASE TRANSDERMAL AS NEEDED
Status: DISCONTINUED | OUTPATIENT
Start: 2020-12-03 | End: 2020-12-03 | Stop reason: HOSPADM

## 2020-12-03 RX ORDER — DEXAMETHASONE SODIUM PHOSPHATE 4 MG/ML
INJECTION, SOLUTION INTRA-ARTICULAR; INTRALESIONAL; INTRAMUSCULAR; INTRAVENOUS; SOFT TISSUE AS NEEDED
Status: DISCONTINUED | OUTPATIENT
Start: 2020-12-03 | End: 2020-12-03 | Stop reason: HOSPADM

## 2020-12-03 RX ORDER — FENTANYL CITRATE 50 UG/ML
25 INJECTION, SOLUTION INTRAMUSCULAR; INTRAVENOUS
Status: DISCONTINUED | OUTPATIENT
Start: 2020-12-03 | End: 2020-12-03 | Stop reason: HOSPADM

## 2020-12-03 RX ORDER — KETOROLAC TROMETHAMINE 30 MG/ML
INJECTION, SOLUTION INTRAMUSCULAR; INTRAVENOUS AS NEEDED
Status: DISCONTINUED | OUTPATIENT
Start: 2020-12-03 | End: 2020-12-03 | Stop reason: HOSPADM

## 2020-12-03 RX ORDER — ACETAMINOPHEN 500 MG
1000 TABLET ORAL ONCE
Status: COMPLETED | OUTPATIENT
Start: 2020-12-03 | End: 2020-12-03

## 2020-12-03 RX ORDER — SODIUM CHLORIDE, SODIUM LACTATE, POTASSIUM CHLORIDE, CALCIUM CHLORIDE 600; 310; 30; 20 MG/100ML; MG/100ML; MG/100ML; MG/100ML
INJECTION, SOLUTION INTRAVENOUS
Status: DISCONTINUED | OUTPATIENT
Start: 2020-12-03 | End: 2020-12-03

## 2020-12-03 RX ORDER — PROPOFOL 10 MG/ML
INJECTION, EMULSION INTRAVENOUS AS NEEDED
Status: DISCONTINUED | OUTPATIENT
Start: 2020-12-03 | End: 2020-12-03 | Stop reason: HOSPADM

## 2020-12-03 RX ORDER — SODIUM CHLORIDE, SODIUM LACTATE, POTASSIUM CHLORIDE, CALCIUM CHLORIDE 600; 310; 30; 20 MG/100ML; MG/100ML; MG/100ML; MG/100ML
25 INJECTION, SOLUTION INTRAVENOUS CONTINUOUS
Status: DISCONTINUED | OUTPATIENT
Start: 2020-12-03 | End: 2020-12-03 | Stop reason: HOSPADM

## 2020-12-03 RX ORDER — PROPOFOL 10 MG/ML
INJECTION, EMULSION INTRAVENOUS
Status: DISCONTINUED | OUTPATIENT
Start: 2020-12-03 | End: 2020-12-03 | Stop reason: HOSPADM

## 2020-12-03 RX ORDER — MIDAZOLAM HYDROCHLORIDE 1 MG/ML
INJECTION, SOLUTION INTRAMUSCULAR; INTRAVENOUS AS NEEDED
Status: DISCONTINUED | OUTPATIENT
Start: 2020-12-03 | End: 2020-12-03 | Stop reason: HOSPADM

## 2020-12-03 RX ORDER — ACETAMINOPHEN 325 MG/1
650 TABLET ORAL ONCE
Status: DISCONTINUED | OUTPATIENT
Start: 2020-12-03 | End: 2020-12-03

## 2020-12-03 RX ORDER — OXYCODONE HYDROCHLORIDE 5 MG/1
5 TABLET ORAL AS NEEDED
Status: DISCONTINUED | OUTPATIENT
Start: 2020-12-03 | End: 2020-12-03 | Stop reason: HOSPADM

## 2020-12-03 RX ADMIN — Medication 3 AMPULE: at 12:03

## 2020-12-03 RX ADMIN — KETOROLAC TROMETHAMINE 30 MG: 30 INJECTION, SOLUTION INTRAMUSCULAR; INTRAVENOUS at 13:37

## 2020-12-03 RX ADMIN — PROPOFOL 100 MCG/KG/MIN: 10 INJECTION, EMULSION INTRAVENOUS at 13:21

## 2020-12-03 RX ADMIN — Medication 1000 MG: at 13:07

## 2020-12-03 RX ADMIN — FENTANYL CITRATE 25 MCG: 50 INJECTION, SOLUTION INTRAMUSCULAR; INTRAVENOUS at 13:22

## 2020-12-03 RX ADMIN — FENTANYL CITRATE 25 MCG: 50 INJECTION, SOLUTION INTRAMUSCULAR; INTRAVENOUS at 13:13

## 2020-12-03 RX ADMIN — PROPOFOL 70 MG: 10 INJECTION, EMULSION INTRAVENOUS at 13:20

## 2020-12-03 RX ADMIN — FENTANYL CITRATE 25 MCG: 50 INJECTION, SOLUTION INTRAMUSCULAR; INTRAVENOUS at 13:35

## 2020-12-03 RX ADMIN — DEXAMETHASONE SODIUM PHOSPHATE 8 MG: 4 INJECTION, SOLUTION INTRAMUSCULAR; INTRAVENOUS at 13:29

## 2020-12-03 RX ADMIN — PROPOFOL 30 MG: 10 INJECTION, EMULSION INTRAVENOUS at 13:30

## 2020-12-03 RX ADMIN — SCOPALAMINE 1 PATCH: 1 PATCH, EXTENDED RELEASE TRANSDERMAL at 13:31

## 2020-12-03 RX ADMIN — FENTANYL CITRATE 25 MCG: 50 INJECTION, SOLUTION INTRAMUSCULAR; INTRAVENOUS at 13:30

## 2020-12-03 RX ADMIN — SODIUM CHLORIDE, SODIUM LACTATE, POTASSIUM CHLORIDE, AND CALCIUM CHLORIDE 25 ML/HR: 600; 310; 30; 20 INJECTION, SOLUTION INTRAVENOUS at 12:03

## 2020-12-03 RX ADMIN — MIDAZOLAM HYDROCHLORIDE 2 MG: 1 INJECTION, SOLUTION INTRAMUSCULAR; INTRAVENOUS at 13:13

## 2020-12-03 NOTE — PERIOP NOTES
For dc home. Vswnl. Denies pain. peripad in place w/out drainage. Went over [employer] instructions w/pt and mother including Rx and f/up. Verbalized understanding. dc'd home.

## 2020-12-03 NOTE — PERIOP NOTES
1127 -  PT'S COVID TEST RESULTED NEG. PT STATES HAS QUARANTINED SINCE TESTING. PT DENIES S/S OF COVID - NO FEVER, COLD, COUGH, N/V, SOB, DIARRHEA. ... PRE-OP TCHING DONE - PT VERBALIZES UNDERSTANDING. STRETCHER IN LOWEST POSITION, CB IN PLACE SR UP X2.

## 2020-12-03 NOTE — ANESTHESIA POSTPROCEDURE EVALUATION
Procedure(s): HYSTEROSCOPY, DILATION AND CURETTAGE.    total IV anesthesia, MAC    Anesthesia Post Evaluation        Patient location during evaluation: PACU  Note status: Adequate. Level of consciousness: responsive to verbal stimuli and sleepy but conscious  Pain management: satisfactory to patient  Airway patency: patent  Anesthetic complications: no  Cardiovascular status: acceptable  Respiratory status: acceptable  Hydration status: acceptable  Comments: +Post-Anesthesia Evaluation and Assessment    Patient: Robert Lowe MRN: [id number]  SSN: [id number]   YOB: 1985  Age: 38 y.o. Sex: female      Cardiovascular Function/Vital Signs    /72   Pulse 73   Temp 36.5 °C (97.7 °F)   Resp 15   Ht 5' 5\" (1.651 m)   Wt 103.8 kg (228 lb 13.4 oz)   SpO2 99%   BMI 38.08 kg/m²     Patient is status post Procedure(s): HYSTEROSCOPY, DILATION AND CURETTAGE. Nausea/Vomiting: Controlled. Postoperative hydration reviewed and adequate. Pain:  Pain Scale 1: Numeric (0 - 10) (12/03/20 1430)  Pain Intensity 1: 0 (12/03/20 1430)   Managed. Neurological Status:   Neuro (WDL): Exceptions to WDL (12/03/20 1347)   At baseline. Mental Status and Level of Consciousness: Arousable. Pulmonary Status:   O2 Device: Room air (12/03/20 1430)   Adequate oxygenation and airway patent. Complications related to anesthesia: None    Post-anesthesia assessment completed. No concerns. Signed By: Yair Kathleen MD    12/3/2020  Post anesthesia nausea and vomiting:  controlled      INITIAL Post-op Vital signs:   Vitals Value Taken Time   /72 12/3/2020  2:15 PM   Temp 36.5 °C (97.7 °F) 12/3/2020  1:48 PM   Pulse 76 12/3/2020  2:30 PM   Resp 12 12/3/2020  2:30 PM   SpO2 100 % 12/3/2020  2:30 PM   Vitals shown include unvalidated device data.

## 2020-12-03 NOTE — ANESTHESIA PREPROCEDURE EVALUATION
Relevant Problems   No relevant active problems       Anesthetic History     PONV          Review of Systems / Medical History  Patient summary reviewed, nursing notes reviewed and pertinent labs reviewed    Pulmonary                   Neuro/Psych         Headaches and psychiatric history     Cardiovascular    Hypertension              Exercise tolerance: >4 METS     GI/Hepatic/Renal     GERD           Endo/Other        Obesity     Other Findings   Comments: Lupus    diagnosed at 11years of age           Physical Exam    Airway  Mallampati: II  TM Distance: 4 - 6 cm  Neck ROM: normal range of motion   Mouth opening: Normal     Cardiovascular  Regular rate and rhythm,  S1 and S2 normal,  no murmur, click, rub, or gallop  Rhythm: regular  Rate: normal         Dental    Dentition: Poor dentition     Pulmonary  Breath sounds clear to auscultation               Abdominal  GI exam deferred       Other Findings            Anesthetic Plan    ASA: 2  Anesthesia type: general and total IV anesthesia    Monitoring Plan: BIS      Induction: Intravenous  Anesthetic plan and risks discussed with: Patient      Allergic to multiple antinausea medications, pt ok with phenergan in the past

## 2020-12-03 NOTE — H&P
Gynecology History and Physical    Name: Jia Jenkins MRN: [id number] [id number]: [id number]    YOB: 1985  Age: 38 y.o. Sex: female       Subjective:      Chief complaint:  Abnormal uterine bleeding    Chalino Dickerson is a 38 y.o.  female with a history of abnormal uterine bleeding. Previous workup included Ultrasound which revealed a thickened endometrium. Previous treatment measures included none. She is admitted for Procedure(s) (LRB):  HYSTEROSCOPY, DILATION AND CURETTAGE (N/A). The current method of family planning is none.     OB History        2    Para   1    Term   1            AB        Living           SAB        TAB        Ectopic        Molar        Multiple        Live Births                  Past Medical History:   Diagnosis Date    Abnormal Papanicolaou smear of cervix     HPV    Anemia     Bilateral ovarian cysts     Essential hypertension     preeclampsia with first pregnancy    GERD (gastroesophageal reflux disease)     Gestational hypertension     with first pregnancy     Lupus ([facility] [address]     diagnosed at 11years of age- systemic lupus-pt doesnt see anyone for it and she said it must be mild form    Migraine     Neurological disorder     migraines     Other ill-defined conditions(799.89)     lupus    Postpartum depression     prescribed effexor     Psychiatric disorder     anxiety/depression- was taking ativan prior to pregnancy     Past Surgical History:   Procedure Laterality Date    COLONOSCOPY N/A 3/14/2017    COLONOSCOPY performed by [facility]a [facility] at [facility]OSCOPY    COLONOSCOPY,DIAGNOSTIC  3/14/2017         HX GI      LAPAROSCOPY    HX HEENT  14    sinus surgery    HX HERNIA REPAIR      BILATERAL INGUINAL HERNIA REPAIR- 10weeks old    HX OTHER SURGICAL      hemmorhoid, hernia-groin as an infant     HX OTHER SURGICAL      sinus surgery      Social History     Occupational History    Not on file   Tobacco Use    Smoking status: Former Smoker Years: 1.00     Last attempt to quit: 3/31/2014     Years since quittin.6    Smokeless tobacco: Never Used   Substance and Sexual Activity    Alcohol use: No    Drug use: No    Sexual activity: Not Currently     Partners: Male     Family History   Problem Relation Age of Onset    Breast Cancer Paternal Grandmother     Anesth Problems Neg Hx         Allergies   Allergen Reactions    Morphine Other (comments)     Pt states it makes her \"very angry\"    Compazine [Prochlorperazine Edisylate] Other (comments)     Patient felt very anxious and apprehensive after IV dose.  Percocet [Oxycodone-Acetaminophen] Nausea and Vomiting    Reglan [Metoclopramide] Other (comments)     anxious    Zofran [Ondansetron Hcl (Pf)] Other (comments)     Per patient \"makes me edgy\"     Prior to Admission medications    Medication Sig Start Date End Date Taking? Authorizing Provider   phentermine (ADIPEX-P) 37.5 mg tablet Take 37.5 mg by mouth every morning. Yes Provider, Historical   cetirizine (ZYRTEC) 10 mg tablet Take 1 Tab by mouth daily. 17  Yes Juan Claudio PA   SUMAtriptan (IMITREX) 25 mg tablet Take 25 mg by mouth once as needed for Migraine. Yes Juana, MD Severiano   LORazepam (ATIVAN) 1 mg tablet Take 0.5-1 Tabs by mouth two (2) times daily as needed for Anxiety. Max Daily Amount: 2 mg. 20   Yasmine Maciel MD   benzocaine-resorcinol (VAGISIL) 5-2 % topical cream Apply  to affected area two (2) times daily as needed for Itching. 3/1/20   Andressa Quiñonez MD   ibuprofen (MOTRIN IB) 200 mg tablet Take 4 Tabs by mouth every eight (8) hours as needed for Pain. 2/10/19   Marcos Lockhart MD   ferrous sulfate (SLOW FE PO) Take 1 Tab by mouth as needed. Provider, Historical   vitamin E (AQUA GEMS) 400 unit capsule Take 400 Units by mouth daily. Provider, Historical   albuterol sulfate 90 mcg/actuation aepb Take 2 Puffs by inhalation four (4) times daily as needed for Cough (SOB, wheezing).  9/3/16 Nallely Robertson NP        Review of Systems:  A comprehensive review of systems was negative except for that written in the History of Present Illness. Objective:     Vitals:    11/23/20 0902 12/03/20 1127   BP:  126/82   Pulse:  68   Resp:  14   Temp:  98 °F (36.7 °C)   SpO2:  99%   Weight: 102.1 kg (225 lb) 103.8 kg (228 lb 13.4 oz)   Height: 5' 5\" (1.651 m) 5' 5\" (1.651 m)       Physical Exam:  Deferred    Assessment:     Active Problems:    * No active hospital problems. *     Abnormal uterine bleeding with thickened endometrium    Plan:     Procedure(s) (LRB):  HYSTEROSCOPY, DILATION AND CURETTAGE (N/A)  Discussed the risks of surgery including the risks of bleeding, infection, deep vein thrombosis, and surgical injuries to internal organs including but not limited to the bowels, bladder, rectum, and female reproductive organs. The patient understands the risks; any and all questions were answered to the patient's satisfaction.

## 2020-12-03 NOTE — DISCHARGE INSTRUCTIONS
Patient Education        Dilation and Curettage: What to Expect at Home  Your Recovery  Dilation and curettage (D&C) is a procedure to remove tissue from the inside of the uterus. The doctor used a curved tool, called a curette, to gently scrape tissue from your uterus. You are likely to have a backache, or cramps similar to menstrual cramps, and pass small clots of blood from your vagina for the first few days. You may have light vaginal bleeding for several weeks after the procedure. You will probably be able to go back to most of your normal activities in 1 or 2 days. This care sheet gives you a general idea about how long it will take for you to recover. But each person recovers at a different pace. Follow the steps below to get better as quickly as possible. How can you care for yourself at home? Activity    · Rest when you feel tired. Getting enough sleep will help you recover.     · Most women are able to return to work the day after the procedure.     · You may have some light vaginal bleeding. Use sanitary pads until you stop bleeding. Using pads makes it easier to monitor your bleeding. Do not rinse your vagina with fluid (douche).   · Ask your doctor when it is okay for you to have sex. Diet    · You can eat your normal diet. If your stomach is upset, try bland, low-fat foods like plain rice, broiled chicken, toast, and yogurt.     · Drink plenty of fluids (unless your doctor tells you not to). Medicines    · Your doctor will tell you if and when you can restart your medicines. You will also get instructions about taking any new medicines.     · If you take aspirin or some other blood thinner, ask your doctor if and when to start taking it again. Make sure that you understand exactly what your doctor wants you to do.     · Be safe with medicines. Take pain medicines exactly as directed. ? If the doctor gave you a prescription medicine for pain, take it as prescribed.   ? If you are not taking a prescription pain medicine, ask your doctor if you can take an over-the-counter medicine.     · If you think your pain medicine is making you sick to your stomach:  ? Take your medicine after meals (unless your doctor has told you not to). ? Ask your doctor for a different pain medicine.     · If your doctor prescribed antibiotics, take them as directed. Do not stop taking them just because you feel better. You need to take the full course of antibiotics. Follow-up care is a key part of your treatment and safety. Be sure to make and go to all appointments, and call your doctor if you are having problems. It's also a good idea to know your test results and keep a list of the medicines you take. When should you call for help? Call 911 anytime you think you may need emergency care. For example, call if:    · You passed out (lost consciousness).     · You have chest pain, are short of breath, or cough up blood. Call your doctor now or seek immediate medical care if:    · You have bright red vaginal bleeding that soaks one or more pads in an hour, or you have large clots.     · You have vaginal discharge that increases in amount or smells bad.     · You are sick to your stomach or cannot drink fluids.     · You have pain that does not get better after you take pain medicine.     · You cannot pass stools or gas.     · You have symptoms of a blood clot in your leg (called a deep vein thrombosis), such as:  ? Pain in your calf, back of the knee, thigh, or groin. ? Redness and swelling in your leg.     · You have signs of infection, such as:  ? Increased pain, swelling, warmth, or redness. ? Red streaks leading from the area. ? Pus draining from the area. ? A fever. Watch closely for changes in your health, and be sure to contact your doctor if you have any problems. Where can you learn more?   Go to http://www.[employer].com/  Enter D453 in the search box to learn more about \"Dilation and Curettage: What to Expect at Home. \"  Current as of: February 11, 2020               Content Version: 12.6  © 2006-2020 [employer], [facility]. Care instructions adapted under license by [employer] (which disclaims liability or warranty for this information). If you have questions about a medical condition or this instruction, always ask your healthcare professional. [address] any warranty or liability for your use of this information. DISCHARGE SUMMARY from Nurse    PATIENT INSTRUCTIONS:    After general anesthesia or intravenous sedation, for 24 hours or while taking prescription Narcotics:  · Limit your activities  · Do not drive and operate hazardous machinery  · Do not make important personal or business decisions  · Do  not drink alcoholic beverages  · If you have not urinated within 8 hours after discharge, please contact your surgeon on call. Report the following to your surgeon:  · Excessive pain, swelling, redness or odor of or around the surgical area  · Temperature over 100.5  · Nausea and vomiting lasting longer than 4 hours or if unable to take medications  · Any signs of decreased circulation or nerve impairment to extremity: change in color, persistent  numbness, tingling, coldness or increase pain  · Any questions    What to do at Home:  A common side effect of anesthesia following surgery is nausea and/or vomiting. In order to decrease symptoms, it is wise to avoid foods that are high in fat, greasy foods, milk products, and spicy foods for the first 24 hours. Acceptable foods for the first 24 hours following surgery include but are not limited to:     soup   broth    toast    crackers    applesauce    bananas    mashed potatoes,   soft or scrambled eggs   oatmeal    jello    It is important to eat when taking your pain medication. This will help to prevent nausea. If possible, please try to time your meals with your medications.     It is very important to stay hydrated following surgery. Sip fluids frequently while awake. Avoid acidic drinks such as citrus juices and soda for 24 hours. Carbonated beverages may cause bloating and gas. Acceptable fluids include:    - water (flavor packets may add variety)  - coffee or tea (in moderation)  - Gatorade  - Param-aid  - apple juice  - cranberry juice    You are encouraged to cough and deep breathe every hour when awake. This will help to prevent respiratory complications following anesthesia. You may want to hug a pillow when coughing and sneezing to add additional support to the surgical area and to decrease discomfort if you had abdominal or chest surgery. If you are discharged home with support stockings, you may remove them after 24 hours. Support stockings are used to help prevent blood clots in the legs following surgery. Please take time to review all of your Home Care Instructions and Medication Information sheets provided in your discharge packet. If you have any questions, please contact your surgeons office. Thank you. TO PREVENT AN INFECTION      1. [address] YOUR HANDS     To prevent infection, good handwashing is the most important thing you or your caregiver can do.  Wash your hands with soap and water or use the hand  we gave you before you touch any wounds. 2. SHOWER     Use the antibacterial soap we gave you when you take a shower.  Shower with this soap until your wounds are healed.  To reach all areas of your body, you may need someone to help you.  Dont forget to clean your belly button with every shower. 3.  USE CLEAN SHEETS     Use freshly cleaned sheets on your bed after surgery.  To keep the surgery site clean, do not allow pets to sleep with you while your wound is still healing. 4. STOP SMOKING     Stop smoking, or at least cut back on smoking     Smoking slows your healing.      5.  CONTROL YOUR BLOOD SUGAR     High blood sugars slow wound healing.  If you are diabetic, control your blood sugar levels before and after your surgery. These are general instructions for a healthy lifestyle:    No smoking/ No tobacco products/ Avoid exposure to second hand smoke  Surgeon General's Warning:  Quitting smoking now greatly reduces serious risk to your health. Obesity, smoking, and sedentary lifestyle greatly increases your risk for illness    A healthy diet, regular physical exercise & weight monitoring are important for maintaining a healthy lifestyle    You may be retaining fluid if you have a history of heart failure or if you experience any of the following symptoms:  Weight gain of 3 pounds or more overnight or 5 pounds in a week, increased swelling in our hands or feet or shortness of breath while lying flat in bed. Please call your doctor as soon as you notice any of these symptoms; do not wait until your next office visit. The discharge information has been reviewed with the {PATIENT PARENT GUARDIAN:40768}. The {PATIENT PARENT GUARDIAN:00493} verbalized understanding. Discharge medications reviewed with the {Dishcarge meds reviewed UEWY:22803} and appropriate educational materials and side effects teaching were provided.   ___________________________________________________________________________________________________________________________________

## 2020-12-03 NOTE — PERIOP NOTES
1347-Handoff Report from Operating Room to PACU    Report received from HALI Cameron RN and Bull Britton CRNA regarding [employer]. Surgeon(s):  Trey Meyer MD  And Procedure(s) (LRB):  HYSTEROSCOPY, DILATION AND CURETTAGE (N/A)  confirmed   with allergies and dressings discussed. Anesthesia type, drugs, patient history, complications, estimated blood loss, vital signs, intake and output, and last pain medication, lines and temperature were reviewed. 12- Pt's mother updated and boyfriend is on the way back to the hospital to  patient. 1449-TRANSFER - OUT REPORT:    Verbal report given to Elza Staples RN(name) on [employer]  being transferred to phase II(unit) for routine post - op       Report consisted of patients Situation, Background, Assessment and   Recommendations(SBAR). Information from the following report(s) SBAR, Kardex, OR Summary, Procedure Summary, Intake/Output, MAR and Recent Results was reviewed with the receiving nurse. Opportunity for questions and clarification was provided.       Patient transported with:   Registered Nurse

## 2020-12-11 NOTE — OP NOTES
[address]  OPERATIVE REPORT    Name:  Rohan Peña  MR#:  [id number]  :  1985  ACCOUNT #:  [de-identified]  DATE OF SERVICE:  2020    PREOPERATIVE DIAGNOSES:  Abnormal uterine bleeding, thickened endometrium. POSTOPERATIVE DIAGNOSES:  Abnormal uterine bleeding, thickened endometrium. PROCEDURE PERFORMED:  D and C hysteroscopy. SURGEON:  Darren Savage MD    ASSISTANT:  none    ANESTHESIA:  IV sedation. COMPLICATIONS:  none    SPECIMENS REMOVED:  Endometrial curettings. IMPLANTS:  none. ESTIMATED BLOOD LOSS:  15 mL. FINDINGS:  With hysteroscopy, the endometrial cavity appeared slightly thickened. No polyps or fibroids. Both internal ostia were visualized. Saline was used for distending media and there was minimal deficit. DESCRIPTION OF PROCEDURE:  The patient was taken to the operating room and placed on the operating room table. After adequate anesthesia was obtained, she was placed in the dorsal lithotomy position. The perineum and vagina were prepped, and the patient was draped in the usual sterile fashion. Sterile speculum was placed in the vagina. A single tooth tenaculum was placed in the anterior lip of the cervix and a paracervical block was performed. The cervix was dilated. The diagnostic hysteroscope was inserted. Findings as above. When this was complete, the hysteroscope was removed and the smooth curette was passed, an endometrial curettage was performed. When this was complete, all instruments were removed from the vagina and there was good hemostasis from the vagina. All sponge, needle and instrument counts were correct x2 at the end of procedure. The patient tolerated the procedure well and was taken to the postanesthesia care unit in satisfactory condition.       MD SUSANNAH [email or web address]  D:  12/10/2020 13:52  T:  12/10/2020 22:18  JOB #:  [id number]

## 2021-05-25 ENCOUNTER — TRANSCRIBE ORDER (OUTPATIENT)
Dept: SCHEDULING | Age: 36
End: 2021-05-25

## 2021-05-25 DIAGNOSIS — Z12.31 VISIT FOR SCREENING MAMMOGRAM: Primary | ICD-10-CM

## 2021-07-16 ENCOUNTER — HOSPITAL ENCOUNTER (OUTPATIENT)
Dept: PREADMISSION TESTING | Age: 36
Discharge: HOME | End: 2021-07-16
Payer: MEDICAID

## 2021-07-16 ENCOUNTER — TRANSCRIBE ORDER (OUTPATIENT)
Dept: REGISTRATION | Age: 36
End: 2021-07-16

## 2021-07-16 DIAGNOSIS — Z01.812 PRE-PROCEDURE LAB EXAM: Primary | ICD-10-CM

## 2021-07-16 DIAGNOSIS — Z01.812 PRE-PROCEDURE LAB EXAM: ICD-10-CM

## 2021-07-16 PROCEDURE — U0005 INFEC AGEN DETEC AMPLI PROBE: HCPCS

## 2021-07-18 LAB
SARS-COV-2, XPLCVT: NOT DETECTED
SOURCE, COVRS: NORMAL

## 2021-07-19 ENCOUNTER — OFFICE VISIT (OUTPATIENT)
Dept: INTERNAL MEDICINE CLINIC | Age: 36
End: 2021-07-19
Payer: MEDICAID

## 2021-07-19 VITALS
DIASTOLIC BLOOD PRESSURE: 69 MMHG | SYSTOLIC BLOOD PRESSURE: 107 MMHG | OXYGEN SATURATION: 100 % | BODY MASS INDEX: 37.32 KG/M2 | HEIGHT: 65 IN | WEIGHT: 224 LBS | HEART RATE: 85 BPM | RESPIRATION RATE: 18 BRPM | TEMPERATURE: 98.2 F

## 2021-07-19 DIAGNOSIS — B35.3 TINEA PEDIS OF BOTH FEET: ICD-10-CM

## 2021-07-19 DIAGNOSIS — M67.441 GANGLION CYST OF FINGER OF RIGHT HAND: Primary | ICD-10-CM

## 2021-07-19 DIAGNOSIS — E66.9 CLASS 2 OBESITY WITHOUT SERIOUS COMORBIDITY WITH BODY MASS INDEX (BMI) OF 37.0 TO 37.9 IN ADULT, UNSPECIFIED OBESITY TYPE: ICD-10-CM

## 2021-07-19 PROCEDURE — 99214 OFFICE O/P EST MOD 30 MIN: CPT | Performed by: FAMILY MEDICINE

## 2021-07-19 RX ORDER — DOXYLAMINE SUCCINATE 25 MG
TABLET ORAL
COMMUNITY
Start: 2021-06-20

## 2021-07-19 RX ORDER — BUPROPION HYDROCHLORIDE 150 MG/1
150 TABLET, EXTENDED RELEASE ORAL 2 TIMES DAILY
COMMUNITY
Start: 2021-05-25

## 2021-07-19 RX ORDER — BUPROPION HYDROCHLORIDE 100 MG/1
100 TABLET, EXTENDED RELEASE ORAL
COMMUNITY
End: 2021-07-19

## 2021-07-19 NOTE — PATIENT INSTRUCTIONS
RECOMMEND [id number] CALORIE DIET. TRACK CALORIE INTAKE WITH MY [employer] [place],  LOSE IT [place]. FIT BIT [place] WITH FIT BIT. PROTEIN AT EVERY MEAL. REDUCE INTAKE OF STARCHY CARBS, LIKE BREAD, RICE, PASTA, AND POTATOES. MAKE CARBS 1/4 OF YOUR PLATE. DRINK CALORIE FREE BEVERAGES ONLY. TRY GRAZING DIET, 3 SMALL MEALS AND 2 SNACKS. INCREASE CARDIOVASCULAR EXERCISE, MINIMUM 3 DAYS A WEEK, 30 MINUTES OF CARDIO. Increase water intake to 6-8 glasses a day, add 30 grams of fiber to diet- fiber gummies, whole grain bread or cereal, oatmeal. Use colace stool softener 1-2 capsules at bedtime routinely if needed. If no BM for 2 days, use miralax laxative as needed. For feet: use the miconazole twice a day and socks. For hands:  aquaphor ointment as a moisturizer or glove in bottle.

## 2021-07-19 NOTE — PROGRESS NOTES
Arabella Shipley is a 38 y.o. female who presents with concern of  hard ball on right 3rd digit, tender, onset one week. Has peeling of both feet, itchy at times. Used topical antifungal, miconazole, BID, used it once a day. from [address]. Pain in both legs, andrzej groin. Worse after work. Reports distant history of lupus  Due for labs. Saw gyn, Dr Tristin Seay, given phentermine. Active. Reports not eating sweets. Not able to lose weight. Reports small BM, hard stools. Added fiber.          Past Medical History:   Diagnosis Date    Abnormal Papanicolaou smear of cervix     HPV    Anemia     Bilateral ovarian cysts     Essential hypertension     preeclampsia with first pregnancy    GERD (gastroesophageal reflux disease)     Gestational hypertension     with first pregnancy     Lupus ([address]     diagnosed at 11years of age- systemic lupus-pt doesnt see anyone for it and she said it must be mild form    Migraine     Neurological disorder     migraines     Other ill-defined conditions(889.89)     lupus    Postpartum depression     prescribed effexor     Psychiatric disorder     anxiety/depression- was taking ativan prior to pregnancy       Family History   Problem Relation Age of Onset    Breast Cancer Paternal Grandmother     Anesth Problems Neg Hx        Social History     Socioeconomic History    Marital status: SINGLE     Spouse name: Not on file    Number of children: Not on file    Years of education: Not on file    Highest education level: Not on file   Occupational History    Not on file   Tobacco Use    Smoking status: Former Smoker     Years: 1.00     Quit date: 3/31/2014     Years since quittin.3    Smokeless tobacco: Never Used   Substance and Sexual Activity    Alcohol use: No    Drug use: No    Sexual activity: Not Currently     Partners: Male   Other Topics Concern     Service Not Asked    Blood Transfusions Not Asked    Caffeine Concern Not Asked    Occupational Exposure Not Asked   [facility] Hazards Not Asked    Sleep Concern Not Asked    Stress Concern Not Asked    Weight Concern Not Asked    Special Diet Not Asked    Back Care Not Asked    Exercise Not Asked    Bike Helmet Not Asked   [address] Not Asked    Self-Exams Not Asked   Social History Narrative    Not on file     Social Determinants of Health     Financial Resource Strain:     Difficulty of Paying Living Expenses:    Food Insecurity:     Worried About Running Out of Food in the Last Year:     [address] in the Last Year:    Transportation Needs:     Lack of Transportation (Medical):  Lack of Transportation (Non-Medical):    Physical Activity:     Days of Exercise per Week:     Minutes of Exercise per Session:    Stress:     Feeling of Stress :    Social Connections:     Frequency of Communication with Friends and Family:     Frequency of Social Gatherings with Friends and Family:     Attends [employer] Services:     Active Member of Clubs or Organizations:     Attends Club or Organization Meetings:     Marital Status:    Intimate Partner Violence:     Fear of Current or Ex-Partner:     Emotionally Abused:     Physically Abused:     Sexually Abused:        Current Outpatient Medications on File Prior to Visit   Medication Sig Dispense Refill    LORazepam (ATIVAN) 1 mg tablet Take 0.5-1 Tabs by mouth two (2) times daily as needed for Anxiety. Max Daily Amount: 2 mg. 30 Tab 0    benzocaine-resorcinol (VAGISIL) 5-2 % topical cream Apply  to affected area two (2) times daily as needed for Itching. 1 Tube 0    ibuprofen (MOTRIN IB) 200 mg tablet Take 4 Tabs by mouth every eight (8) hours as needed for Pain. 30 Tab 0    phentermine (ADIPEX-P) 37.5 mg tablet Take 37.5 mg by mouth every morning.  vitamin E (AQUA GEMS) 400 unit capsule Take 400 Units by mouth daily.  cetirizine (ZYRTEC) 10 mg tablet Take 1 Tab by mouth daily.  20 Tab 0    SUMAtriptan (IMITREX) 25 mg tablet Take 25 mg by mouth once as needed for Migraine.  buPROPion SR (WELLBUTRIN SR) 150 mg SR tablet Take 150 mg by mouth two (2) times a day.  miconazole (MICOTIN) 2 % topical cream APPLY TO AFFECTED AREA 2 TIMES A DAY FOR 30 DAYS      [DISCONTINUED] buPROPion SR (Wellbutrin SR) 100 mg SR tablet Take 100 mg by mouth.  ferrous sulfate (SLOW FE PO) Take 1 Tab by mouth as needed. (Patient not taking: Reported on 7/19/2021)      albuterol sulfate 90 mcg/actuation aepb Take 2 Puffs by inhalation four (4) times daily as needed for Cough (SOB, wheezing). (Patient not taking: Reported on 7/19/2021) 1 Inhaler 0     No current facility-administered medications on file prior to visit. Review of Systems  Pertinent items are noted in HPI. Objective:     Visit Vitals  /69 (BP 1 Location: Left arm, BP Patient Position: Sitting, BP Cuff Size: Large adult)   Pulse 85   Temp 98.2 °F (36.8 °C) (Oral)   Resp 18   Ht 5' 5\" (1.651 m)   Wt 224 lb (101.6 kg)   SpO2 100%   BMI 37.28 kg/m²     Gen: well appearing female  Resp:  No wheezing, no rhonchi, no rales. CV:  RRR, normal S1S2, no murmur. GI: soft, nontender, without masses. No hepatosplenomegaly. Extrem:  +2 pulses, no edema, warm distally, right 3rd digit ganglion cyst  Skin: xerosis and excoriation of both feet plantar surface, worse on left side. Assessment/Plan:       ICD-10-CM ICD-9-CM    1. Ganglion cyst of finger of right hand  M67.441 727.43    2. Tinea pedis of both feet  B35.3 110.4    3. Class 2 obesity without serious comorbidity with body mass index (BMI) of 37.0 to 37.9 in adult, unspecified obesity type  E66.9 278.00     Z68.37 V85.37    RECOMMEND [id number] CALORIE DIET. TRACK CALORIE INTAKE WITH MY [employer] [place],  LOSE IT [place]. FIT BIT [place] WITH FIT BIT. PROTEIN AT EVERY MEAL. REDUCE INTAKE OF STARCHY CARBS, LIKE BREAD, RICE, PASTA, AND POTATOES. MAKE CARBS 1/4 OF YOUR PLATE.   DRINK CALORIE FREE BEVERAGES ONLY.  TRY GRAZING DIET, 3 SMALL MEALS AND 2 SNACKS. INCREASE CARDIOVASCULAR EXERCISE, MINIMUM 3 DAYS A WEEK, 30 MINUTES OF CARDIO. Increase water intake to 6-8 glasses a day, add 30 grams of fiber to diet- fiber gummies, whole grain bread or cereal, oatmeal. Use colace stool softener 1-2 capsules at bedtime routinely if needed. If no BM for 2 days, use miralax laxative as needed. For feet: use the miconazole twice a day and socks. For hands:  aquaphor ointment as a moisturizer or glove in bottle. Follow-up and Dispositions    · Return for follow up pending labs.   Verna Garber MD

## 2021-09-20 ENCOUNTER — HOSPITAL ENCOUNTER (OUTPATIENT)
Dept: MAMMOGRAPHY | Age: 36
Discharge: HOME | End: 2021-09-20
Attending: SPECIALIST
Payer: MEDICAID

## 2021-09-20 DIAGNOSIS — Z12.31 VISIT FOR SCREENING MAMMOGRAM: ICD-10-CM

## 2021-09-20 PROCEDURE — 77067 SCR MAMMO BI INCL CAD: CPT

## 2022-03-19 PROBLEM — E66.01 SEVERE OBESITY (HCC): Status: ACTIVE | Noted: 2020-09-21

## 2022-03-19 PROBLEM — G43.909 MIGRAINE: Status: ACTIVE | Noted: 2018-02-04

## 2022-03-19 PROBLEM — R11.2 NAUSEA & VOMITING: Status: ACTIVE | Noted: 2018-02-04

## 2022-03-20 PROBLEM — Z34.90 ENCOUNTER FOR ELECTIVE INDUCTION OF LABOR: Status: ACTIVE | Noted: 2019-02-07

## 2022-03-20 PROBLEM — K52.9 GASTROENTERITIS: Status: ACTIVE | Noted: 2018-02-04

## 2022-04-28 ENCOUNTER — HOSPITAL ENCOUNTER (EMERGENCY)
Age: 37
Discharge: HOME | End: 2022-04-28
Attending: EMERGENCY MEDICINE | Admitting: EMERGENCY MEDICINE
Payer: MEDICAID

## 2022-04-28 VITALS
TEMPERATURE: 97.6 F | BODY MASS INDEX: 36.65 KG/M2 | RESPIRATION RATE: 16 BRPM | HEIGHT: 65 IN | OXYGEN SATURATION: 99 % | DIASTOLIC BLOOD PRESSURE: 91 MMHG | SYSTOLIC BLOOD PRESSURE: 142 MMHG | WEIGHT: 220 LBS | HEART RATE: 71 BPM

## 2022-04-28 DIAGNOSIS — G44.201 ACUTE INTRACTABLE TENSION-TYPE HEADACHE: ICD-10-CM

## 2022-04-28 DIAGNOSIS — G43.101 MIGRAINE WITH AURA AND WITH STATUS MIGRAINOSUS, NOT INTRACTABLE: Primary | ICD-10-CM

## 2022-04-28 DIAGNOSIS — R11.10 ACUTE VOMITING: ICD-10-CM

## 2022-04-28 LAB
COMMENT, HOLDF: NORMAL
SAMPLES BEING HELD,HOLD: NORMAL

## 2022-04-28 PROCEDURE — 99284 EMERGENCY DEPT VISIT MOD MDM: CPT

## 2022-04-28 PROCEDURE — 96375 TX/PRO/DX INJ NEW DRUG ADDON: CPT

## 2022-04-28 PROCEDURE — 74011250636 HC RX REV CODE- 250/636: Performed by: EMERGENCY MEDICINE

## 2022-04-28 PROCEDURE — 96365 THER/PROPH/DIAG IV INF INIT: CPT

## 2022-04-28 PROCEDURE — 74011000258 HC RX REV CODE- 258: Performed by: EMERGENCY MEDICINE

## 2022-04-28 RX ORDER — KETOROLAC TROMETHAMINE 30 MG/ML
30 INJECTION, SOLUTION INTRAMUSCULAR; INTRAVENOUS
Status: COMPLETED | OUTPATIENT
Start: 2022-04-28 | End: 2022-04-28

## 2022-04-28 RX ORDER — DIPHENHYDRAMINE HYDROCHLORIDE 50 MG/ML
50 INJECTION, SOLUTION INTRAMUSCULAR; INTRAVENOUS
Status: COMPLETED | OUTPATIENT
Start: 2022-04-28 | End: 2022-04-28

## 2022-04-28 RX ORDER — SUMATRIPTAN 25 MG/1
25 TABLET, FILM COATED ORAL
Qty: 15 TABLET | Refills: 0 | Status: SHIPPED | OUTPATIENT
Start: 2022-04-28 | End: 2022-04-28

## 2022-04-28 RX ORDER — MAGNESIUM SULFATE 1 G/100ML
1 INJECTION INTRAVENOUS ONCE
Status: COMPLETED | OUTPATIENT
Start: 2022-04-28 | End: 2022-04-28

## 2022-04-28 RX ADMIN — METHYLPREDNISOLONE SODIUM SUCCINATE 125 MG: 125 INJECTION, POWDER, FOR SOLUTION INTRAMUSCULAR; INTRAVENOUS at 19:48

## 2022-04-28 RX ADMIN — PROMETHAZINE HYDROCHLORIDE 25 MG: 25 INJECTION INTRAMUSCULAR; INTRAVENOUS at 20:14

## 2022-04-28 RX ADMIN — KETOROLAC TROMETHAMINE 30 MG: 30 INJECTION, SOLUTION INTRAMUSCULAR; INTRAVENOUS at 19:48

## 2022-04-28 RX ADMIN — DIPHENHYDRAMINE HYDROCHLORIDE 50 MG: 50 INJECTION, SOLUTION INTRAMUSCULAR; INTRAVENOUS at 19:48

## 2022-04-28 RX ADMIN — SODIUM CHLORIDE 1000 ML: 9 INJECTION, SOLUTION INTRAVENOUS at 19:48

## 2022-04-28 RX ADMIN — MAGNESIUM SULFATE IN DEXTROSE 1 G: 10 INJECTION, SOLUTION INTRAVENOUS at 20:34

## 2022-04-28 NOTE — ED NOTES
Pt is allergic to both zofran and compazine. Only 25 mg Phenergan is effective and relieving her vomiting.

## 2022-04-29 NOTE — DISCHARGE INSTRUCTIONS
Thank You! It was a pleasure taking care of you in our Emergency Department today. We know that when you come to [facility], you are entrusting us with your health, comfort, and safety. Our physicians and nurses honor that trust, and truly appreciate the opportunity to care for you and your loved ones. We also value your feedback. If you receive a survey about your Emergency Department experience today, please fill it out. We care about our patients' feedback, and we listen to what you have to say. Thank you. Dr. Liu Hager M.D.      ________________________________________________________________________  I have included a copy of your lab results and/or radiologic studies from today's visit so you can have them easily available at your follow-up visit. We hope you feel better and please do not hesitate to contact the ED if you have any questions at all! Recent Results (from the past 12 hour(s))   SAMPLES BEING HELD    Collection Time: 04/28/22  5:53 PM   Result Value Ref Range    SAMPLES BEING HELD LAV,2PST,1GOLD,RED     COMMENT        Add-on orders for these samples will be processed based on acceptable specimen integrity and analyte stability, which may vary by analyte. No orders to display     CT Results  (Last 48 hours)      None          The exam and treatment you received in the Emergency Department were for an urgent problem and are not intended as complete care. It is important that you follow up with a doctor, nurse practitioner, or physician assistant for ongoing care. If your symptoms become worse or you do not improve as expected and you are unable to reach your usual health care provider, you should return to the Emergency Department. We are available 24 hours a day. Please take your discharge instructions with you when you go to your follow-up appointment.      If a prescription has been provided, please have it filled as soon as possible to prevent a delay in treatment. Read the entire medication instruction sheet provided to you by the pharmacy. If you have any questions or reservations about taking the medication due to side effects or interactions with other medications, please call your primary care physician or contact the ER to speak with the charge nurse. Please make an appointment with your family doctor or the physician you were referred to for follow-up of this visit as instructed on your discharge paperwork. Return to the ER if you are unable to be seen or if you are unable to be seen in a timely manner. If you have any problem arranging the follow-up visit, contact the Emergency Department immediately.

## 2022-04-29 NOTE — ED PROVIDER NOTES
EMERGENCY DEPARTMENT HISTORY AND PHYSICAL EXAM      Please note that this dictation was completed with the assistance of \"Dragon\", the computer voice recognition software. Quite often unanticipated grammatical, syntax, homophones, and other interpretive errors are inadvertently transcribed by the computer software. Please disregard these errors and any errors that have escaped final proofreading. Thank you. Patient: Le Martinez  DOS: 22  : 1985  MRN: [id number]  History of Presenting Illness     Chief Complaint   Patient presents with    Headache     hx of migraines. complains of frontal headache since waking up this morning, not responding to 4 ibuprofen at both 0600 and 1230 today as well as Phentermine this morning    Vomiting     x 15-16 episodes today     History Provided By: Patient/family/EMS (if applicable)    HPI: Le Martinez, 43 y.o. female with past medical history as documented below presents to the ED with c/o of acute onset of headache and vomiting onset this morning. Pt states she's on her period and that usually precipitates her migraine. She was last seen in the ED a couple years ago for the same and received a headache cocktail that relieved the pain. Pt notes headache is moderate in intensity. This isn't her worst HA ever. Pt took 4 Motrin at 6AM and again at 12:30PM without relief. Pt also took Phentermine. She's had at least 15 episodes of NBNB emesis. Pt denies any other exacerbating or ameliorating factors. Additionally, pt specifically denies any recent fever, chills, abdominal pain, CP, SOB, lightheadedness, dizziness, numbness, weakness, lower extremity swelling, heart palpitations, urinary sxs, diarrhea, constipation, melena, hematochezia, cough, or congestion. There are no other complaints, changes or physical findings pertinent to the HPI at this time.     PCP: Carolina Rizo MD  Past History   Past Medical History:  Past Medical History:   Diagnosis Date    Abnormal Papanicolaou smear of cervix     HPV    Anemia     Bilateral ovarian cysts     Essential hypertension     preeclampsia with first pregnancy    GERD (gastroesophageal reflux disease)     Gestational hypertension     with first pregnancy     Lupus ([address]     diagnosed at 11years of age- systemic lupus-pt doesnt see anyone for it and she said it must be mild form    Migraine     Neurological disorder     migraines     Other ill-defined conditions(799.89)     lupus    Postpartum depression     prescribed effexor     Psychiatric disorder     anxiety/depression- was taking ativan prior to pregnancy       Past Surgical History:  Past Surgical History:   Procedure Laterality Date    COLONOSCOPY N/A 3/14/2017    COLON[facility]COPY performed [facility]co [facility] at [facility]  3/14/2017         HX GI      LAPAROSCOPY    HX HEENT  14    sinus surgery    HX HERNIA REPAIR      BILATERAL INGUINAL HERNIA REPAIR- 10weeks old    HX OTHER SURGICAL      hemmorhoid, hernia-groin as an infant     HX OTHER SURGICAL      sinus surgery        Family History:   Family history reviewed and was non-contributory, unless specified below:  Family History   Problem Relation Age of Onset    Breast Cancer Paternal Grandmother     Anesth Problems Neg Hx        Social History:  Social History     Tobacco Use    Smoking status: Former Smoker     Years: 1.00     Quit date: 3/31/2014     Years since quittin.0    Smokeless tobacco: Never Used   Substance Use Topics    Alcohol use: No    Drug use: No       Allergies: Allergies   Allergen Reactions    Morphine Other (comments)     Pt states it makes her \"very angry\"    Compazine [Prochlorperazine Edisylate] Other (comments)     Patient felt very anxious and apprehensive after IV dose.      Percocet [Oxycodone-Acetaminophen] Nausea and Vomiting    Reglan [Metoclopramide] Other (comments)     anxious    Zofran [Ondansetron Hcl (Pf)] Other (comments)     Per patient \"makes me edgy\"       Current Medications:  No current facility-administered medications on file prior to encounter. Current Outpatient Medications on File Prior to Encounter   Medication Sig Dispense Refill    buPROPion SR (WELLBUTRIN SR) 150 mg SR tablet Take 150 mg by mouth two (2) times a day.  miconazole (MICOTIN) 2 % topical cream APPLY TO AFFECTED AREA 2 TIMES A DAY FOR 30 DAYS      LORazepam (ATIVAN) 1 mg tablet Take 0.5-1 Tabs by mouth two (2) times daily as needed for Anxiety. Max Daily Amount: 2 mg. 30 Tab 0    benzocaine-resorcinol (VAGISIL) 5-2 % topical cream Apply  to affected area two (2) times daily as needed for Itching. 1 Tube 0    ibuprofen (MOTRIN IB) 200 mg tablet Take 4 Tabs by mouth every eight (8) hours as needed for Pain. 30 Tab 0    ferrous sulfate (SLOW FE PO) Take 1 Tab by mouth as needed. (Patient not taking: Reported on 7/19/2021)      phentermine (ADIPEX-P) 37.5 mg tablet Take 37.5 mg by mouth every morning.  vitamin E (AQUA GEMS) 400 unit capsule Take 400 Units by mouth daily.  cetirizine (ZYRTEC) 10 mg tablet Take 1 Tab by mouth daily. 20 Tab 0    albuterol sulfate 90 mcg/actuation aepb Take 2 Puffs by inhalation four (4) times daily as needed for Cough (SOB, wheezing). (Patient not taking: Reported on 7/19/2021) 1 Inhaler 0    [DISCONTINUED] SUMAtriptan (IMITREX) 25 mg tablet Take 25 mg by mouth once as needed for Migraine. Review of Systems   A complete ROS was reviewed by me today and all other systems negative, unless otherwise specified below:  Review of Systems   Constitutional: Negative. Negative for chills and fever. HENT: Negative. Negative for congestion and sore throat. Eyes: Negative. Respiratory: Negative. Negative for cough, chest tightness, shortness of breath and wheezing. Cardiovascular: Negative. Negative for chest pain, palpitations and leg swelling.    Gastrointestinal: Positive for nausea and vomiting. Negative for abdominal distention, abdominal pain, blood in stool, constipation and diarrhea. Endocrine: Negative. Genitourinary: Negative. Negative for dysuria, flank pain, frequency, hematuria and urgency. Musculoskeletal: Negative. Negative for arthralgias, back pain and myalgias. Skin: Negative. Negative for color change and rash. Neurological: Positive for headaches. Negative for dizziness, syncope, speech difficulty, weakness, light-headedness and numbness. Hematological: Negative. Psychiatric/Behavioral: Negative. Negative for confusion and self-injury. The patient is not nervous/anxious. All other systems reviewed and are negative. Physical Exam   Physical Exam  Vitals and nursing note reviewed. Constitutional:       General: She is not in acute distress. Appearance: She is well-developed. She is not diaphoretic. HENT:      Head: Normocephalic and atraumatic. Mouth/Throat:      Pharynx: No oropharyngeal exudate. Eyes:      Conjunctiva/sclera: Conjunctivae normal.   Cardiovascular:      Rate and Rhythm: Normal rate and regular rhythm. Heart sounds: Normal heart sounds. Pulmonary:      Effort: Pulmonary effort is normal. No respiratory distress. Breath sounds: Normal breath sounds. No wheezing or rales. Chest:      Chest wall: No tenderness. Abdominal:      General: Bowel sounds are normal. There is no distension. Palpations: Abdomen is soft. There is no mass. Tenderness: There is no abdominal tenderness. There is no guarding or rebound. Musculoskeletal:         General: Normal range of motion. Cervical back: Normal range of motion. Skin:     General: Skin is warm. Neurological:      Mental Status: She is alert and oriented to person, place, and time. Cranial Nerves: No cranial nerve deficit. Motor: No abnormal muscle tone. Comments: CN II - XII grossly intact with no focal deficits.  Strength 5/5 and normal in biceps, triceps and hand  bilaterally. Strength 5/5 in plantar and dorsiflexion bilaterally. Normal sensation in arms and legs bilaterally to light touch. Negative pronator drift. Finger to nose intact. Heel to shin intact bilaterally. No visual field deficits. No nystagmus. Negative Romberg. Gait is normal as observed in room. Diagnostic Study Results     Laboratory Data  I have personally reviewed and interpreted all available laboratory results. Recent Results (from the past 24 hour(s))   SAMPLES BEING HELD    Collection Time: 04/28/22  5:53 PM   Result Value Ref Range    SAMPLES BEING HELD LAV,2PST,1GOLD,RED     COMMENT        Add-on orders for these samples will be processed based on acceptable specimen integrity and analyte stability, which may vary by analyte. Radiologic Studies   I have personally reviewed and interpreted all available imaging studies and agree with radiology interpretation. No orders to display     CT Results  (Last 48 hours)    None        CXR Results  (Last 48 hours)    None        Medical Decision Making   I am the first and primary ED physician for this patient's ED visit today. I reviewed our electronic medical record system for any past medical records that may contribute to the patient's current condition, including their past medical history, surgical history, social and family history. This also includes their most recent ED visits, previous hospitalizations and prior diagnostic data. I have reviewed and summarized the most pertinent findings in my HPI and MDM.     Vital Signs Reviewed:  Patient Vitals for the past 24 hrs:   Temp Pulse Resp BP SpO2   04/28/22 1705 97.6 °F (36.4 °C) 71 16 (!) 142/91 99 %     Pulse Oximetry Analysis: 99% on RA    Cardiac Monitor:   Rate: 71 bpm  The cardiac monitor revealed the following rhythm as interpreted by me: Normal Sinus Rhythm  Cardiac monitoring was ordered to monitor patient for signs of cardiac dysrhythmia, which they are at risk for based on their history and/or risk for cardiovascular disease and/or metabolic abnormalities. Records Reviewed: Nursing Notes, Old Medical Records, Previous electrocardiograms, Previous Radiology Studies and Previous Laboratory Studies, EMS reports    Provider Notes (Medical Decision Making):   Pt presents with acute headache; afebrile with stable vitals; exam is without focal deficits. DDx: migraine, tension headache, cluster HA, stress, hypertensive urgency, dehydration. HA was gradual onset, pt neuro intact, no nausea/vomiting/focal weakness/sensory change to suggest CVA or ICH. Also pt is not immunocompromised, no fever, no focal weakness to suggest brain abscess. Therefore, no CT head. No neck stiffness, fever, AMS, photophobia to suggest meningitis. Neg kernig and Brudzinski. Therefore no LP. No jaw claudication, visual changes or temporal pain to suggest temporal arteritis, therefore no ESR/CRP. No eye pain, PERRL, no red eye to suggest glaucoma. No risk factors for CO. Will treat pain and reassess. ED Course:   Initial assessment performed. I discussed presenting problems and concerns, and my formulated plan for today's visit with the patient and any available family members. I have encouraged them to ask questions as they arise throughout the visit.    Social History     Tobacco Use    Smoking status: Former Smoker     Years: 1.00     Quit date: 3/31/2014     Years since quittin.0    Smokeless tobacco: Never Used   Substance Use Topics    Alcohol use: No    Drug use: No       ED Orders Placed:  Orders Placed This Encounter    Hold Sample    URINALYSIS W/ REFLEX CULTURE    POC URINE PREGNANCY TEST    INSERT PERIPHERAL IV ONE TIME STAT    sodium chloride 0.9 % bolus infusion 1,000 mL    promethazine (PHENERGAN) 25 mg in 0.9% sodium chloride 50 mL IVPB    ketorolac (TORADOL) injection 30 mg    diphenhydrAMINE (BENADRYL) injection 50 mg    methylPREDNISolone (PF) (Solu-MEDROL) injection 125 mg    magnesium sulfate 1 g/100 ml IVPB (premix or compounded)    SUMAtriptan (IMITREX) 25 mg tablet       ED Medications Administered During ED Course:  Medications   sodium chloride 0.9 % bolus infusion 1,000 mL (0 mL IntraVENous IV Completed 4/28/22 2127)   promethazine (PHENERGAN) 25 mg in 0.9% sodium chloride 50 mL IVPB (0 mg IntraVENous IV Completed 4/28/22 2029)   ketorolac (TORADOL) injection 30 mg (30 mg IntraVENous Given 4/28/22 1948)   diphenhydrAMINE (BENADRYL) injection 50 mg (50 mg IntraVENous Given 4/28/22 1948)   methylPREDNISolone (PF) (Solu-MEDROL) injection 125 mg (125 mg IntraVENous Given 4/28/22 1948)   magnesium sulfate 1 g/100 ml IVPB (premix or compounded) (0 g IntraVENous IV Completed 4/28/22 2127)        Progress Note:  I have just re-evaluated the patient. Patient reports improvement of sx's. I have reviewed Her vital signs and determined there is currently no worsening in their condition or physical exam. Results have been reviewed with them and their questions have been answered. We will continue to review further results as they come available. Progress Note:  Pt reassessed and symptoms noted to have improved significantly after ED treatment. Pt is clinically stable for discharge. Eli Driscoll's labs and imaging have been reviewed with her and available family. She verbally conveys understanding and agreement of the signs, symptoms, diagnosis, treatment and prognosis and additionally agrees to follow up as recommended with Dr. Zuhair Vivas MD and/or specialist as instructed. She agrees with the care plan we have created and conveys that all of her questions have been answered.  Additionally, I have put together a packet of discharge instructions for her that include: 1) educational information regarding their diagnosis, 2) how to care for their diagnosis at home, as well a 3) list of reasons why they would want to return to the ED prior to their follow-up appointment should the patient's condition change or symptoms worsen. I have answered all questions to the patient's satisfaction. Strict return precautions given. She conveyed understanding and agreement with care plan. Vital signs stable for discharge. Disposition:  DISCHARGE  The pt is ready for discharge. The pt's signs, symptoms, diagnosis, and discharge instructions have been discussed and pt has conveyed their understanding. The pt is to follow up as recommended or return to ER should their symptoms worsen. Plan has been discussed and pt is in agreement. Plan:  1. Return precautions as discussed with patient and available family/caregiver. 2.   Discharge Medication List as of 4/28/2022  9:11 PM      CONTINUE these medications which have CHANGED    Details   SUMAtriptan (IMITREX) 25 mg tablet Take 1 Tablet by mouth once as needed for Migraine for up to 1 dose., Normal, Disp-15 Tablet, R-0         CONTINUE these medications which have NOT CHANGED    Details   buPROPion SR (WELLBUTRIN SR) 150 mg SR tablet Take 150 mg by mouth two (2) times a day., Historical Med      miconazole (MICOTIN) 2 % topical cream APPLY TO AFFECTED AREA 2 TIMES A DAY FOR 30 DAYS, Historical Med      LORazepam (ATIVAN) 1 mg tablet Take 0.5-1 Tabs by mouth two (2) times daily as needed for Anxiety. Max Daily Amount: 2 mg., Normal, Disp-30 Tab,R-0      benzocaine-resorcinol (VAGISIL) 5-2 % topical cream Apply  to affected area two (2) times daily as needed for Itching., Print, Disp-1 Tube, R-0      ibuprofen (MOTRIN IB) 200 mg tablet Take 4 Tabs by mouth every eight (8) hours as needed for Pain., Print, Disp-30 Tab, R-0      ferrous sulfate (SLOW FE PO) Take 1 Tab by mouth as needed., Historical Med      phentermine (ADIPEX-P) 37.5 mg tablet Take 37.5 mg by mouth every morning., Historical Med      vitamin E (AQUA GEMS) 400 unit capsule Take 400 Units by mouth daily. , Historical Med      cetirizine (ZYRTEC) 10 mg tablet Take 1 Tab by mouth daily. , Print, Disp-20 Tab, R-0      albuterol sulfate 90 mcg/actuation aepb Take 2 Puffs by inhalation four (4) times daily as needed for Cough (SOB, wheezing). , Print, Disp-1 Inhaler, R-0           3. Follow-up Information     Follow up With Specialties Details Why Contact Info    [facility] EMERGENCY DEPT Emergency Medicine  As needed, If symptoms worsen [address] [facility] [address] [facility] [address]    Jessica Dupont MD Internal Medicine  As needed, If symptoms worsen [address] [facility]  [address]  [address] [facility] [address] [facility] [address]   [phone]        Instructed to return to ED if worse  Diagnosis   Clinical Impression:  1. Migraine with aura and with status migrainosus, not intractable    2. Acute intractable tension-type headache    3. Acute vomiting      Attestation:  Suaznne Otto MD, am the attending of record for this patient. I personally performed the services described in this documentation on this date, 4/28/2022 for patient, [employer]. I have reviewed the chart and verified that the record is accurate and complete.

## 2022-07-12 ENCOUNTER — HOSPITAL ENCOUNTER (EMERGENCY)
Age: 37
Discharge: HOME | End: 2022-07-12
Attending: EMERGENCY MEDICINE
Payer: MEDICAID

## 2022-07-12 VITALS
RESPIRATION RATE: 16 BRPM | OXYGEN SATURATION: 98 % | TEMPERATURE: 98.7 F | HEART RATE: 70 BPM | SYSTOLIC BLOOD PRESSURE: 122 MMHG | DIASTOLIC BLOOD PRESSURE: 80 MMHG

## 2022-07-12 DIAGNOSIS — J01.90 ACUTE SINUSITIS, RECURRENCE NOT SPECIFIED, UNSPECIFIED LOCATION: Primary | ICD-10-CM

## 2022-07-12 LAB — DEPRECATED S PYO AG THROAT QL EIA: NEGATIVE

## 2022-07-12 PROCEDURE — 87070 CULTURE OTHR SPECIMN AEROBIC: CPT

## 2022-07-12 PROCEDURE — 99283 EMERGENCY DEPT VISIT LOW MDM: CPT

## 2022-07-12 PROCEDURE — 87880 STREP A ASSAY W/OPTIC: CPT

## 2022-07-12 RX ORDER — AZITHROMYCIN DIHYDRATE 250 MG/1
TABLET, FILM COATED ORAL
Qty: 6 TABLET | Refills: 0 | Status: SHIPPED | OUTPATIENT
Start: 2022-07-12 | End: 2022-07-17

## 2022-07-12 NOTE — ED PROVIDER NOTES
42 yo F with hx of migraines, lupus, GERD, anemia, and ovarian cyst here for evaluation of one month of sore throat, sneezing, congestion, nasal drainage. Recently with facial pain. Pt has not taken an at home Covid test during illness. Pt has been intermittently taking Motrin/Tylenol. Taken Zyrtec daily. Denies fever, CP, SOB, abd pain, urinary symptoms. The history is provided by the patient. Sore Throat   This is a recurrent problem. The current episode started more than 1 week ago. There has been no fever. Pertinent negatives include no diarrhea, no vomiting and no shortness of breath. Nasal Pain   Pertinent negatives include no vomiting.         Past Medical History:   Diagnosis Date    Abnormal Papanicolaou smear of cervix     HPV    Anemia     Bilateral ovarian cysts     Essential hypertension     preeclampsia with first pregnancy    GERD (gastroesophageal reflux disease)     Gestational hypertension     with first pregnancy     Lupus ([facility] [address]     diagnosed at 11years of age- systemic lupus-pt doesnt see anyone for it and she said it must be mild form    Migraine     Neurological disorder     migraines     Other ill-defined conditions(799.89)     lupus    Postpartum depression     prescribed effexor     Psychiatric disorder     anxiety/depression- was taking ativan prior to pregnancy       Past Surgical History:   Procedure Laterality Date    COLONOSCOPY N/A 3/14/2017    COLONOSCOPY performed by Fany Rea MD at [facility]  3/14/2017         HX GI      LAPAROSCOPY    HX HEENT  4/4/16    sinus surgery    HX HERNIA REPAIR      BILATERAL INGUINAL HERNIA REPAIR- 10weeks old    HX OTHER SURGICAL      hemmorhoid, hernia-groin as an infant     HX OTHER SURGICAL      sinus surgery          Family History:   Problem Relation Age of Onset    Breast Cancer Paternal Grandmother     Anesth Problems Neg Hx        Social History     Socioeconomic History    Marital status: SINGLE     Spouse name: Not on file    Number of children: Not on file    Years of education: Not on file    Highest education level: Not on file   Occupational History    Not on file   Tobacco Use    Smoking status: Former Smoker     Years: 1.00     Quit date: 3/31/2014     Years since quittin.2    Smokeless tobacco: Never Used   Substance and Sexual Activity    Alcohol use: No    Drug use: No    Sexual activity: Not Currently     Partners: Male   Other Topics Concern     Service Not Asked    Blood Transfusions Not Asked    Caffeine Concern Not Asked    Occupational Exposure Not Asked    Hobby Hazards Not Asked    Sleep Concern Not Asked    Stress Concern Not Asked    Weight Concern Not Asked    Special Diet Not Asked    Back Care Not Asked    Exercise Not Asked    Bike Helmet Not Asked    [address] Not Asked    Self-Exams Not Asked   Social History Narrative    Not on file     Social Determinants of Health     Financial Resource Strain:     Difficulty of Paying Living Expenses: Not on file   Food Insecurity:     Worried About Running Out of Food in the Last Year: Not on file    Alirio of Food in the Last Year: Not on file   Transportation Needs:     Lack of Transportation (Medical): Not on file    Lack of Transportation (Non-Medical):  Not on file   Physical Activity:     Days of Exercise per Week: Not on file    Minutes of Exercise per Session: Not on file   Stress:     Feeling of Stress : Not on file   Social Connections:     Frequency of Communication with Friends and Family: Not on file    Frequency of Social Gatherings with Friends and Family: Not on file    Attends [employer]ology Services: Not on file    Active Member of Clubs or Organizations: Not on file    Attends Club or Organization Meetings: Not on file    Marital Status: Not on file   Intimate Partner Violence:     Fear of Current or Ex-Partner: Not on file    Emotionally Abused: Not on file    Physically Abused: Not on file    Sexually Abused: Not on file   Housing Stability:     Unable to Pay for Housing in the Last Year: Not on file    Number of Places Lived in the Last Year: Not on file    Unstable Housing in the Last Year: Not on file         ALLERGIES: Morphine, Compazine [prochlorperazine edisylate], Percocet [oxycodone-acetaminophen], Reglan [metoclopramide], and Zofran [ondansetron hcl (pf)]    Review of Systems   Constitutional: Negative for activity change, chills and fever. HENT: Positive for rhinorrhea, sinus pressure, sinus pain and sore throat. Negative for facial swelling. Eyes: Negative for discharge. Respiratory: Negative for chest tightness and shortness of breath. Cardiovascular: Negative for leg swelling. Gastrointestinal: Negative for abdominal distention, diarrhea and vomiting. Genitourinary: Negative for dysuria. Musculoskeletal: Positive for back pain. Negative for neck stiffness. Skin: Negative for color change. Neurological: Negative for seizures and syncope. Psychiatric/Behavioral: Negative for behavioral problems. Vitals:    07/12/22 1349   BP: (!) 145/62   Pulse: 87   Resp: 16   Temp: 98.7 °F (37.1 °C)   SpO2: 97%            Physical Exam  Vitals and nursing note reviewed. Constitutional:       General: She is not in acute distress. Appearance: She is well-developed. HENT:      Head: Normocephalic and atraumatic. Comments: +frontal and maxillary sinus tenderness     Right Ear: External ear normal.      Left Ear: External ear normal.      Nose: Congestion present. Mouth/Throat:      Pharynx: No oropharyngeal exudate or posterior oropharyngeal erythema. Tonsils: No tonsillar exudate or tonsillar abscesses. Eyes:      General:         Right eye: No discharge. Left eye: No discharge. Conjunctiva/sclera: Conjunctivae normal.      Pupils: Pupils are equal, round, and reactive to light.    Cardiovascular: Rate and Rhythm: Normal rate and regular rhythm. Heart sounds: Normal heart sounds. Pulmonary:      Effort: Pulmonary effort is normal.      Breath sounds: Normal breath sounds. Abdominal:      General: Bowel sounds are normal. There is no distension. Palpations: Abdomen is soft. Tenderness: There is no abdominal tenderness. There is no guarding or rebound. Musculoskeletal:         General: No tenderness. Normal range of motion. Cervical back: Normal range of motion and neck supple. No rigidity. Lymphadenopathy:      Cervical: No cervical adenopathy. Skin:     General: Skin is warm and dry. Findings: No rash. Neurological:      Mental Status: She is alert and oriented to person, place, and time. Cranial Nerves: No cranial nerve deficit. Coordination: Coordination normal.   Psychiatric:         Behavior: Behavior normal.         Thought Content: Thought content normal.         Judgment: Judgment normal.          MDM  Number of Diagnoses or Management Options     Amount and/or Complexity of Data Reviewed  Clinical lab tests: ordered and reviewed           Procedures    Patient has been reassessed. Will treat for sinus. Ready to discharge home. Patient's results have been reviewed with them. Patient and/or family have verbally conveyed their understanding and agreement of the patient's signs, symptoms, diagnosis, treatment and prognosis and additionally agree to follow up as recommended or return to the Emergency Room should their condition change prior to follow-up. Discharge instructions have also been provided to the patient with some educational information regarding their diagnosis as well a list of reasons why they would want to return to the ER prior to their follow-up appointment should their condition change.   TAVIA Mcintosh

## 2022-07-14 LAB
BACTERIA SPEC CULT: NORMAL
SERVICE CMNT-IMP: NORMAL

## 2022-09-19 ENCOUNTER — TRANSCRIBE ORDER (OUTPATIENT)
Dept: SCHEDULING | Age: 37
End: 2022-09-19

## 2022-09-19 DIAGNOSIS — Z12.31 ENCOUNTER FOR SCREENING MAMMOGRAM FOR MALIGNANT NEOPLASM OF BREAST: Primary | ICD-10-CM

## 2023-02-01 ENCOUNTER — TRANSCRIBE ORDER (OUTPATIENT)
Dept: SCHEDULING | Age: 38
End: 2023-02-01

## 2023-02-01 DIAGNOSIS — Z12.31 ENCOUNTER FOR SCREENING MAMMOGRAM FOR BREAST CANCER: Primary | ICD-10-CM

## 2023-02-23 ENCOUNTER — TRANSCRIBE ORDER (OUTPATIENT)
Dept: SCHEDULING | Age: 38
End: 2023-02-23

## 2023-02-23 DIAGNOSIS — Z12.31 ENCOUNTER FOR SCREENING MAMMOGRAM FOR MALIGNANT NEOPLASM OF BREAST: Primary | ICD-10-CM

## 2023-04-21 DIAGNOSIS — Z12.31 ENCOUNTER FOR SCREENING MAMMOGRAM FOR MALIGNANT NEOPLASM OF BREAST: Primary | ICD-10-CM

## 2023-04-22 DIAGNOSIS — Z12.31 ENCOUNTER FOR SCREENING MAMMOGRAM FOR MALIGNANT NEOPLASM OF BREAST: Primary | ICD-10-CM

## 2023-04-22 DIAGNOSIS — Z12.31 ENCOUNTER FOR SCREENING MAMMOGRAM FOR BREAST CANCER: Primary | ICD-10-CM

## 2023-10-13 ENCOUNTER — HOSPITAL ENCOUNTER (EMERGENCY)
Facility: HOSPITAL | Age: 38
Discharge: HOME | End: 2023-10-14
Attending: EMERGENCY MEDICINE
Payer: MEDICAID

## 2023-10-13 DIAGNOSIS — U07.1 COVID-19 VIRUS INFECTION: ICD-10-CM

## 2023-10-13 DIAGNOSIS — B34.9 ACUTE VIRAL SYNDROME: Primary | ICD-10-CM

## 2023-10-13 LAB
ALBUMIN SERPL-MCNC: 3.5 G/DL (ref 3.5–5)
ALBUMIN/GLOB SERPL: 0.9 (ref 1.1–2.2)
ALP SERPL-CCNC: 67 U/L (ref 45–117)
ALT SERPL-CCNC: 25 U/L (ref 12–78)
ANION GAP SERPL CALC-SCNC: 11 MMOL/L (ref 5–15)
APPEARANCE UR: CLEAR
AST SERPL-CCNC: 17 U/L (ref 15–37)
BACTERIA URNS QL MICRO: ABNORMAL /HPF
BASOPHILS # BLD: 0 K/UL (ref 0–0.1)
BASOPHILS NFR BLD: 0 % (ref 0–1)
BILIRUB SERPL-MCNC: 0.2 MG/DL (ref 0.2–1)
BILIRUB UR QL: NEGATIVE
BUN SERPL-MCNC: 17 MG/DL (ref 6–20)
BUN/CREAT SERPL: 22 (ref 12–20)
CALCIUM SERPL-MCNC: 8.9 MG/DL (ref 8.5–10.1)
CHLORIDE SERPL-SCNC: 104 MMOL/L (ref 97–108)
CO2 SERPL-SCNC: 24 MMOL/L (ref 21–32)
COLOR UR: ABNORMAL
CREAT SERPL-MCNC: 0.78 MG/DL (ref 0.55–1.02)
DIFFERENTIAL METHOD BLD: ABNORMAL
EOSINOPHIL # BLD: 0.2 K/UL (ref 0–0.4)
EOSINOPHIL NFR BLD: 2 % (ref 0–7)
EPITH CASTS URNS QL MICRO: ABNORMAL /LPF
ERYTHROCYTE [DISTWIDTH] IN BLOOD BY AUTOMATED COUNT: 16.1 % (ref 11.5–14.5)
FLUAV AG NPH QL IA: NEGATIVE
FLUBV AG NOSE QL IA: NEGATIVE
GLOBULIN SER CALC-MCNC: 4.1 G/DL (ref 2–4)
GLUCOSE SERPL-MCNC: 98 MG/DL (ref 65–100)
GLUCOSE UR STRIP.AUTO-MCNC: NEGATIVE MG/DL
HCG UR QL: NEGATIVE
HCT VFR BLD AUTO: 39.4 % (ref 35–47)
HGB BLD-MCNC: 12.2 G/DL (ref 11.5–16)
HGB UR QL STRIP: ABNORMAL
IMM GRANULOCYTES # BLD AUTO: 0 K/UL (ref 0–0.04)
IMM GRANULOCYTES NFR BLD AUTO: 0 % (ref 0–0.5)
KETONES UR QL STRIP.AUTO: NEGATIVE MG/DL
LEUKOCYTE ESTERASE UR QL STRIP.AUTO: ABNORMAL
LIPASE SERPL-CCNC: 54 U/L (ref 13–75)
LYMPHOCYTES # BLD: 2.1 K/UL (ref 0.8–3.5)
LYMPHOCYTES NFR BLD: 24 % (ref 12–49)
MCH RBC QN AUTO: 23.8 PG (ref 26–34)
MCHC RBC AUTO-ENTMCNC: 31 G/DL (ref 30–36.5)
MCV RBC AUTO: 76.8 FL (ref 80–99)
MONOCYTES # BLD: 1 K/UL (ref 0–1)
MONOCYTES NFR BLD: 11 % (ref 5–13)
NEUTS SEG # BLD: 5.8 K/UL (ref 1.8–8)
NEUTS SEG NFR BLD: 63 % (ref 32–75)
NITRITE UR QL STRIP.AUTO: NEGATIVE
NRBC # BLD: 0 K/UL (ref 0–0.01)
NRBC BLD-RTO: 0 PER 100 WBC
PH UR STRIP: 5.5 (ref 5–8)
PLATELET # BLD AUTO: 277 K/UL (ref 150–400)
PMV BLD AUTO: 10.6 FL (ref 8.9–12.9)
POTASSIUM SERPL-SCNC: 3.6 MMOL/L (ref 3.5–5.1)
PROT SERPL-MCNC: 7.6 G/DL (ref 6.4–8.2)
PROT UR STRIP-MCNC: NEGATIVE MG/DL
RBC # BLD AUTO: 5.13 M/UL (ref 3.8–5.2)
RBC #/AREA URNS HPF: ABNORMAL /HPF (ref 0–5)
SARS-COV-2 RDRP RESP QL NAA+PROBE: DETECTED
SODIUM SERPL-SCNC: 139 MMOL/L (ref 136–145)
SOURCE: ABNORMAL
SP GR UR REFRACTOMETRY: 1.02 (ref 1–1.03)
URINE CULTURE IF INDICATED: ABNORMAL
UROBILINOGEN UR QL STRIP.AUTO: 0.2 EU/DL (ref 0.2–1)
WBC # BLD AUTO: 9.1 K/UL (ref 3.6–11)
WBC URNS QL MICRO: ABNORMAL /HPF (ref 0–4)

## 2023-10-13 PROCEDURE — 83690 ASSAY OF LIPASE: CPT

## 2023-10-13 PROCEDURE — 6360000002 HC RX W HCPCS: Performed by: EMERGENCY MEDICINE

## 2023-10-13 PROCEDURE — 96374 THER/PROPH/DIAG INJ IV PUSH: CPT

## 2023-10-13 PROCEDURE — 87635 SARS-COV-2 COVID-19 AMP PRB: CPT

## 2023-10-13 PROCEDURE — 80053 COMPREHEN METABOLIC PANEL: CPT

## 2023-10-13 PROCEDURE — 2580000003 HC RX 258: Performed by: EMERGENCY MEDICINE

## 2023-10-13 PROCEDURE — 81001 URINALYSIS AUTO W/SCOPE: CPT

## 2023-10-13 PROCEDURE — 96375 TX/PRO/DX INJ NEW DRUG ADDON: CPT

## 2023-10-13 PROCEDURE — 99284 EMERGENCY DEPT VISIT MOD MDM: CPT

## 2023-10-13 PROCEDURE — 81025 URINE PREGNANCY TEST: CPT

## 2023-10-13 PROCEDURE — 96361 HYDRATE IV INFUSION ADD-ON: CPT

## 2023-10-13 PROCEDURE — 85025 COMPLETE CBC W/AUTO DIFF WBC: CPT

## 2023-10-13 PROCEDURE — 87804 INFLUENZA ASSAY W/OPTIC: CPT

## 2023-10-13 RX ORDER — KETOROLAC TROMETHAMINE 30 MG/ML
30 INJECTION, SOLUTION INTRAMUSCULAR; INTRAVENOUS
Status: COMPLETED | OUTPATIENT
Start: 2023-10-13 | End: 2023-10-13

## 2023-10-13 RX ORDER — DIPHENHYDRAMINE HYDROCHLORIDE 50 MG/ML
50 INJECTION INTRAMUSCULAR; INTRAVENOUS
Status: COMPLETED | OUTPATIENT
Start: 2023-10-13 | End: 2023-10-13

## 2023-10-13 RX ORDER — 0.9 % SODIUM CHLORIDE 0.9 %
1000 INTRAVENOUS SOLUTION INTRAVENOUS ONCE
Status: COMPLETED | OUTPATIENT
Start: 2023-10-13 | End: 2023-10-14

## 2023-10-13 RX ADMIN — DIPHENHYDRAMINE HYDROCHLORIDE 50 MG: 50 INJECTION INTRAMUSCULAR; INTRAVENOUS at 23:06

## 2023-10-13 RX ADMIN — KETOROLAC TROMETHAMINE 30 MG: 30 INJECTION, SOLUTION INTRAMUSCULAR at 23:08

## 2023-10-13 RX ADMIN — SODIUM CHLORIDE 1000 ML: 9 INJECTION, SOLUTION INTRAVENOUS at 23:06

## 2023-10-13 ASSESSMENT — PAIN DESCRIPTION - PAIN TYPE: TYPE: ACUTE PAIN

## 2023-10-13 ASSESSMENT — PAIN - FUNCTIONAL ASSESSMENT: PAIN_FUNCTIONAL_ASSESSMENT: 0-10

## 2023-10-13 ASSESSMENT — PAIN SCALES - GENERAL
PAINLEVEL_OUTOF10: 8
PAINLEVEL_OUTOF10: 7

## 2023-10-13 ASSESSMENT — PAIN DESCRIPTION - LOCATION
LOCATION: ABDOMEN;HEAD
LOCATION: HEAD

## 2023-10-13 ASSESSMENT — PAIN DESCRIPTION - DESCRIPTORS: DESCRIPTORS: ACHING

## 2023-10-14 VITALS
HEIGHT: 65 IN | HEART RATE: 80 BPM | RESPIRATION RATE: 18 BRPM | SYSTOLIC BLOOD PRESSURE: 123 MMHG | BODY MASS INDEX: 38.82 KG/M2 | TEMPERATURE: 98.6 F | WEIGHT: 233.03 LBS | DIASTOLIC BLOOD PRESSURE: 88 MMHG | OXYGEN SATURATION: 97 %

## 2023-10-14 RX ORDER — PROMETHAZINE HYDROCHLORIDE 25 MG/1
25 TABLET ORAL 4 TIMES DAILY PRN
Qty: 20 TABLET | Refills: 0 | Status: SHIPPED | OUTPATIENT
Start: 2023-10-14 | End: 2023-10-21

## 2023-10-14 NOTE — ED TRIAGE NOTES
Pt reports cough and cold symptoms x 1.5 weeks. Yellow sputum, headache and abdominal pain since yesterday. Denies nausea, vomiting, fever, or diarrhea.

## 2023-10-14 NOTE — ED NOTES
Patient left ED in no acute distress, alert and oriented x4. Patient was encourage to come back if symptoms get worse. Patient was provided with discharge instructions and prescriptions. All questions were answered. Patient left ambulatory.         Ed Centeno RN  10/14/23 1171

## 2023-10-18 NOTE — ED NOTES
Discharge instructions and prescriptions given to pt by MD. LOPEZ&Henry4 with steady gait. Birth Control Pills Counseling: Birth Control Pill Counseling: I discussed with the patient the potential side effects of OCPs including but not limited to increased risk of stroke, heart attack, thrombophlebitis, deep venous thrombosis, hepatic adenomas, breast changes, GI upset, headaches, and depression.  The patient verbalized understanding of the proper use and possible adverse effects of OCPs. All of the patient's questions and concerns were addressed.

## 2024-04-15 ENCOUNTER — TRANSCRIBE ORDERS (OUTPATIENT)
Facility: HOSPITAL | Age: 39
End: 2024-04-15

## 2024-04-15 DIAGNOSIS — Z12.31 SCREENING MAMMOGRAM FOR HIGH-RISK PATIENT: Primary | ICD-10-CM

## 2024-05-21 ENCOUNTER — HOSPITAL ENCOUNTER (OUTPATIENT)
Facility: HOSPITAL | Age: 39
Discharge: HOME | End: 2024-05-24
Attending: SPECIALIST
Payer: MEDICAID

## 2024-05-21 DIAGNOSIS — N64.4 MASTODYNIA: ICD-10-CM

## 2024-05-21 PROCEDURE — G0279 TOMOSYNTHESIS, MAMMO: HCPCS

## 2024-08-21 ENCOUNTER — HOSPITAL ENCOUNTER (EMERGENCY)
Facility: HOSPITAL | Age: 39
Discharge: HOME | End: 2024-08-21
Payer: MEDICAID

## 2024-08-21 ENCOUNTER — HOSPITAL ENCOUNTER (EMERGENCY)
Facility: HOSPITAL | Age: 39
Discharge: HOME | End: 2024-08-21
Attending: EMERGENCY MEDICINE
Payer: MEDICAID

## 2024-08-21 VITALS
DIASTOLIC BLOOD PRESSURE: 87 MMHG | RESPIRATION RATE: 18 BRPM | OXYGEN SATURATION: 98 % | SYSTOLIC BLOOD PRESSURE: 127 MMHG | HEART RATE: 84 BPM | TEMPERATURE: 97.5 F | HEIGHT: 65 IN | WEIGHT: 238.54 LBS | BODY MASS INDEX: 39.74 KG/M2

## 2024-08-21 VITALS
WEIGHT: 242.06 LBS | TEMPERATURE: 98.1 F | OXYGEN SATURATION: 100 % | RESPIRATION RATE: 20 BRPM | BODY MASS INDEX: 40.33 KG/M2 | HEIGHT: 65 IN | DIASTOLIC BLOOD PRESSURE: 89 MMHG | SYSTOLIC BLOOD PRESSURE: 138 MMHG | HEART RATE: 79 BPM

## 2024-08-21 DIAGNOSIS — B34.9 VIRAL ILLNESS: Primary | ICD-10-CM

## 2024-08-21 DIAGNOSIS — R68.89 FLU-LIKE SYMPTOMS: ICD-10-CM

## 2024-08-21 DIAGNOSIS — Z20.822 EXPOSURE TO COVID-19 VIRUS: Primary | ICD-10-CM

## 2024-08-21 DIAGNOSIS — R11.2 NAUSEA AND VOMITING, UNSPECIFIED VOMITING TYPE: ICD-10-CM

## 2024-08-21 PROCEDURE — 99283 EMERGENCY DEPT VISIT LOW MDM: CPT

## 2024-08-21 PROCEDURE — 6370000000 HC RX 637 (ALT 250 FOR IP): Performed by: EMERGENCY MEDICINE

## 2024-08-21 PROCEDURE — 99282 EMERGENCY DEPT VISIT SF MDM: CPT

## 2024-08-21 RX ORDER — PROMETHAZINE HYDROCHLORIDE 25 MG/1
25 SUPPOSITORY RECTAL EVERY 6 HOURS PRN
Qty: 20 SUPPOSITORY | Refills: 0 | Status: SHIPPED | OUTPATIENT
Start: 2024-08-21 | End: 2024-08-28

## 2024-08-21 RX ORDER — KETOROLAC TROMETHAMINE 30 MG/ML
15 INJECTION, SOLUTION INTRAMUSCULAR; INTRAVENOUS
Status: DISCONTINUED | OUTPATIENT
Start: 2024-08-21 | End: 2024-08-21

## 2024-08-21 RX ORDER — IBUPROFEN 600 MG/1
600 TABLET ORAL 4 TIMES DAILY PRN
Qty: 28 TABLET | Refills: 0 | Status: SHIPPED | OUTPATIENT
Start: 2024-08-21 | End: 2024-08-28

## 2024-08-21 RX ORDER — IBUPROFEN 600 MG/1
600 TABLET ORAL
Status: COMPLETED | OUTPATIENT
Start: 2024-08-21 | End: 2024-08-21

## 2024-08-21 RX ORDER — PROMETHAZINE HYDROCHLORIDE 25 MG/1
25 TABLET ORAL 4 TIMES DAILY PRN
Qty: 20 TABLET | Refills: 0 | Status: SHIPPED | OUTPATIENT
Start: 2024-08-21 | End: 2024-08-28

## 2024-08-21 RX ADMIN — IBUPROFEN 600 MG: 600 TABLET, FILM COATED ORAL at 15:00

## 2024-08-21 ASSESSMENT — ENCOUNTER SYMPTOMS
COUGH: 0
VOMITING: 0
SORE THROAT: 0
VOMITING: 1
NAUSEA: 1

## 2024-08-21 ASSESSMENT — PAIN SCALES - GENERAL: PAINLEVEL_OUTOF10: 0

## 2024-08-21 ASSESSMENT — VISUAL ACUITY: OU: 1

## 2024-08-21 ASSESSMENT — PAIN - FUNCTIONAL ASSESSMENT: PAIN_FUNCTIONAL_ASSESSMENT: 0-10

## 2024-08-21 NOTE — ED PROVIDER NOTES
[facility]  EMERGENCY DEPARTMENT ENCOUNTER      Pt Name: Catherine Alfaro  MRN: [id number]  Birthdate 1985  Date of evaluation: 8/21/2024  Provider: Yaw Otoole MD    CHIEF COMPLAINT       Chief Complaint   Patient presents with    Nausea    Fatigue     Presented to ED with nausea and generalized body aches - children with recent exposure to fifths disease and COVID. Denies fever, reports cough and weakness with decreased oral intake. Had ice cream on the way to disease.          HISTORY OF PRESENT ILLNESS   (Location/Symptom, Timing/Onset, Context/Setting, Quality, Duration, Modifying Factors, Severity)  Note limiting factors.   38-year-old female presents from [facility] with concerns for viral infection vomiting.  States that her son has been sick and was diagnosed with fifths disease.  She started developing the same symptoms yesterday.  She reports chills, sore throat, congestion.  This morning she woke up and vomited several times.  No diarrhea.  She is not take any medications for the symptoms.  Review of EMR shows a history significant for GERD, hypertension, anxiety depression, lupus.    The history is provided by the patient.         Review of External Medical Records:     Nursing Notes were reviewed.    REVIEW OF SYSTEMS    (2-9 systems for level 4, 10 or more for level 5)     Review of Systems   Constitutional:  Negative for fatigue.   HENT:  Negative for sore throat.    Eyes:  Negative for visual disturbance.   Respiratory:  Negative for cough.    Cardiovascular:  Negative for palpitations.   Gastrointestinal:  Negative for vomiting.   Genitourinary:  Negative for difficulty urinating.   Musculoskeletal:  Negative for myalgias.   Skin:  Negative for rash.   Neurological:  Negative for weakness.       Except as noted above the remainder of the review of systems was reviewed and negative.       PAST MEDICAL HISTORY     Past Medical History:   Diagnosis Date    Abnormal

## 2024-08-21 NOTE — ED PROVIDER NOTES
[facility] EMERGENCY DEPT  EMERGENCY DEPARTMENT ENCOUNTER       Pt Name: Catherine Alfaro  MRN: [id number]  Birthdate 1985  Date of evaluation: 8/21/2024  Provider: SARAH Lucia   PCP: Lulu Iyer MD  Note Started: 1:14 PM EDT 8/21/24     CHIEF COMPLAINT       Chief Complaint   Patient presents with    Concern For COVID-19     Pt arrives ambulatory into [facility] CC of exposure to COVID19 & fifth disease - per pt \"my doctor said his son had it so I was exposed\"; pt reports weakness, nausea, x30 occurrences of vomiting today        HISTORY OF PRESENT ILLNESS: 1 or more elements      History From: Patient  HPI Limitations: None     Catherine Alfaro is a 38 y.o. female who presents ambulatory with her mom and son.  She complains of nausea vomiting.  She is concerned she was exposed to COVID and fifth disease.     Nursing Notes were all reviewed and agreed with or any disagreements were addressed in the HPI.     REVIEW OF SYSTEMS      Review of Systems   Gastrointestinal:  Positive for nausea and vomiting.        Positives and Pertinent negatives as per HPI.    PAST HISTORY     Past Medical History:  Past Medical History:   Diagnosis Date    Abnormal Papanicolaou smear of cervix     HPV    Anemia     Bilateral ovarian cysts     Essential hypertension     preeclampsia with first pregnancy    GERD (gastroesophageal reflux disease)     Gestational hypertension     with first pregnancy     Lupus (HCC)     diagnosed at 13 years of age- systemic lupus-pt doesnt see anyone for it and she said it must be mild form    Migraine     Neurological disorder     migraines     Other ill-defined conditions(799.89)     lupus    Postpartum depression     prescribed effexor     Psychiatric disorder     anxiety/depression- was taking ativan prior to pregnancy       Past Surgical History:  Past Surgical History:   Procedure Laterality Date    COLONOSCOPY N/A 3/14/2017    COLONOSCOPY performed by Suraj Jerome MD at [facility] ENDOSCOPY

## 2024-08-21 NOTE — ED NOTES
PO ibuprofen administered. Discharge instructions provided, verbalized understanding or RX, rest, oral fluids and follow up with PMD.

## 2024-08-21 NOTE — ED TRIAGE NOTES
Presented to ED with nausea and generalized body aches - children with recent exposure to fifths disease and COVID. Denies fever, reports cough and weakness with decreased oral intake. Had ice cream on the way to disease. Woke up with a lot of flem and thinks that may have caused her nausea.

## 2025-01-24 ENCOUNTER — APPOINTMENT (OUTPATIENT)
Facility: HOSPITAL | Age: 40
End: 2025-01-24
Payer: MEDICAID

## 2025-01-24 ENCOUNTER — HOSPITAL ENCOUNTER (EMERGENCY)
Facility: HOSPITAL | Age: 40
Discharge: HOME | End: 2025-01-24
Attending: EMERGENCY MEDICINE
Payer: MEDICAID

## 2025-01-24 VITALS
SYSTOLIC BLOOD PRESSURE: 144 MMHG | RESPIRATION RATE: 16 BRPM | HEART RATE: 82 BPM | TEMPERATURE: 98 F | DIASTOLIC BLOOD PRESSURE: 95 MMHG | OXYGEN SATURATION: 100 %

## 2025-01-24 DIAGNOSIS — J10.1 INFLUENZA A: Primary | ICD-10-CM

## 2025-01-24 DIAGNOSIS — R11.2 NAUSEA AND VOMITING, UNSPECIFIED VOMITING TYPE: ICD-10-CM

## 2025-01-24 DIAGNOSIS — R51.9 RIGHT-SIDED HEADACHE: ICD-10-CM

## 2025-01-24 PROCEDURE — 99283 EMERGENCY DEPT VISIT LOW MDM: CPT

## 2025-01-24 RX ORDER — PROMETHAZINE HYDROCHLORIDE 25 MG/1
25 TABLET ORAL EVERY 6 HOURS PRN
Qty: 6 TABLET | Refills: 0 | Status: SHIPPED | OUTPATIENT
Start: 2025-01-24 | End: 2025-01-31

## 2025-01-24 RX ORDER — IBUPROFEN 600 MG/1
600 TABLET, FILM COATED ORAL EVERY 6 HOURS PRN
Qty: 28 TABLET | Refills: 0 | Status: SHIPPED | OUTPATIENT
Start: 2025-01-24 | End: 2025-01-31

## 2025-01-24 RX ORDER — ACETAMINOPHEN 500 MG
1000 TABLET ORAL EVERY 6 HOURS PRN
Qty: 40 TABLET | Refills: 0 | Status: SHIPPED | OUTPATIENT
Start: 2025-01-24

## 2025-01-24 ASSESSMENT — PAIN SCALES - GENERAL: PAINLEVEL_OUTOF10: 0

## 2025-01-25 NOTE — ED PROVIDER NOTES
[facility] DEPART[facility]  EMERGENCY DEPARTMENT ENCOUNTER      Pt Name: Catherine Alfaro  MRN: [id number]  Birthdate 1985  Date of evaluation: 1/24/2025  Provider: Jose Juan Roque MD    CHIEF COMPLAINT       Chief Complaint   Patient presents with    Influenza         HISTORY OF PRESENT ILLNESS    HPI      Review of External Medical Records:     Nursing Notes were reviewed.    REVIEW OF SYSTEMS       Review of Systems    Except as noted above the remainder of the review of systems was reviewed and negative.       PAST MEDICAL HISTORY     Past Medical History:   Diagnosis Date    Abnormal Papanicolaou smear of cervix     HPV    Anemia     Bilateral ovarian cysts     Essential hypertension     preeclampsia with first pregnancy    GERD (gastroesophageal reflux disease)     Gestational hypertension     with first pregnancy     Lupus (HCC)     diagnosed at 13 years of age- systemic lupus-pt doesnt see anyone for it and she said it must be mild form    Migraine     Neurological disorder     migraines     Other ill-defined conditions(799.89)     lupus    Postpartum depression     prescribed effexor     Psychiatric disorder     anxiety/depression- was taking ativan prior to pregnancy         SURGICAL HISTORY       Past Surgical History:   Procedure Laterality Date    COLONOSCOPY N/A 3/14/2017    COLONOSCOPY performed by Suraj Jerome MD at \[facility]\"" ENDOSCOPY    COLONOSCOPY,DIAGNOSTIC  3/14/2017         GI      LAPAROSCOPY    HEENT  4/4/16    sinus surgery    HERNIA REPAIR      BILATERAL INGUINAL HERNIA REPAIR- 6 weeks old    OTHER SURGICAL HISTORY      hemmorhoid, hernia-groin as an infant     OTHER SURGICAL HISTORY      sinus surgery          CURRENT MEDICATIONS       Previous Medications    ALBUTEROL SULFATE (PROAIR RESPICLICK) 108 (90 BASE) MCG/ACT AEROSOL POWDER INHALATION    Inhale 2 puffs into the lungs 4 times daily as needed    BENZOCAINE-RESORCINOL (ANTI-ITCH VAGINAL) 5-2 % CREA    Apply topically 2 times daily

## 2025-01-25 NOTE — ED TRIAGE NOTES
Positive flu tests among all family members, unable to test themselves at home. Pt has some n/v/d, taking tylenol and ibu for fever and HA management. Req xray for PNA r/o

## 2025-07-11 ENCOUNTER — TRANSCRIBE ORDERS (OUTPATIENT)
Facility: HOSPITAL | Age: 40
End: 2025-07-11

## 2025-07-11 DIAGNOSIS — Z12.31 ENCOUNTER FOR SCREENING MAMMOGRAM FOR MALIGNANT NEOPLASM OF BREAST: Primary | ICD-10-CM

## (undated) DEVICE — SOLUTION IRRIG 3000ML 0.9% SOD CHL FLX CONT 0797208] ICU MEDICAL INC]

## (undated) DEVICE — BASIN EMESIS 500CC ROSE 250/CS 60/PLT: Brand: MEDEGEN MEDICAL PRODUCTS, LLC

## (undated) DEVICE — NDL SPNE QNCKE 18GX3.5IN LF --

## (undated) DEVICE — TUBE ST FLD AQUILEX OUTFLO --

## (undated) DEVICE — SYR 3ML LL TIP 1/10ML GRAD --

## (undated) DEVICE — DRAPE,REIN 53X77,STERILE: Brand: MEDLINE

## (undated) DEVICE — PAD,SANITARY,11 IN,MAXI,N-STRL,IND WRAP: Brand: MEDLINE

## (undated) DEVICE — GARMENT,MEDLINE,DVT,INT,CALF,MED, GEN2: Brand: MEDLINE

## (undated) DEVICE — SOLUTION SCRB 2OZ 10% POVIDONE IOD ANTIMIC BTL

## (undated) DEVICE — SYR 10ML LUER LOK 1/5ML GRAD --

## (undated) DEVICE — Device: Brand: MEDEX

## (undated) DEVICE — KIT,1200CC CANISTER,3/16"X6' TUBING: Brand: MEDLINE INDUSTRIES, INC.

## (undated) DEVICE — NEEDLE HYPO 18GA L1.5IN PNK S STL HUB POLYPR SHLD REG BVL

## (undated) DEVICE — SET ADMIN 16ML TBNG L100IN 2 Y INJ SITE IV PIGGY BK DISP

## (undated) DEVICE — Device

## (undated) DEVICE — KENDALL RADIOLUCENT FOAM MONITORING ELECTRODE RECTANGULAR SHAPE: Brand: KENDALL

## (undated) DEVICE — INFECTION CONTROL KIT SYS

## (undated) DEVICE — GOWN,SIRUS,NONRNF,SETINSLV,XL,20/CS: Brand: MEDLINE

## (undated) DEVICE — CATH IV AUTOGRD BC PNK 20GA 25 -- INSYTE

## (undated) DEVICE — PACK,LITHOTOMY,PK I: Brand: MEDLINE

## (undated) DEVICE — TOWEL 4 PLY TISS 19X30 SUE WHT

## (undated) DEVICE — 1200 GUARD II KIT W/5MM TUBE W/O VAC TUBE: Brand: GUARDIAN

## (undated) DEVICE — STRAP,POSITIONING,KNEE/BODY,FOAM,4X60": Brand: MEDLINE

## (undated) DEVICE — HANDLE LT SNAP ON ULT DURABLE LENS FOR TRUMPF ALC DISPOSABLE

## (undated) DEVICE — JELLY,LUBE,STERILE,FLIP TOP,TUBE,2-OZ: Brand: MEDLINE

## (undated) DEVICE — TOWEL SURG W17XL27IN STD BLU COT NONFENESTRATED PREWASHED

## (undated) DEVICE — STERILE POLYISOPRENE POWDER-FREE SURGICAL GLOVES: Brand: PROTEXIS

## (undated) DEVICE — SOLIDIFIER MEDC 1200ML -- CONVERT TO 356117

## (undated) DEVICE — (D)SYR 10ML 1/5ML GRAD NSAF -- PKGING CHANGE USE ITEM 338027

## (undated) DEVICE — SET SEALS HYSTEROSCOPE DISP -- MYOSURE  EA=10

## (undated) DEVICE — CATHETER,URETHRAL,REDRUBBER,STRL,14FR: Brand: MEDLINE INDUSTRIES, INC.

## (undated) DEVICE — Z INACTIVE USE 2527070 DRAPE SURG W40XL44IN UNDERBUTTOCK SMS POLYPR W/ PCH BK DISP

## (undated) DEVICE — Z DISCONTINUED PER MEDLINE LINE GAS SAMPLING O2/CO2 LNG AD 13 FT NSL W/ TBNG FILTERLINE

## (undated) DEVICE — TUBE ST FLD CTRL AQUILEX INFLO --